# Patient Record
Sex: FEMALE | Race: WHITE | Employment: UNEMPLOYED | URBAN - NONMETROPOLITAN AREA
[De-identification: names, ages, dates, MRNs, and addresses within clinical notes are randomized per-mention and may not be internally consistent; named-entity substitution may affect disease eponyms.]

---

## 2017-02-24 DIAGNOSIS — I10 ESSENTIAL HYPERTENSION: ICD-10-CM

## 2017-02-24 DIAGNOSIS — E03.9 ACQUIRED HYPOTHYROIDISM: ICD-10-CM

## 2017-02-24 DIAGNOSIS — E78.2 MIXED HYPERLIPIDEMIA: ICD-10-CM

## 2017-02-24 LAB
ALT SERPL-CCNC: 18 U/L (ref 0–33)
ANION GAP SERPL CALCULATED.3IONS-SCNC: 10 MEQ/L (ref 7–13)
AST SERPL-CCNC: 23 U/L (ref 0–35)
BASOPHILS ABSOLUTE: 0.1 K/UL (ref 0–0.2)
BASOPHILS RELATIVE PERCENT: 1.3 %
BILIRUBIN URINE: NEGATIVE
BLOOD, URINE: NEGATIVE
BUN BLDV-MCNC: 20 MG/DL (ref 8–23)
CALCIUM SERPL-MCNC: 9.1 MG/DL (ref 8.6–10.2)
CHLORIDE BLD-SCNC: 102 MEQ/L (ref 98–107)
CHOLESTEROL, TOTAL: 204 MG/DL (ref 0–199)
CLARITY: ABNORMAL
CO2: 26 MEQ/L (ref 22–29)
COLOR: YELLOW
CREAT SERPL-MCNC: 0.73 MG/DL (ref 0.5–0.9)
EOSINOPHILS ABSOLUTE: 0.1 K/UL (ref 0–0.7)
EOSINOPHILS RELATIVE PERCENT: 2.6 %
GFR AFRICAN AMERICAN: >60
GFR NON-AFRICAN AMERICAN: >60
GLUCOSE BLD-MCNC: 94 MG/DL (ref 74–109)
GLUCOSE URINE: NEGATIVE MG/DL
HCT VFR BLD CALC: 48.8 % (ref 37–47)
HDLC SERPL-MCNC: 57 MG/DL (ref 40–59)
HEMOGLOBIN: 16.5 G/DL (ref 12–16)
KETONES, URINE: NEGATIVE MG/DL
LDL CHOLESTEROL CALCULATED: 110 MG/DL (ref 0–129)
LEUKOCYTE ESTERASE, URINE: NEGATIVE
LYMPHOCYTES ABSOLUTE: 1.7 K/UL (ref 1–4.8)
LYMPHOCYTES RELATIVE PERCENT: 36.2 %
MCH RBC QN AUTO: 30.4 PG (ref 27–31.3)
MCHC RBC AUTO-ENTMCNC: 33.7 % (ref 33–37)
MCV RBC AUTO: 90.1 FL (ref 82–100)
MONOCYTES ABSOLUTE: 0.6 K/UL (ref 0.2–0.8)
MONOCYTES RELATIVE PERCENT: 12.2 %
NEUTROPHILS ABSOLUTE: 2.2 K/UL (ref 1.4–6.5)
NEUTROPHILS RELATIVE PERCENT: 47.7 %
NITRITE, URINE: NEGATIVE
PDW BLD-RTO: 13.6 % (ref 11.5–14.5)
PH UA: 6.5 (ref 5–9)
PLATELET # BLD: 216 K/UL (ref 130–400)
POTASSIUM SERPL-SCNC: 4.4 MEQ/L (ref 3.5–5.1)
PROTEIN UA: NEGATIVE MG/DL
RBC # BLD: 5.42 M/UL (ref 4.2–5.4)
SODIUM BLD-SCNC: 138 MEQ/L (ref 132–144)
SPECIFIC GRAVITY UA: 1.02 (ref 1–1.03)
TRIGL SERPL-MCNC: 185 MG/DL (ref 0–200)
TSH SERPL DL<=0.05 MIU/L-ACNC: 2.77 UIU/ML (ref 0.27–4.2)
UROBILINOGEN, URINE: 0.2 E.U./DL
WBC # BLD: 4.7 K/UL (ref 4.8–10.8)

## 2017-03-23 ENCOUNTER — OFFICE VISIT (OUTPATIENT)
Dept: FAMILY MEDICINE CLINIC | Age: 77
End: 2017-03-23

## 2017-03-23 VITALS
BODY MASS INDEX: 35.26 KG/M2 | WEIGHT: 199 LBS | DIASTOLIC BLOOD PRESSURE: 72 MMHG | HEART RATE: 69 BPM | SYSTOLIC BLOOD PRESSURE: 132 MMHG | HEIGHT: 63 IN | OXYGEN SATURATION: 98 %

## 2017-03-23 DIAGNOSIS — I10 ESSENTIAL HYPERTENSION: Primary | ICD-10-CM

## 2017-03-23 DIAGNOSIS — E03.9 ACQUIRED HYPOTHYROIDISM: ICD-10-CM

## 2017-03-23 DIAGNOSIS — M17.0 PRIMARY OSTEOARTHRITIS OF BOTH KNEES: ICD-10-CM

## 2017-03-23 DIAGNOSIS — E78.2 MIXED HYPERLIPIDEMIA: ICD-10-CM

## 2017-03-23 DIAGNOSIS — M47.812 OSTEOARTHRITIS OF CERVICAL SPINE, UNSPECIFIED SPINAL OSTEOARTHRITIS COMPLICATION STATUS: ICD-10-CM

## 2017-03-23 PROCEDURE — 99214 OFFICE O/P EST MOD 30 MIN: CPT | Performed by: FAMILY MEDICINE

## 2017-03-23 PROCEDURE — G8417 CALC BMI ABV UP PARAM F/U: HCPCS | Performed by: FAMILY MEDICINE

## 2017-03-23 PROCEDURE — G8427 DOCREV CUR MEDS BY ELIG CLIN: HCPCS | Performed by: FAMILY MEDICINE

## 2017-03-23 PROCEDURE — G8399 PT W/DXA RESULTS DOCUMENT: HCPCS | Performed by: FAMILY MEDICINE

## 2017-03-23 PROCEDURE — 4040F PNEUMOC VAC/ADMIN/RCVD: CPT | Performed by: FAMILY MEDICINE

## 2017-03-23 PROCEDURE — 1036F TOBACCO NON-USER: CPT | Performed by: FAMILY MEDICINE

## 2017-03-23 PROCEDURE — 1123F ACP DISCUSS/DSCN MKR DOCD: CPT | Performed by: FAMILY MEDICINE

## 2017-03-23 PROCEDURE — G8484 FLU IMMUNIZE NO ADMIN: HCPCS | Performed by: FAMILY MEDICINE

## 2017-03-23 PROCEDURE — 1090F PRES/ABSN URINE INCON ASSESS: CPT | Performed by: FAMILY MEDICINE

## 2017-03-23 ASSESSMENT — ENCOUNTER SYMPTOMS
BLURRED VISION: 0
SHORTNESS OF BREATH: 0

## 2017-10-13 ENCOUNTER — APPOINTMENT (OUTPATIENT)
Dept: GENERAL RADIOLOGY | Age: 77
End: 2017-10-13
Payer: MEDICARE

## 2017-10-13 ENCOUNTER — HOSPITAL ENCOUNTER (OUTPATIENT)
Age: 77
Setting detail: OBSERVATION
Discharge: HOME | End: 2017-10-14
Attending: EMERGENCY MEDICINE | Admitting: INTERNAL MEDICINE
Payer: MEDICARE

## 2017-10-13 ENCOUNTER — TELEPHONE (OUTPATIENT)
Dept: ADMINISTRATIVE | Age: 77
End: 2017-10-13

## 2017-10-13 DIAGNOSIS — R07.89 ATYPICAL CHEST PAIN: Primary | ICD-10-CM

## 2017-10-13 DIAGNOSIS — M47.812 OSTEOARTHRITIS OF CERVICAL SPINE, UNSPECIFIED SPINAL OSTEOARTHRITIS COMPLICATION STATUS: ICD-10-CM

## 2017-10-13 DIAGNOSIS — E86.0 DEHYDRATION: ICD-10-CM

## 2017-10-13 LAB
ALBUMIN SERPL-MCNC: 4 G/DL (ref 3.9–4.9)
ALP BLD-CCNC: 77 U/L (ref 40–130)
ALT SERPL-CCNC: 12 U/L (ref 0–33)
ANION GAP SERPL CALCULATED.3IONS-SCNC: 13 MEQ/L (ref 7–13)
AST SERPL-CCNC: 20 U/L (ref 0–35)
BACTERIA: NORMAL /HPF
BASOPHILS ABSOLUTE: 0.1 K/UL (ref 0–0.2)
BASOPHILS RELATIVE PERCENT: 1 %
BILIRUB SERPL-MCNC: 0.6 MG/DL (ref 0–1.2)
BILIRUBIN URINE: NEGATIVE
BLOOD, URINE: NEGATIVE
BUN BLDV-MCNC: 24 MG/DL (ref 8–23)
CALCIUM SERPL-MCNC: 9.5 MG/DL (ref 8.6–10.2)
CHLORIDE BLD-SCNC: 104 MEQ/L (ref 98–107)
CLARITY: CLEAR
CO2: 24 MEQ/L (ref 22–29)
COLOR: YELLOW
CREAT SERPL-MCNC: 0.47 MG/DL (ref 0.5–0.9)
EOSINOPHILS ABSOLUTE: 0.1 K/UL (ref 0–0.7)
EOSINOPHILS RELATIVE PERCENT: 1.8 %
GFR AFRICAN AMERICAN: >60
GFR NON-AFRICAN AMERICAN: >60
GLOBULIN: 3 G/DL (ref 2.3–3.5)
GLUCOSE BLD-MCNC: 110 MG/DL (ref 74–109)
GLUCOSE URINE: NEGATIVE MG/DL
HCT VFR BLD CALC: 48.6 % (ref 37–47)
HEMOGLOBIN: 16.2 G/DL (ref 12–16)
KETONES, URINE: NEGATIVE MG/DL
LEUKOCYTE ESTERASE, URINE: ABNORMAL
LYMPHOCYTES ABSOLUTE: 1.4 K/UL (ref 1–4.8)
LYMPHOCYTES RELATIVE PERCENT: 25.1 %
MAGNESIUM: 2 MG/DL (ref 1.7–2.3)
MCH RBC QN AUTO: 29.5 PG (ref 27–31.3)
MCHC RBC AUTO-ENTMCNC: 33.3 % (ref 33–37)
MCV RBC AUTO: 88.5 FL (ref 82–100)
MONOCYTES ABSOLUTE: 0.7 K/UL (ref 0.2–0.8)
MONOCYTES RELATIVE PERCENT: 12 %
NEUTROPHILS ABSOLUTE: 3.5 K/UL (ref 1.4–6.5)
NEUTROPHILS RELATIVE PERCENT: 60.1 %
NITRITE, URINE: NEGATIVE
PDW BLD-RTO: 13.6 % (ref 11.5–14.5)
PH UA: 5 (ref 5–9)
PLATELET # BLD: 235 K/UL (ref 130–400)
POTASSIUM SERPL-SCNC: 4.1 MEQ/L (ref 3.5–5.1)
PRO-BNP: 153 PG/ML
PROTEIN UA: NEGATIVE MG/DL
RBC # BLD: 5.49 M/UL (ref 4.2–5.4)
RBC UA: NORMAL /HPF (ref 0–2)
SODIUM BLD-SCNC: 141 MEQ/L (ref 132–144)
SPECIFIC GRAVITY UA: 1.03 (ref 1–1.03)
TOTAL CK: 87 U/L (ref 0–170)
TOTAL PROTEIN: 7 G/DL (ref 6.4–8.1)
TROPONIN: <0.01 NG/ML (ref 0–0.01)
URINE REFLEX TO CULTURE: YES
UROBILINOGEN, URINE: 0.2 E.U./DL
WBC # BLD: 5.8 K/UL (ref 4.8–10.8)
WBC UA: NORMAL /HPF (ref 0–5)

## 2017-10-13 PROCEDURE — 96361 HYDRATE IV INFUSION ADD-ON: CPT

## 2017-10-13 PROCEDURE — 84484 ASSAY OF TROPONIN QUANT: CPT

## 2017-10-13 PROCEDURE — 96360 HYDRATION IV INFUSION INIT: CPT

## 2017-10-13 PROCEDURE — 71010 XR CHEST PORTABLE: CPT

## 2017-10-13 PROCEDURE — 2580000003 HC RX 258: Performed by: INTERNAL MEDICINE

## 2017-10-13 PROCEDURE — 36415 COLL VENOUS BLD VENIPUNCTURE: CPT

## 2017-10-13 PROCEDURE — 2580000003 HC RX 258: Performed by: PHYSICIAN ASSISTANT

## 2017-10-13 PROCEDURE — G0378 HOSPITAL OBSERVATION PER HR: HCPCS

## 2017-10-13 PROCEDURE — 80053 COMPREHEN METABOLIC PANEL: CPT

## 2017-10-13 PROCEDURE — 81001 URINALYSIS AUTO W/SCOPE: CPT

## 2017-10-13 PROCEDURE — 87086 URINE CULTURE/COLONY COUNT: CPT

## 2017-10-13 PROCEDURE — 83880 ASSAY OF NATRIURETIC PEPTIDE: CPT

## 2017-10-13 PROCEDURE — 93005 ELECTROCARDIOGRAM TRACING: CPT

## 2017-10-13 PROCEDURE — 85025 COMPLETE CBC W/AUTO DIFF WBC: CPT

## 2017-10-13 PROCEDURE — 99285 EMERGENCY DEPT VISIT HI MDM: CPT

## 2017-10-13 PROCEDURE — 82550 ASSAY OF CK (CPK): CPT

## 2017-10-13 PROCEDURE — 6370000000 HC RX 637 (ALT 250 FOR IP): Performed by: PHYSICIAN ASSISTANT

## 2017-10-13 PROCEDURE — 83735 ASSAY OF MAGNESIUM: CPT

## 2017-10-13 PROCEDURE — 96372 THER/PROPH/DIAG INJ SC/IM: CPT

## 2017-10-13 PROCEDURE — 6360000002 HC RX W HCPCS

## 2017-10-13 PROCEDURE — 6370000000 HC RX 637 (ALT 250 FOR IP): Performed by: INTERNAL MEDICINE

## 2017-10-13 RX ORDER — ATORVASTATIN CALCIUM 20 MG/1
20 TABLET, FILM COATED ORAL NIGHTLY
Status: DISCONTINUED | OUTPATIENT
Start: 2017-10-13 | End: 2017-10-14 | Stop reason: HOSPADM

## 2017-10-13 RX ORDER — MORPHINE SULFATE 4 MG/ML
4 INJECTION, SOLUTION INTRAMUSCULAR; INTRAVENOUS
Status: DISCONTINUED | OUTPATIENT
Start: 2017-10-13 | End: 2017-10-14 | Stop reason: HOSPADM

## 2017-10-13 RX ORDER — ACETAMINOPHEN 325 MG/1
650 TABLET ORAL EVERY 4 HOURS PRN
Status: DISCONTINUED | OUTPATIENT
Start: 2017-10-13 | End: 2017-10-14 | Stop reason: HOSPADM

## 2017-10-13 RX ORDER — MORPHINE SULFATE 2 MG/ML
2 INJECTION, SOLUTION INTRAMUSCULAR; INTRAVENOUS
Status: DISCONTINUED | OUTPATIENT
Start: 2017-10-13 | End: 2017-10-14 | Stop reason: HOSPADM

## 2017-10-13 RX ORDER — NITROGLYCERIN 0.4 MG/1
0.4 TABLET SUBLINGUAL EVERY 5 MIN PRN
Status: DISCONTINUED | OUTPATIENT
Start: 2017-10-13 | End: 2017-10-14 | Stop reason: HOSPADM

## 2017-10-13 RX ORDER — PHENOL 1.4 %
1 AEROSOL, SPRAY (ML) MUCOUS MEMBRANE DAILY
COMMUNITY

## 2017-10-13 RX ORDER — ONDANSETRON 2 MG/ML
4 INJECTION INTRAMUSCULAR; INTRAVENOUS EVERY 6 HOURS PRN
Status: DISCONTINUED | OUTPATIENT
Start: 2017-10-13 | End: 2017-10-14 | Stop reason: HOSPADM

## 2017-10-13 RX ORDER — 0.9 % SODIUM CHLORIDE 0.9 %
500 INTRAVENOUS SOLUTION INTRAVENOUS ONCE
Status: COMPLETED | OUTPATIENT
Start: 2017-10-13 | End: 2017-10-13

## 2017-10-13 RX ORDER — SODIUM CHLORIDE 0.9 % (FLUSH) 0.9 %
10 SYRINGE (ML) INJECTION PRN
Status: DISCONTINUED | OUTPATIENT
Start: 2017-10-13 | End: 2017-10-14 | Stop reason: HOSPADM

## 2017-10-13 RX ORDER — SODIUM CHLORIDE 0.9 % (FLUSH) 0.9 %
10 SYRINGE (ML) INJECTION EVERY 12 HOURS SCHEDULED
Status: DISCONTINUED | OUTPATIENT
Start: 2017-10-13 | End: 2017-10-14 | Stop reason: HOSPADM

## 2017-10-13 RX ADMIN — SODIUM CHLORIDE 500 ML: 9 INJECTION, SOLUTION INTRAVENOUS at 16:15

## 2017-10-13 RX ADMIN — NITROGLYCERIN 0.5 INCH: 20 OINTMENT TOPICAL at 17:08

## 2017-10-13 RX ADMIN — Medication 10 ML: at 22:03

## 2017-10-13 RX ADMIN — ATORVASTATIN CALCIUM 20 MG: 20 TABLET, FILM COATED ORAL at 22:01

## 2017-10-13 RX ADMIN — ENOXAPARIN SODIUM 40 MG: 40 INJECTION SUBCUTANEOUS at 22:01

## 2017-10-13 ASSESSMENT — ENCOUNTER SYMPTOMS
VOMITING: 0
ALLERGIC/IMMUNOLOGIC NEGATIVE: 1
APNEA: 0
COLOR CHANGE: 0
EYE PAIN: 0
TROUBLE SWALLOWING: 0
CHEST TIGHTNESS: 1
WHEEZING: 0
COUGH: 0
CHOKING: 0
DIARRHEA: 0
ABDOMINAL PAIN: 0
SHORTNESS OF BREATH: 1

## 2017-10-13 NOTE — ED PROVIDER NOTES
[address] [facility] ED  eMERGENCY dEPARTMENT eNCOUnter      Pt Name: Leeann Humphrey  MRN: [id number]  [place] 1940  Date of evaluation: 10/13/2017  Provider: Chastity Solares PA-C    CHIEF COMPLAINT       Chief Complaint   Patient presents with    Chest Pain     SOB x 1 week, tightness across shoulders and in chest.         HISTORY OF PRESENT ILLNESS   (Location/Symptom, Timing/Onset, Context/Setting, Quality, Duration, Modifying Factors, Severity)  Note limiting factors. Leeann Humphrey is a 68 y.o. female who presents to the emergency department with a 1-2 week history of intermittent episodes of shortness of breath and chest tightness. Patient denies any chest pain, stating it is just a \"weird feeling\" in the diffuse upper chest region with radiation into the anterior neck. Patient denies any recent cough or chest congestion. No headaches or dizziness, but SOB does make her feel lightheaded at times. No abdominal pain, nausea, vomiting or diarrhea. No fevers. No leg swelling or edema. Shortness of breath is intermittent but does seem to be more prominent with exertion and better at rest.     HPI    Nursing Notes were reviewed. REVIEW OF SYSTEMS    (2-9 systems for level 4, 10 or more for level 5)     Review of Systems   Constitutional: Negative for diaphoresis and fever. HENT: Negative for hearing loss and trouble swallowing. Eyes: Negative for pain. Respiratory: Positive for chest tightness and shortness of breath. Negative for apnea, cough, choking and wheezing. Cardiovascular: Negative for chest pain. Gastrointestinal: Negative for abdominal pain, diarrhea and vomiting. Endocrine: Negative. Genitourinary: Negative for hematuria. Musculoskeletal: Negative for neck pain and neck stiffness. Skin: Negative for color change. Allergic/Immunologic: Negative. Neurological: Positive for light-headedness. Negative for dizziness and numbness. Hematological: Negative. Psychiatric/Behavioral: Negative. All other systems reviewed and are negative. Except as noted above the remainder of the review of systems was reviewed and negative. PAST MEDICAL HISTORY     Past Medical History:   Diagnosis Date    Bladder prolapse, female, acquired     Degenerative arthritis of cervical spine 6/29/2012    Diverticulosis     History of bone density study 09/2014    Hyperlipidemia 6/29/2012    Hypertension 6/29/2012    Hypothyroidism 6/29/2012    Internal hemorrhoid     Osteoarthritis of knees, bilateral     Osteopenia 09/2014         SURGICAL HISTORY       Past Surgical History:   Procedure Laterality Date    ARTHROSCOPY / ARTHROTOMY KNEE Right 10/14/2016    RIGHT KNEE ARTHROSCOPY / LOOSE BODY / PAT ON ADMIT performed by Yakov Bermudez MD at [facility]  11/2015    CARPAL TUNNEL RELEASE Right     CATARACT REMOVAL WITH IMPLANT Bilateral     COLONOSCOPY  12/15/05    CYSTOCELE REPAIR      HYSTERECTOMY      RECTOCELE REPAIR           CURRENT MEDICATIONS       Previous Medications    CALCIUM CARBONATE 600 MG TABS TABLET    Take 1 tablet by mouth daily    CELECOXIB (CELEBREX) 200 MG CAPSULE    Take 1 capsule by mouth  daily    LEVOTHYROXINE (SYNTHROID) 50 MCG TABLET    TAKE 1 TABLET BY MOUTH ON  MONDAY, WEDNESDAY AND  FRIDAY ALTERNATING WITH THE 75MCG    LEVOTHYROXINE (SYNTHROID) 75 MCG TABLET    TAKE 1 TABLET BY MOUTH ON  SUNDAY, TUESDAY, THURSDAY  AND SATURDAY    METOPROLOL SUCCINATE (TOPROL XL) 50 MG EXTENDED RELEASE TABLET    Take 1 tablet by mouth  daily    PRAVASTATIN (PRAVACHOL) 40 MG TABLET    Take 1 tablet by mouth  daily    VITAMIN D (CHOLECALCIFEROL) 1000 UNIT TABS TABLET    Take 1,000 Units by mouth daily       ALLERGIES     Review of patient's allergies indicates no known allergies. FAMILY HISTORY     History reviewed. No pertinent family history.        SOCIAL HISTORY       Social History     Social History    Marital status: department. MDM      REASSESSMENT     ED Course          CONSULTS:  None    PROCEDURES:  Unless otherwise noted below, none     Procedures    FINAL IMPRESSION      1. Atypical chest pain    2. Dehydration          DISPOSITION/PLAN   DISPOSITION Decision to Admit    PATIENT REFERRED TO:  No follow-up provider specified.     DISCHARGE MEDICATIONS:  New Prescriptions    No medications on file          (Please note that portions of this note were completed with a voice recognition program.  Efforts were made to edit the dictations but occasionally words are mis-transcribed.)    Chastity Solares PA-C (electronically signed)  Attending Emergency Physician         Chastity Solares PA-C  10/13/17 1043

## 2017-10-13 NOTE — ED TRIAGE NOTES
Pt came to er reports just not feeling well sob more with exertion pt denies any sx at this time just doesn't feel right.  Pt alert oreinted x 3 skin warm dry pink lungs deminished no swelling noted to lower legs and /feet pedal pulses palp

## 2017-10-14 VITALS
BODY MASS INDEX: 34.77 KG/M2 | TEMPERATURE: 97.6 F | DIASTOLIC BLOOD PRESSURE: 53 MMHG | RESPIRATION RATE: 18 BRPM | HEIGHT: 63 IN | SYSTOLIC BLOOD PRESSURE: 134 MMHG | WEIGHT: 196.21 LBS | HEART RATE: 63 BPM | OXYGEN SATURATION: 96 %

## 2017-10-14 PROBLEM — R06.09 DOE (DYSPNEA ON EXERTION): Status: ACTIVE | Noted: 2017-10-14

## 2017-10-14 PROBLEM — R07.9 CHEST PAIN: Status: ACTIVE | Noted: 2017-10-14

## 2017-10-14 LAB
ANION GAP SERPL CALCULATED.3IONS-SCNC: 13 MEQ/L (ref 7–13)
BUN BLDV-MCNC: 18 MG/DL (ref 8–23)
CALCIUM SERPL-MCNC: 8.9 MG/DL (ref 8.6–10.2)
CHLORIDE BLD-SCNC: 105 MEQ/L (ref 98–107)
CHOLESTEROL, TOTAL: 171 MG/DL (ref 0–199)
CO2: 25 MEQ/L (ref 22–29)
CREAT SERPL-MCNC: 0.61 MG/DL (ref 0.5–0.9)
EKG ATRIAL RATE: 64 BPM
EKG ATRIAL RATE: 72 BPM
EKG P AXIS: 26 DEGREES
EKG P AXIS: 60 DEGREES
EKG P-R INTERVAL: 178 MS
EKG P-R INTERVAL: 186 MS
EKG Q-T INTERVAL: 390 MS
EKG Q-T INTERVAL: 428 MS
EKG QRS DURATION: 82 MS
EKG QRS DURATION: 90 MS
EKG QTC CALCULATION (BAZETT): 427 MS
EKG QTC CALCULATION (BAZETT): 441 MS
EKG R AXIS: -27 DEGREES
EKG R AXIS: -31 DEGREES
EKG T AXIS: 13 DEGREES
EKG T AXIS: 40 DEGREES
EKG VENTRICULAR RATE: 64 BPM
EKG VENTRICULAR RATE: 72 BPM
GFR AFRICAN AMERICAN: >60
GFR NON-AFRICAN AMERICAN: >60
GLUCOSE BLD-MCNC: 89 MG/DL (ref 74–109)
HCT VFR BLD CALC: 44.8 % (ref 37–47)
HDLC SERPL-MCNC: 50 MG/DL (ref 40–59)
HEMOGLOBIN: 15.1 G/DL (ref 12–16)
INR BLD: 1
LDL CHOLESTEROL CALCULATED: 89 MG/DL (ref 0–129)
MAGNESIUM: 2 MG/DL (ref 1.7–2.3)
MCH RBC QN AUTO: 30.2 PG (ref 27–31.3)
MCHC RBC AUTO-ENTMCNC: 33.7 % (ref 33–37)
MCV RBC AUTO: 89.8 FL (ref 82–100)
PDW BLD-RTO: 13.7 % (ref 11.5–14.5)
PLATELET # BLD: 204 K/UL (ref 130–400)
POTASSIUM SERPL-SCNC: 4.4 MEQ/L (ref 3.5–5.1)
PRO-BNP: 146 PG/ML
PROTHROMBIN TIME: 10.8 SEC (ref 8.1–13.7)
RBC # BLD: 4.98 M/UL (ref 4.2–5.4)
SODIUM BLD-SCNC: 143 MEQ/L (ref 132–144)
TRIGL SERPL-MCNC: 162 MG/DL (ref 0–200)
TROPONIN: <0.01 NG/ML (ref 0–0.01)
WBC # BLD: 5.3 K/UL (ref 4.8–10.8)

## 2017-10-14 PROCEDURE — 6370000000 HC RX 637 (ALT 250 FOR IP): Performed by: INTERNAL MEDICINE

## 2017-10-14 PROCEDURE — 99235 HOSP IP/OBS SAME DATE MOD 70: CPT | Performed by: INTERNAL MEDICINE

## 2017-10-14 PROCEDURE — G0378 HOSPITAL OBSERVATION PER HR: HCPCS

## 2017-10-14 PROCEDURE — 36415 COLL VENOUS BLD VENIPUNCTURE: CPT

## 2017-10-14 PROCEDURE — 83735 ASSAY OF MAGNESIUM: CPT

## 2017-10-14 PROCEDURE — 80061 LIPID PANEL: CPT

## 2017-10-14 PROCEDURE — 93005 ELECTROCARDIOGRAM TRACING: CPT

## 2017-10-14 PROCEDURE — 80048 BASIC METABOLIC PNL TOTAL CA: CPT

## 2017-10-14 PROCEDURE — 2580000003 HC RX 258: Performed by: INTERNAL MEDICINE

## 2017-10-14 PROCEDURE — 85027 COMPLETE CBC AUTOMATED: CPT

## 2017-10-14 PROCEDURE — 2700000000 HC OXYGEN THERAPY PER DAY

## 2017-10-14 PROCEDURE — 83880 ASSAY OF NATRIURETIC PEPTIDE: CPT

## 2017-10-14 PROCEDURE — 85610 PROTHROMBIN TIME: CPT

## 2017-10-14 PROCEDURE — 84484 ASSAY OF TROPONIN QUANT: CPT

## 2017-10-14 RX ORDER — METOPROLOL SUCCINATE 50 MG/1
50 TABLET, EXTENDED RELEASE ORAL DAILY
Status: DISCONTINUED | OUTPATIENT
Start: 2017-10-14 | End: 2017-10-14 | Stop reason: HOSPADM

## 2017-10-14 RX ORDER — LEVOTHYROXINE SODIUM 0.07 MG/1
75 TABLET ORAL DAILY
Status: DISCONTINUED | OUTPATIENT
Start: 2017-10-14 | End: 2017-10-14 | Stop reason: HOSPADM

## 2017-10-14 RX ORDER — CALCIUM CARBONATE 500(1250)
500 TABLET ORAL DAILY
Status: DISCONTINUED | OUTPATIENT
Start: 2017-10-14 | End: 2017-10-14 | Stop reason: HOSPADM

## 2017-10-14 RX ORDER — PRAVASTATIN SODIUM 40 MG
40 TABLET ORAL NIGHTLY
Status: DISCONTINUED | OUTPATIENT
Start: 2017-10-14 | End: 2017-10-14 | Stop reason: HOSPADM

## 2017-10-14 RX ORDER — CELECOXIB 200 MG/1
200 CAPSULE ORAL DAILY
Status: DISCONTINUED | OUTPATIENT
Start: 2017-10-14 | End: 2017-10-14 | Stop reason: HOSPADM

## 2017-10-14 RX ORDER — LEVOTHYROXINE SODIUM 0.05 MG/1
50 TABLET ORAL DAILY
Status: DISCONTINUED | OUTPATIENT
Start: 2017-10-14 | End: 2017-10-14 | Stop reason: HOSPADM

## 2017-10-14 RX ADMIN — VITAMIN D, TAB 1000IU (100/BT) 1000 UNITS: 25 TAB at 12:14

## 2017-10-14 RX ADMIN — CELECOXIB 200 MG: 200 CAPSULE ORAL at 12:13

## 2017-10-14 RX ADMIN — Medication 10 ML: at 12:16

## 2017-10-14 RX ADMIN — METOPROLOL SUCCINATE 50 MG: 50 TABLET, FILM COATED, EXTENDED RELEASE ORAL at 12:13

## 2017-10-14 RX ADMIN — CALCIUM 500 MG: 500 TABLET ORAL at 12:13

## 2017-10-14 RX ADMIN — ACETAMINOPHEN 650 MG: 325 TABLET ORAL at 03:16

## 2017-10-14 ASSESSMENT — PAIN SCALES - GENERAL
PAINLEVEL_OUTOF10: 4
PAINLEVEL_OUTOF10: 5
PAINLEVEL_OUTOF10: 4
PAINLEVEL_OUTOF10: 0

## 2017-10-14 ASSESSMENT — ENCOUNTER SYMPTOMS
BLOOD IN STOOL: 0
COUGH: 0
GASTROINTESTINAL NEGATIVE: 1
EYES NEGATIVE: 1
SHORTNESS OF BREATH: 0
WHEEZING: 0
CHEST TIGHTNESS: 1
STRIDOR: 0
NAUSEA: 0

## 2017-10-14 NOTE — H&P
History and Physical  Patient: Matthew Lora  Unit/Bed: F724/X730-27  YOB: 1940  MRN: [id number]  Acct: [de-identified]   Admitting Diagnosis: Chest pain [R07.9]  Admit Date:  10/13/2017  Hospital Day: 0      Chief Complaint:     1 week Chest pressure and shortness of breath. CP is tigh. No radiation. Last up to couple hours. Occurs with or without exertioln mostly occurs with stressw from 's dementia. Trops negative    EKG:NSR  PMHx:  Past Medical History:   Diagnosis Date    Bladder prolapse, female, acquired     Degenerative arthritis of cervical spine 6/29/2012    Diverticulosis     History of bone density study 09/2014    Hyperlipidemia 6/29/2012    Hypertension 6/29/2012    Hypothyroidism 6/29/2012    Internal hemorrhoid     Osteoarthritis of knees, bilateral     Osteopenia 09/2014       PSHx:  Past Surgical History:   Procedure Laterality Date    ARTHROSCOPY / ARTHROTOMY KNEE Right 10/14/2016    RIGHT KNEE ARTHROSCOPY / LOOSE BODY / PAT ON ADMIT performed by Rakan Sanabria MD at [facility]  11/2015    CARPAL TUNNEL RELEASE Right     CATARACT REMOVAL WITH IMPLANT Bilateral     COLONOSCOPY  12/15/05    CYSTOCELE REPAIR      HYSTERECTOMY      RECTOCELE REPAIR         Social Hx:  Social History     Social History    Marital status:      Spouse name: N/A    Number of children: 2    Years of education: N/A     Social History Main Topics    Smoking status: Former Smoker     Packs/day: 0.50     Types: Cigarettes     Quit date: 8/30/1972    Smokeless tobacco: Never Used    Alcohol use No    Drug use: No    Sexual activity: Not Asked     Other Topics Concern    None     Social History Narrative    Has one son and one daughter.  is a few years older and now has dementia and is on aricept. Family Hx:  History reviewed. No pertinent family history. Review of Systems:   Review of Systems   Constitutional: Negative.   Negative for

## 2017-10-14 NOTE — FLOWSHEET NOTE
Assessment completed. Admission completed. Awake and resting quietly. resp even and nonlabored. Denies any pain or discomfort. No complaints at this time. Call light in reach.  Electronically signed by Loan Peñaloza RN on 10/13/2017 at 10:20 PM

## 2017-10-14 NOTE — FLOWSHEET NOTE
Lab here to redraw labs. rn called lab looking for previous lab results.  Electronically signed by Teo Moy RN on 10/14/2017 at 3:03 AM

## 2017-10-15 LAB — URINE CULTURE, ROUTINE: NORMAL

## 2017-10-19 ENCOUNTER — OFFICE VISIT (OUTPATIENT)
Dept: CARDIOLOGY | Age: 77
End: 2017-10-19

## 2017-10-19 VITALS
RESPIRATION RATE: 16 BRPM | DIASTOLIC BLOOD PRESSURE: 72 MMHG | BODY MASS INDEX: 35.44 KG/M2 | OXYGEN SATURATION: 95 % | WEIGHT: 200 LBS | HEIGHT: 63 IN | TEMPERATURE: 98.5 F | SYSTOLIC BLOOD PRESSURE: 134 MMHG | HEART RATE: 72 BPM

## 2017-10-19 DIAGNOSIS — R06.09 DOE (DYSPNEA ON EXERTION): ICD-10-CM

## 2017-10-19 DIAGNOSIS — I10 HYPERTENSION, UNSPECIFIED TYPE: ICD-10-CM

## 2017-10-19 DIAGNOSIS — R07.9 CHEST PAIN, UNSPECIFIED TYPE: Primary | ICD-10-CM

## 2017-10-19 PROCEDURE — G8484 FLU IMMUNIZE NO ADMIN: HCPCS | Performed by: INTERNAL MEDICINE

## 2017-10-19 PROCEDURE — G8427 DOCREV CUR MEDS BY ELIG CLIN: HCPCS | Performed by: INTERNAL MEDICINE

## 2017-10-19 PROCEDURE — 1036F TOBACCO NON-USER: CPT | Performed by: INTERNAL MEDICINE

## 2017-10-19 PROCEDURE — 1123F ACP DISCUSS/DSCN MKR DOCD: CPT | Performed by: INTERNAL MEDICINE

## 2017-10-19 PROCEDURE — G8417 CALC BMI ABV UP PARAM F/U: HCPCS | Performed by: INTERNAL MEDICINE

## 2017-10-19 PROCEDURE — 1090F PRES/ABSN URINE INCON ASSESS: CPT | Performed by: INTERNAL MEDICINE

## 2017-10-19 PROCEDURE — G8399 PT W/DXA RESULTS DOCUMENT: HCPCS | Performed by: INTERNAL MEDICINE

## 2017-10-19 PROCEDURE — 4040F PNEUMOC VAC/ADMIN/RCVD: CPT | Performed by: INTERNAL MEDICINE

## 2017-10-19 PROCEDURE — 99214 OFFICE O/P EST MOD 30 MIN: CPT | Performed by: INTERNAL MEDICINE

## 2017-10-19 NOTE — PROGRESS NOTES
4.2 07/02/2012     CMP:    Lab Results   Component Value Date     10/14/2017    K 4.4 10/14/2017     10/14/2017    CO2 25 10/14/2017    BUN 18 10/14/2017    CREATININE 0.61 10/14/2017    GFRAA >60.0 10/14/2017    LABGLOM >60.0 10/14/2017    GLUCOSE 89 10/14/2017    PROT 7.0 10/13/2017    LABALBU 4.0 10/13/2017    CALCIUM 8.9 10/14/2017    BILITOT 0.6 10/13/2017    ALKPHOS 77 10/13/2017    AST 20 10/13/2017    ALT 12 10/13/2017     BMP:    Lab Results   Component Value Date     10/14/2017    K 4.4 10/14/2017     10/14/2017    CO2 25 10/14/2017    BUN 18 10/14/2017    LABALBU 4.0 10/13/2017    CREATININE 0.61 10/14/2017    CALCIUM 8.9 10/14/2017    GFRAA >60.0 10/14/2017    LABGLOM >60.0 10/14/2017    GLUCOSE 89 10/14/2017     Magnesium:    Lab Results   Component Value Date    MG 2.0 10/14/2017     TSH:  Lab Results   Component Value Date    TSH 2.770 02/24/2017       Patient Active Problem List   Diagnosis    Hypertension    Hyperlipidemia    Hypothyroidism    Degenerative arthritis of cervical spine    Bladder prolapse, female, acquired    Osteoarthritis of knees, bilateral    Chest pain    ALEJANDRE (dyspnea on exertion)       Assessment:    1. Chest pain, unspecified type  NM Myocardial Spect Rest Exercise or Rx    ECHO Complete 2D W Doppler W Color   2. Hypertension, unspecified type     3. ALEJANDRE (dyspnea on exertion)  NM Myocardial Spect Rest Exercise or Rx    ECHO Complete 2D W Doppler W Color       1. Plan:  There are no discontinued medications.   Modified Medications    No medications on file     Orders Placed This Encounter   Procedures    NM Myocardial Spect Rest Exercise or Rx     Standing Status:   Future     Standing Expiration Date:   10/19/2018     Scheduling Instructions:      loki     Order Specific Question:   Reason for Exam?     Answer:   Angina    ECHO Complete 2D W Doppler W Color     Standing Status:   Future     Standing Expiration Date:   10/19/2018     Order Specific Question:   Reason for exam:     Answer:   cp     Orders Placed This Encounter   Medications    aspirin 81 MG tablet     Sig: Take 1 tablet by mouth daily With Food     Dispense:  30 tablet     Refill:  3       1. Return for AFTER TESTS, Heart Healthy Lifestyle, Improve BMI.         Electronically signed by Trudy Frederick MD on 10/19/2017 at 2:23 PM

## 2017-10-26 ENCOUNTER — HOSPITAL ENCOUNTER (OUTPATIENT)
Dept: NUCLEAR MEDICINE | Age: 77
Discharge: HOME | End: 2017-10-26
Payer: MEDICARE

## 2017-10-26 ENCOUNTER — HOSPITAL ENCOUNTER (OUTPATIENT)
Dept: NON INVASIVE DIAGNOSTICS | Age: 77
Discharge: HOME | End: 2017-10-26
Payer: MEDICARE

## 2017-10-26 DIAGNOSIS — R07.9 CHEST PAIN, UNSPECIFIED TYPE: ICD-10-CM

## 2017-10-26 DIAGNOSIS — R06.09 DOE (DYSPNEA ON EXERTION): ICD-10-CM

## 2017-10-26 LAB
LV EF: 60 %
LVEF MODALITY: NORMAL

## 2017-10-26 PROCEDURE — 93306 TTE W/DOPPLER COMPLETE: CPT

## 2017-10-26 PROCEDURE — 3430000000 HC RX DIAGNOSTIC RADIOPHARMACEUTICAL: Performed by: INTERNAL MEDICINE

## 2017-10-26 PROCEDURE — A9502 TC99M TETROFOSMIN: HCPCS | Performed by: INTERNAL MEDICINE

## 2017-10-26 PROCEDURE — 2580000003 HC RX 258: Performed by: INTERNAL MEDICINE

## 2017-10-26 PROCEDURE — 93017 CV STRESS TEST TRACING ONLY: CPT

## 2017-10-26 PROCEDURE — 78452 HT MUSCLE IMAGE SPECT MULT: CPT

## 2017-10-26 PROCEDURE — 6360000002 HC RX W HCPCS: Performed by: INTERNAL MEDICINE

## 2017-10-26 RX ORDER — SODIUM CHLORIDE 0.9 % (FLUSH) 0.9 %
10 SYRINGE (ML) INJECTION PRN
Status: DISCONTINUED | OUTPATIENT
Start: 2017-10-26 | End: 2017-10-29 | Stop reason: HOSPADM

## 2017-10-26 RX ADMIN — REGADENOSON 0.4 MG: 0.08 INJECTION, SOLUTION INTRAVENOUS at 09:39

## 2017-10-26 RX ADMIN — Medication 20 ML: at 09:44

## 2017-10-26 RX ADMIN — TETROFOSMIN 11.3 MILLICURIE: 0.23 INJECTION, POWDER, LYOPHILIZED, FOR SOLUTION INTRAVENOUS at 08:15

## 2017-10-26 RX ADMIN — Medication 10 ML: at 08:15

## 2017-10-26 RX ADMIN — TETROFOSMIN 32.7 MILLICURIE: 0.23 INJECTION, POWDER, LYOPHILIZED, FOR SOLUTION INTRAVENOUS at 09:35

## 2017-10-26 NOTE — PROGRESS NOTES
Stress part of test complete. Mild SOB, face flushed, denies chest pain and or cardiac related symptoms. VSS, uneventful recovery. To nuclear in stable condition.

## 2017-11-13 ENCOUNTER — OFFICE VISIT (OUTPATIENT)
Dept: CARDIOLOGY | Age: 77
End: 2017-11-13

## 2017-11-13 VITALS
TEMPERATURE: 98.6 F | WEIGHT: 201 LBS | SYSTOLIC BLOOD PRESSURE: 136 MMHG | OXYGEN SATURATION: 97 % | RESPIRATION RATE: 16 BRPM | HEART RATE: 76 BPM | BODY MASS INDEX: 35.61 KG/M2 | DIASTOLIC BLOOD PRESSURE: 78 MMHG | HEIGHT: 63 IN

## 2017-11-13 DIAGNOSIS — R06.09 DOE (DYSPNEA ON EXERTION): ICD-10-CM

## 2017-11-13 DIAGNOSIS — I10 HYPERTENSION, UNSPECIFIED TYPE: ICD-10-CM

## 2017-11-13 DIAGNOSIS — R00.2 HEART PALPITATIONS: Primary | ICD-10-CM

## 2017-11-13 DIAGNOSIS — R07.9 CHEST PAIN, UNSPECIFIED TYPE: ICD-10-CM

## 2017-11-13 DIAGNOSIS — E78.2 MIXED HYPERLIPIDEMIA: ICD-10-CM

## 2017-11-13 PROCEDURE — 1036F TOBACCO NON-USER: CPT | Performed by: INTERNAL MEDICINE

## 2017-11-13 PROCEDURE — G8427 DOCREV CUR MEDS BY ELIG CLIN: HCPCS | Performed by: INTERNAL MEDICINE

## 2017-11-13 PROCEDURE — G8417 CALC BMI ABV UP PARAM F/U: HCPCS | Performed by: INTERNAL MEDICINE

## 2017-11-13 PROCEDURE — 1123F ACP DISCUSS/DSCN MKR DOCD: CPT | Performed by: INTERNAL MEDICINE

## 2017-11-13 PROCEDURE — 4040F PNEUMOC VAC/ADMIN/RCVD: CPT | Performed by: INTERNAL MEDICINE

## 2017-11-13 PROCEDURE — G8399 PT W/DXA RESULTS DOCUMENT: HCPCS | Performed by: INTERNAL MEDICINE

## 2017-11-13 PROCEDURE — G8484 FLU IMMUNIZE NO ADMIN: HCPCS | Performed by: INTERNAL MEDICINE

## 2017-11-13 PROCEDURE — 1090F PRES/ABSN URINE INCON ASSESS: CPT | Performed by: INTERNAL MEDICINE

## 2017-11-13 PROCEDURE — 99214 OFFICE O/P EST MOD 30 MIN: CPT | Performed by: INTERNAL MEDICINE

## 2017-11-13 ASSESSMENT — ENCOUNTER SYMPTOMS
COUGH: 0
SHORTNESS OF BREATH: 1
CHEST TIGHTNESS: 0
NAUSEA: 0
EYES NEGATIVE: 1
GASTROINTESTINAL NEGATIVE: 1
BLOOD IN STOOL: 0
STRIDOR: 0
WHEEZING: 0

## 2017-11-13 NOTE — PROGRESS NOTES
Subsequent Progress Note  Patient: Chapis Cheung  YOB: 1940  MRN: [id number]    Chief Complaint: ALEJANDER and Palp  Chief Complaint   Patient presents with    Hypertension    Shortness of Breath    Results     Review of Stress and Echo   10/2017 SPECT negative  10/2017 ECHO 60%    Subjective/HPI  Still has alejandre and now describes. Sounds like PVCs  Stress and Echo negative. EKG:  Past Medical History:   Diagnosis Date    Bladder prolapse, female, acquired     Degenerative arthritis of cervical spine 6/29/2012    Diverticulosis     History of bone density study 09/2014    Hyperlipidemia 6/29/2012    Hypertension 6/29/2012    Hypothyroidism 6/29/2012    Internal hemorrhoid     Osteoarthritis of knees, bilateral     Osteopenia 09/2014       Past Surgical History:   Procedure Laterality Date    ARTHROSCOPY / ARTHROTOMY KNEE Right 10/14/2016    RIGHT KNEE ARTHROSCOPY / LOOSE BODY / PAT ON ADMIT performed by Blade Dyson MD at [facility]  11/2015    CARPAL TUNNEL RELEASE Right     CATARACT REMOVAL WITH IMPLANT Bilateral     COLONOSCOPY  12/15/05    CYSTOCELE REPAIR      HYSTERECTOMY      RECTOCELE REPAIR         No family history on file. Social History     Social History    Marital status:      Spouse name: N/A    Number of children: 2    Years of education: N/A     Social History Main Topics    Smoking status: Former Smoker     Packs/day: 0.50     Types: Cigarettes     Quit date: 8/30/1972    Smokeless tobacco: Never Used    Alcohol use No    Drug use: No    Sexual activity: Not Asked     Other Topics Concern    None     Social History Narrative    Has one son and one daughter.  is a few years older and now has dementia and is on aricept.        No Known Allergies    Current Outpatient Prescriptions   Medication Sig Dispense Refill    aspirin 81 MG tablet Take 1 tablet by mouth daily With Food 30 tablet 3    calcium carbonate 600 MG TABS tablet Take 1 tablet by mouth daily      vitamin D (CHOLECALCIFEROL) 1000 UNIT TABS tablet Take 1,000 Units by mouth daily      celecoxib (CELEBREX) 200 MG capsule Take 1 capsule by mouth  daily 90 capsule 3    pravastatin (PRAVACHOL) 40 MG tablet Take 1 tablet by mouth  daily 90 tablet 3    metoprolol succinate (TOPROL XL) 50 MG extended release tablet Take 1 tablet by mouth  daily 90 tablet 3    levothyroxine (SYNTHROID) 75 MCG tablet TAKE 1 TABLET BY MOUTH ON  SUNDAY, TUESDAY, THURSDAY  AND SATURDAY 36 tablet 3    levothyroxine (SYNTHROID) 50 MCG tablet TAKE 1 TABLET BY MOUTH ON  MONDAY, WEDNESDAY AND  FRIDAY ALTERNATING WITH THE 75MCG 27 tablet 5     No current facility-administered medications for this visit. Review of Systems:   Review of Systems   Constitutional: Negative. Negative for diaphoresis and fatigue. HENT: Negative. Eyes: Negative. Respiratory: Positive for shortness of breath. Negative for cough, chest tightness, wheezing and stridor. Cardiovascular: Positive for palpitations. Negative for chest pain and leg swelling. Gastrointestinal: Negative. Negative for blood in stool and nausea. Genitourinary: Negative. Musculoskeletal: Negative. Skin: Negative. Neurological: Negative. Negative for dizziness, syncope, weakness and light-headedness. Hematological: Negative. Psychiatric/Behavioral: Negative. Physical Examination:    /78 (Site: Left Arm, Position: Sitting, Cuff Size: Large Adult)   Pulse 76   Temp 98.6 °F (37 °C) (Temporal)   Resp 16   Ht 5' 3\" (1.6 m)   Wt 201 lb (91.2 kg)   SpO2 97%   BMI 35.61 kg/m²    Physical Exam   Constitutional: She appears healthy. No distress. HENT:   Normal cephalic and Atraumatic   Eyes: Pupils are equal, round, and reactive to light. Neck: Normal range of motion and thyroid normal. Neck supple. No JVD present. No neck adenopathy. No thyromegaly present.    Cardiovascular: Normal rate, regular rhythm, normal heart sounds, intact distal pulses and normal pulses. Pulmonary/Chest: Effort normal and breath sounds normal. She has no wheezes. She has no rales. She exhibits no tenderness. Abdominal: Soft. Bowel sounds are normal. There is no tenderness. Musculoskeletal: Normal range of motion. She exhibits no edema or tenderness. Neurological: She is alert and oriented to person, place, and time. Skin: Skin is warm. No cyanosis. Nails show no clubbing.        LABS:  CBC:   Lab Results   Component Value Date    WBC 5.3 10/14/2017    RBC 4.98 10/14/2017    HGB 15.1 10/14/2017    HCT 44.8 10/14/2017    MCV 89.8 10/14/2017    MCH 30.2 10/14/2017    MCHC 33.7 10/14/2017    RDW 13.7 10/14/2017     10/14/2017     Lipids:  Lab Results   Component Value Date    CHOL 171 10/14/2017    CHOL 204 (H) 02/24/2017    CHOL 198 01/21/2016     Lab Results   Component Value Date    TRIG 162 10/14/2017    TRIG 185 02/24/2017    TRIG 146 01/21/2016     Lab Results   Component Value Date    HDL 50 10/14/2017    HDL 57 02/24/2017    HDL 59 01/21/2016     Lab Results   Component Value Date    LDLCALC 89 10/14/2017    LDLCALC 110 02/24/2017    LDLCALC 110 01/21/2016     No results found for: LABVLDL, VLDL  Lab Results   Component Value Date    CHOLHDLRATIO 2.9 03/14/2013    CHOLHDLRATIO 2.8 10/01/2012    CHOLHDLRATIO 4.2 07/02/2012     CMP:    Lab Results   Component Value Date     10/14/2017    K 4.4 10/14/2017     10/14/2017    CO2 25 10/14/2017    BUN 18 10/14/2017    CREATININE 0.61 10/14/2017    GFRAA >60.0 10/14/2017    LABGLOM >60.0 10/14/2017    GLUCOSE 89 10/14/2017    PROT 7.0 10/13/2017    LABALBU 4.0 10/13/2017    CALCIUM 8.9 10/14/2017    BILITOT 0.6 10/13/2017    ALKPHOS 77 10/13/2017    AST 20 10/13/2017    ALT 12 10/13/2017     BMP:    Lab Results   Component Value Date     10/14/2017    K 4.4 10/14/2017     10/14/2017    CO2 25 10/14/2017    BUN 18 10/14/2017    LABALBU 4.0 10/13/2017    CREATININE 0.61 10/14/2017    CALCIUM 8.9 10/14/2017    GFRAA >60.0 10/14/2017    LABGLOM >60.0 10/14/2017    GLUCOSE 89 10/14/2017     Magnesium:    Lab Results   Component Value Date    MG 2.0 10/14/2017     TSH:  Lab Results   Component Value Date    TSH 2.770 02/24/2017       Patient Active Problem List   Diagnosis    Hypertension    Hyperlipidemia    Hypothyroidism    Degenerative arthritis of cervical spine    Bladder prolapse, female, acquired    Osteoarthritis of knees, bilateral    Chest pain    ALEJANDRE (dyspnea on exertion)    Heart palpitations       Assessment/Plan:    1. Hypertension, unspecified type  meds    2. Mixed hyperlipidemia       3. Chest pain, unspecified type  Stress     4. ALEJANDRE (dyspnea on exertion)     5. Palpittaion Advance Toprol 50 BID    Counseling:  Heart Healthy Lifestyle, Improve BMI, Low Salt Diet, Take Precautions to Prevent Falls and Walk Daily    Return in about 1 month (around 12/13/2017) for AFTER TESTS, Cardiovascular care. .      Electronically signed by Yonny Worrell MD on 11/13/2017 at 3:16 PM

## 2017-12-14 DIAGNOSIS — I10 ESSENTIAL HYPERTENSION: ICD-10-CM

## 2017-12-14 RX ORDER — METOPROLOL SUCCINATE 50 MG/1
TABLET, EXTENDED RELEASE ORAL
Qty: 180 TABLET | Refills: 3 | Status: SHIPPED | OUTPATIENT
Start: 2017-12-14 | End: 2018-11-08 | Stop reason: SDUPTHER

## 2018-01-04 ENCOUNTER — OFFICE VISIT (OUTPATIENT)
Dept: FAMILY MEDICINE CLINIC | Age: 78
End: 2018-01-04

## 2018-01-04 VITALS
HEART RATE: 69 BPM | HEIGHT: 63 IN | DIASTOLIC BLOOD PRESSURE: 72 MMHG | OXYGEN SATURATION: 96 % | BODY MASS INDEX: 35.83 KG/M2 | SYSTOLIC BLOOD PRESSURE: 122 MMHG | WEIGHT: 202.2 LBS

## 2018-01-04 DIAGNOSIS — E78.2 MIXED HYPERLIPIDEMIA: ICD-10-CM

## 2018-01-04 DIAGNOSIS — L21.9 SEBORRHEIC DERMATITIS OF SCALP: ICD-10-CM

## 2018-01-04 DIAGNOSIS — Z23 NEED FOR PNEUMOCOCCAL VACCINATION: ICD-10-CM

## 2018-01-04 DIAGNOSIS — E03.9 ACQUIRED HYPOTHYROIDISM: ICD-10-CM

## 2018-01-04 DIAGNOSIS — I10 HYPERTENSION, UNSPECIFIED TYPE: Primary | ICD-10-CM

## 2018-01-04 LAB — TSH SERPL DL<=0.05 MIU/L-ACNC: 3.96 UIU/ML (ref 0.27–4.2)

## 2018-01-04 PROCEDURE — 4040F PNEUMOC VAC/ADMIN/RCVD: CPT | Performed by: FAMILY MEDICINE

## 2018-01-04 PROCEDURE — G8484 FLU IMMUNIZE NO ADMIN: HCPCS | Performed by: FAMILY MEDICINE

## 2018-01-04 PROCEDURE — G8417 CALC BMI ABV UP PARAM F/U: HCPCS | Performed by: FAMILY MEDICINE

## 2018-01-04 PROCEDURE — 1090F PRES/ABSN URINE INCON ASSESS: CPT | Performed by: FAMILY MEDICINE

## 2018-01-04 PROCEDURE — 3288F FALL RISK ASSESSMENT DOCD: CPT | Performed by: FAMILY MEDICINE

## 2018-01-04 PROCEDURE — G8427 DOCREV CUR MEDS BY ELIG CLIN: HCPCS | Performed by: FAMILY MEDICINE

## 2018-01-04 PROCEDURE — 1123F ACP DISCUSS/DSCN MKR DOCD: CPT | Performed by: FAMILY MEDICINE

## 2018-01-04 PROCEDURE — 1036F TOBACCO NON-USER: CPT | Performed by: FAMILY MEDICINE

## 2018-01-04 PROCEDURE — G0009 ADMIN PNEUMOCOCCAL VACCINE: HCPCS | Performed by: FAMILY MEDICINE

## 2018-01-04 PROCEDURE — 99214 OFFICE O/P EST MOD 30 MIN: CPT | Performed by: FAMILY MEDICINE

## 2018-01-04 PROCEDURE — G8510 SCR DEP NEG, NO PLAN REQD: HCPCS | Performed by: FAMILY MEDICINE

## 2018-01-04 PROCEDURE — G8399 PT W/DXA RESULTS DOCUMENT: HCPCS | Performed by: FAMILY MEDICINE

## 2018-01-04 PROCEDURE — 90670 PCV13 VACCINE IM: CPT | Performed by: FAMILY MEDICINE

## 2018-01-04 RX ORDER — LEVOTHYROXINE SODIUM 0.07 MG/1
TABLET ORAL
Qty: 36 TABLET | Refills: 3 | Status: SHIPPED | OUTPATIENT
Start: 2018-01-04 | End: 2018-05-26 | Stop reason: SDUPTHER

## 2018-01-04 RX ORDER — KETOCONAZOLE 20 MG/ML
SHAMPOO TOPICAL
Qty: 100 ML | Refills: 5 | Status: SHIPPED | OUTPATIENT
Start: 2018-01-04 | End: 2018-07-05 | Stop reason: ALTCHOICE

## 2018-01-04 ASSESSMENT — ENCOUNTER SYMPTOMS
SHORTNESS OF BREATH: 0
BLURRED VISION: 0

## 2018-01-04 ASSESSMENT — PATIENT HEALTH QUESTIONNAIRE - PHQ9
SUM OF ALL RESPONSES TO PHQ9 QUESTIONS 1 & 2: 0
2. FEELING DOWN, DEPRESSED OR HOPELESS: 0
1. LITTLE INTEREST OR PLEASURE IN DOING THINGS: 0
SUM OF ALL RESPONSES TO PHQ QUESTIONS 1-9: 0

## 2018-01-04 NOTE — PATIENT INSTRUCTIONS
Pt advised to use Free and Clear Detergent such as All free and clear; use Dove soap, take warm, not hot showers; avoid long showers; do not get into hot tub. After obtaining consent, and per orders of Dr. Eric Dempsey, injection of Prevnar 13 given in Left deltoid by Prashant Ojeda. Patient instructed to remain in clinic for 20 minutes afterwards, and to report any adverse reaction to me immediately.

## 2018-01-04 NOTE — PROGRESS NOTES
Subjective  Efra Garner, 68 y.o. female presents today with:  Chief Complaint   Patient presents with    3 Month Follow-Up       Hypertension   This is a chronic problem. The current episode started more than 1 year ago. The problem has been rapidly improving since onset. The problem is controlled. Pertinent negatives include no blurred vision, chest pain, headaches, palpitations or shortness of breath. Agents associated with hypertension include NSAIDs. Past treatments include beta blockers. The current treatment provides significant improvement. There are no compliance problems. There is no history of kidney disease. There is no history of chronic renal disease. Here today for f/u on HTN, hyperlipidemia, OA of multiple sites, and hypothyroidism. Followed by Dr. Rubi Ivan for OA. C/o dry scalp that is itchy. Review of Systems   Eyes: Negative for blurred vision. Respiratory: Negative for shortness of breath. Cardiovascular: Negative for chest pain and palpitations. Neurological: Negative for headaches.        Past Medical History:   Diagnosis Date    Bladder prolapse, female, acquired     Degenerative arthritis of cervical spine 6/29/2012    Diverticulosis     History of bone density study 09/2014    Hyperlipidemia 6/29/2012    Hypertension 6/29/2012    Hypothyroidism 6/29/2012    Internal hemorrhoid     Osteoarthritis of knees, bilateral     Osteopenia 09/2014    Seborrheic dermatitis of scalp      Past Surgical History:   Procedure Laterality Date    ARTHROSCOPY / ARTHROTOMY KNEE Right 10/14/2016    RIGHT KNEE ARTHROSCOPY / LOOSE BODY / PAT ON ADMIT performed by Jimbo Arzate MD at [facility]  11/2015    CARPAL TUNNEL RELEASE Right     CATARACT REMOVAL WITH IMPLANT Bilateral     COLONOSCOPY  12/15/05    CYSTOCELE REPAIR      HYSTERECTOMY      RECTOCELE REPAIR       Social History     Social History    Marital status:      Spouse name: N/A   

## 2018-07-05 ENCOUNTER — OFFICE VISIT (OUTPATIENT)
Dept: FAMILY MEDICINE CLINIC | Age: 78
End: 2018-07-05
Payer: MEDICARE

## 2018-07-05 VITALS
HEART RATE: 72 BPM | WEIGHT: 205 LBS | OXYGEN SATURATION: 97 % | HEIGHT: 63 IN | DIASTOLIC BLOOD PRESSURE: 78 MMHG | SYSTOLIC BLOOD PRESSURE: 136 MMHG | BODY MASS INDEX: 36.32 KG/M2

## 2018-07-05 DIAGNOSIS — M15.9 GENERALIZED OSTEOARTHRITIS OF MULTIPLE SITES: ICD-10-CM

## 2018-07-05 DIAGNOSIS — E55.9 VITAMIN D DEFICIENCY: ICD-10-CM

## 2018-07-05 DIAGNOSIS — Z23 NEED FOR SHINGLES VACCINE: ICD-10-CM

## 2018-07-05 DIAGNOSIS — F41.1 GAD (GENERALIZED ANXIETY DISORDER): ICD-10-CM

## 2018-07-05 DIAGNOSIS — E78.2 MIXED HYPERLIPIDEMIA: ICD-10-CM

## 2018-07-05 DIAGNOSIS — E03.9 ACQUIRED HYPOTHYROIDISM: ICD-10-CM

## 2018-07-05 DIAGNOSIS — I10 HYPERTENSION, UNSPECIFIED TYPE: Primary | ICD-10-CM

## 2018-07-05 PROCEDURE — G8417 CALC BMI ABV UP PARAM F/U: HCPCS | Performed by: FAMILY MEDICINE

## 2018-07-05 PROCEDURE — G8399 PT W/DXA RESULTS DOCUMENT: HCPCS | Performed by: FAMILY MEDICINE

## 2018-07-05 PROCEDURE — 1123F ACP DISCUSS/DSCN MKR DOCD: CPT | Performed by: FAMILY MEDICINE

## 2018-07-05 PROCEDURE — G8427 DOCREV CUR MEDS BY ELIG CLIN: HCPCS | Performed by: FAMILY MEDICINE

## 2018-07-05 PROCEDURE — 1036F TOBACCO NON-USER: CPT | Performed by: FAMILY MEDICINE

## 2018-07-05 PROCEDURE — 99214 OFFICE O/P EST MOD 30 MIN: CPT | Performed by: FAMILY MEDICINE

## 2018-07-05 PROCEDURE — 4040F PNEUMOC VAC/ADMIN/RCVD: CPT | Performed by: FAMILY MEDICINE

## 2018-07-05 PROCEDURE — 1090F PRES/ABSN URINE INCON ASSESS: CPT | Performed by: FAMILY MEDICINE

## 2018-07-05 RX ORDER — BUSPIRONE HYDROCHLORIDE 5 MG/1
5 TABLET ORAL 2 TIMES DAILY PRN
Qty: 60 TABLET | Refills: 0 | Status: SHIPPED | OUTPATIENT
Start: 2018-07-05 | End: 2018-07-24 | Stop reason: SDUPTHER

## 2018-07-05 ASSESSMENT — ENCOUNTER SYMPTOMS
SHORTNESS OF BREATH: 0
ABDOMINAL PAIN: 0

## 2018-07-05 NOTE — PROGRESS NOTES
Adia Rich MD at [address]  11/2015    CARPAL TUNNEL RELEASE Right     CATARACT REMOVAL WITH IMPLANT Bilateral     COLONOSCOPY  12/15/05    CYSTOCELE REPAIR      HYSTERECTOMY      RECTOCELE REPAIR       Social History     Social History    Marital status:      Spouse name: N/A    Number of children: 2    Years of education: N/A     Occupational History    Not on file. Social History Main Topics    Smoking status: Former Smoker     Packs/day: 0.50     Years: 15.00     Types: Cigarettes     Quit date: 8/30/1972    Smokeless tobacco: Never Used    Alcohol use No    Drug use: No    Sexual activity: Not on file     Other Topics Concern    Not on file     Social History Narrative    Has one son and one daughter.  is a few years older and now has dementia and is on aricept. History reviewed. No pertinent family history.   No Known Allergies  Current Outpatient Prescriptions   Medication Sig Dispense Refill    busPIRone (BUSPAR) 5 MG tablet Take 1 tablet by mouth 2 times daily as needed (anxiety) 60 tablet 0    zoster recombinant adjuvanted vaccine (SHINGRIX) 50 MCG SUSR injection Inject 0.5 mLs into the muscle once for 1 dose 0.5 mL 1    levothyroxine (SYNTHROID) 75 MCG tablet TAKE 1 TABLET BY MOUTH ON  SUNDAY, TUESDAY, THURSDAY  AND SATURDAY 52 tablet 3    pravastatin (PRAVACHOL) 40 MG tablet Take 1 tablet by mouth nightly 90 tablet 3    celecoxib (CELEBREX) 200 MG capsule TAKE 1 CAPSULE BY MOUTH  DAILY 90 capsule 3    levothyroxine (SYNTHROID) 50 MCG tablet TAKE 1 TABLET BY MOUTH ON  MONDAY, WEDNESDAY AND  FRIDAY ALTERNATING WITH THE 75MCG 39 tablet 3    metoprolol succinate (TOPROL XL) 50 MG extended release tablet Take 1 tablet by mouth two times daily 180 tablet 3    calcium carbonate 600 MG TABS tablet Take 1 tablet by mouth daily      vitamin D (CHOLECALCIFEROL) 1000 UNIT TABS tablet Take 1,000 Units by mouth daily       No current facility-administered medications for this visit. Objective    Vitals:    07/05/18 0929   BP: 136/78   Pulse: 72   SpO2: 97%   Weight: 205 lb (93 kg)   Height: 5' 3\" (1.6 m)       Physical Exam   Constitutional: She appears well-developed and well-nourished. HENT:   Head: Normocephalic and atraumatic. Right Ear: Tympanic membrane, external ear and ear canal normal.   Left Ear: Tympanic membrane, external ear and ear canal normal.   Nose: Nose normal.   Mouth/Throat: Uvula is midline, oropharynx is clear and moist and mucous membranes are normal.   Neck: Normal range of motion. Neck supple. Cardiovascular: Normal rate, regular rhythm and normal heart sounds. No murmur heard. Pulmonary/Chest: Effort normal and breath sounds normal. She has no wheezes. Musculoskeletal: Normal range of motion. Lymphadenopathy:     She has no cervical adenopathy. Skin: Skin is warm and dry. No rash noted. Lab Results   Component Value Date    CHOL 171 10/14/2017    CHOL 204 (H) 02/24/2017    CHOL 198 01/21/2016     Lab Results   Component Value Date    TRIG 162 10/14/2017    TRIG 185 02/24/2017    TRIG 146 01/21/2016     Lab Results   Component Value Date    HDL 50 10/14/2017    HDL 57 02/24/2017    HDL 59 01/21/2016     Lab Results   Component Value Date    LDLCALC 89 10/14/2017    LDLCALC 110 02/24/2017    LDLCALC 110 01/21/2016     No results found for: LABVLDL, VLDL  Lab Results   Component Value Date    CHOLHDLRATIO 2.9 03/14/2013    CHOLHDLRATIO 2.8 10/01/2012    CHOLHDLRATIO 4.2 07/02/2012     Lab Results   Component Value Date    TSH 3.960 01/04/2018     Assessment & Plan    Diagnosis Orders   1. Hypertension, unspecified type  ALT    AST    Basic Metabolic Panel    CBC Auto Differential   2. Mixed hyperlipidemia  ALT    AST    Lipid Panel   3. Acquired hypothyroidism  TSH without Reflex   4. Generalized osteoarthritis of multiple sites     5.  ELIZABETH (generalized anxiety disorder)  busPIRone (BUSPAR) 5 MG

## 2018-07-23 ENCOUNTER — TELEPHONE (OUTPATIENT)
Dept: FAMILY MEDICINE CLINIC | Age: 78
End: 2018-07-23

## 2018-07-23 NOTE — TELEPHONE ENCOUNTER
LOV 7/5/18 pt called and states that [facility] has not received Buspirone yet. Pt is requesting rx for Buspirone be sent to [facility]. Pt asks that we call her at [phone] if rx can be sent to [facility].

## 2018-07-24 DIAGNOSIS — F41.1 GAD (GENERALIZED ANXIETY DISORDER): ICD-10-CM

## 2018-07-24 RX ORDER — BUSPIRONE HYDROCHLORIDE 5 MG/1
5 TABLET ORAL 2 TIMES DAILY PRN
Qty: 60 TABLET | Refills: 0 | Status: SHIPPED | OUTPATIENT
Start: 2018-07-24 | End: 2020-01-28 | Stop reason: SDUPTHER

## 2018-09-21 ENCOUNTER — NURSE ONLY (OUTPATIENT)
Dept: FAMILY MEDICINE CLINIC | Age: 78
End: 2018-09-21
Payer: MEDICARE

## 2018-09-21 DIAGNOSIS — Z23 NEED FOR INFLUENZA VACCINATION: Primary | ICD-10-CM

## 2018-09-21 PROCEDURE — G0008 ADMIN INFLUENZA VIRUS VAC: HCPCS | Performed by: FAMILY MEDICINE

## 2018-09-21 PROCEDURE — 90662 IIV NO PRSV INCREASED AG IM: CPT | Performed by: FAMILY MEDICINE

## 2018-09-21 NOTE — PROGRESS NOTES
Pt here with , getting flu shot    Vaccine Information Sheet, \"Influenza - Inactivated\"  given to French Cherry, or parent/legal guardian of  French Cherry and verbalized understanding. Patient responses:    Have you ever had a reaction to a flu vaccine? No  Are you able to eat eggs without adverse effects? Yes  Do you have any current illness? No  Have you ever had [place] Syndrome? No    Flu vaccine given per order. Please see immunization tab.

## 2018-09-25 DIAGNOSIS — E55.9 VITAMIN D DEFICIENCY: ICD-10-CM

## 2018-09-25 DIAGNOSIS — E03.9 ACQUIRED HYPOTHYROIDISM: ICD-10-CM

## 2018-09-25 DIAGNOSIS — I10 HYPERTENSION, UNSPECIFIED TYPE: ICD-10-CM

## 2018-09-25 DIAGNOSIS — E78.2 MIXED HYPERLIPIDEMIA: ICD-10-CM

## 2018-09-25 LAB
ALT SERPL-CCNC: 15 U/L (ref 0–33)
ANION GAP SERPL CALCULATED.3IONS-SCNC: 12 MEQ/L (ref 7–13)
AST SERPL-CCNC: 20 U/L (ref 0–35)
BASOPHILS ABSOLUTE: 0.1 K/UL (ref 0–0.2)
BASOPHILS RELATIVE PERCENT: 1.1 %
BUN BLDV-MCNC: 17 MG/DL (ref 8–23)
CALCIUM SERPL-MCNC: 9.3 MG/DL (ref 8.6–10.2)
CHLORIDE BLD-SCNC: 101 MEQ/L (ref 98–107)
CHOLESTEROL, TOTAL: 188 MG/DL (ref 0–199)
CO2: 27 MEQ/L (ref 22–29)
CREAT SERPL-MCNC: 0.66 MG/DL (ref 0.5–0.9)
EOSINOPHILS ABSOLUTE: 0.1 K/UL (ref 0–0.7)
EOSINOPHILS RELATIVE PERCENT: 1.6 %
GFR AFRICAN AMERICAN: >60
GFR NON-AFRICAN AMERICAN: >60
GLUCOSE BLD-MCNC: 87 MG/DL (ref 74–109)
HCT VFR BLD CALC: 49.9 % (ref 37–47)
HDLC SERPL-MCNC: 51 MG/DL (ref 40–59)
HEMOGLOBIN: 17 G/DL (ref 12–16)
LDL CHOLESTEROL CALCULATED: 101 MG/DL (ref 0–129)
LYMPHOCYTES ABSOLUTE: 1.7 K/UL (ref 1–4.8)
LYMPHOCYTES RELATIVE PERCENT: 32.5 %
MCH RBC QN AUTO: 31.1 PG (ref 27–31.3)
MCHC RBC AUTO-ENTMCNC: 34 % (ref 33–37)
MCV RBC AUTO: 91.5 FL (ref 82–100)
MONOCYTES ABSOLUTE: 0.5 K/UL (ref 0.2–0.8)
MONOCYTES RELATIVE PERCENT: 10.5 %
NEUTROPHILS ABSOLUTE: 2.8 K/UL (ref 1.4–6.5)
NEUTROPHILS RELATIVE PERCENT: 54.3 %
PDW BLD-RTO: 13.6 % (ref 11.5–14.5)
PLATELET # BLD: 236 K/UL (ref 130–400)
POTASSIUM SERPL-SCNC: 4.5 MEQ/L (ref 3.5–5.1)
RBC # BLD: 5.45 M/UL (ref 4.2–5.4)
SODIUM BLD-SCNC: 140 MEQ/L (ref 132–144)
TRIGL SERPL-MCNC: 178 MG/DL (ref 0–200)
TSH SERPL DL<=0.05 MIU/L-ACNC: 2.77 UIU/ML (ref 0.27–4.2)
VITAMIN D 25-HYDROXY: 44.4 NG/ML (ref 30–100)
WBC # BLD: 5.2 K/UL (ref 4.8–10.8)

## 2018-09-30 DIAGNOSIS — D58.2 ELEVATED HEMOGLOBIN (HCC): Primary | ICD-10-CM

## 2018-10-11 ENCOUNTER — OFFICE VISIT (OUTPATIENT)
Dept: FAMILY MEDICINE CLINIC | Age: 78
End: 2018-10-11
Payer: MEDICARE

## 2018-10-11 VITALS
HEART RATE: 64 BPM | WEIGHT: 205 LBS | TEMPERATURE: 97.7 F | SYSTOLIC BLOOD PRESSURE: 152 MMHG | DIASTOLIC BLOOD PRESSURE: 80 MMHG | HEIGHT: 63 IN | BODY MASS INDEX: 36.32 KG/M2 | OXYGEN SATURATION: 98 %

## 2018-10-11 DIAGNOSIS — E78.2 MIXED HYPERLIPIDEMIA: ICD-10-CM

## 2018-10-11 DIAGNOSIS — I10 HYPERTENSION, UNSPECIFIED TYPE: ICD-10-CM

## 2018-10-11 DIAGNOSIS — D58.2 ELEVATED HEMOGLOBIN (HCC): ICD-10-CM

## 2018-10-11 DIAGNOSIS — R06.02 SHORTNESS OF BREATH: Primary | ICD-10-CM

## 2018-10-11 DIAGNOSIS — E03.9 ACQUIRED HYPOTHYROIDISM: ICD-10-CM

## 2018-10-11 PROCEDURE — 1090F PRES/ABSN URINE INCON ASSESS: CPT | Performed by: FAMILY MEDICINE

## 2018-10-11 PROCEDURE — 99214 OFFICE O/P EST MOD 30 MIN: CPT | Performed by: FAMILY MEDICINE

## 2018-10-11 PROCEDURE — G8399 PT W/DXA RESULTS DOCUMENT: HCPCS | Performed by: FAMILY MEDICINE

## 2018-10-11 PROCEDURE — 1123F ACP DISCUSS/DSCN MKR DOCD: CPT | Performed by: FAMILY MEDICINE

## 2018-10-11 PROCEDURE — G8482 FLU IMMUNIZE ORDER/ADMIN: HCPCS | Performed by: FAMILY MEDICINE

## 2018-10-11 PROCEDURE — G8427 DOCREV CUR MEDS BY ELIG CLIN: HCPCS | Performed by: FAMILY MEDICINE

## 2018-10-11 PROCEDURE — 1036F TOBACCO NON-USER: CPT | Performed by: FAMILY MEDICINE

## 2018-10-11 PROCEDURE — 1101F PT FALLS ASSESS-DOCD LE1/YR: CPT | Performed by: FAMILY MEDICINE

## 2018-10-11 PROCEDURE — G8417 CALC BMI ABV UP PARAM F/U: HCPCS | Performed by: FAMILY MEDICINE

## 2018-10-11 PROCEDURE — 4040F PNEUMOC VAC/ADMIN/RCVD: CPT | Performed by: FAMILY MEDICINE

## 2018-10-11 ASSESSMENT — ENCOUNTER SYMPTOMS
SORE THROAT: 0
ABDOMINAL PAIN: 0
SHORTNESS OF BREATH: 1

## 2018-11-08 DIAGNOSIS — I10 ESSENTIAL HYPERTENSION: ICD-10-CM

## 2018-11-09 RX ORDER — METOPROLOL SUCCINATE 50 MG/1
TABLET, EXTENDED RELEASE ORAL
Qty: 180 TABLET | Refills: 3 | Status: SHIPPED | OUTPATIENT
Start: 2018-11-09 | End: 2019-04-29 | Stop reason: SDUPTHER

## 2018-11-16 DIAGNOSIS — D58.2 ELEVATED HEMOGLOBIN (HCC): ICD-10-CM

## 2018-11-16 LAB — HEMOGLOBIN: 16.7 G/DL (ref 12–16)

## 2019-02-11 ENCOUNTER — OFFICE VISIT (OUTPATIENT)
Dept: FAMILY MEDICINE CLINIC | Age: 79
End: 2019-02-11
Payer: MEDICARE

## 2019-02-11 VITALS
HEIGHT: 62 IN | BODY MASS INDEX: 37.17 KG/M2 | TEMPERATURE: 97.9 F | WEIGHT: 202 LBS | SYSTOLIC BLOOD PRESSURE: 132 MMHG | HEART RATE: 63 BPM | DIASTOLIC BLOOD PRESSURE: 82 MMHG | OXYGEN SATURATION: 98 %

## 2019-02-11 DIAGNOSIS — G89.29 CHRONIC PAIN OF RIGHT KNEE: ICD-10-CM

## 2019-02-11 DIAGNOSIS — I10 HYPERTENSION, UNSPECIFIED TYPE: Primary | ICD-10-CM

## 2019-02-11 DIAGNOSIS — E03.9 ACQUIRED HYPOTHYROIDISM: ICD-10-CM

## 2019-02-11 DIAGNOSIS — M25.561 CHRONIC PAIN OF RIGHT KNEE: ICD-10-CM

## 2019-02-11 DIAGNOSIS — F41.1 GAD (GENERALIZED ANXIETY DISORDER): ICD-10-CM

## 2019-02-11 PROCEDURE — 99213 OFFICE O/P EST LOW 20 MIN: CPT | Performed by: NURSE PRACTITIONER

## 2019-02-11 ASSESSMENT — ENCOUNTER SYMPTOMS
COUGH: 0
SHORTNESS OF BREATH: 0

## 2019-02-26 ENCOUNTER — HOSPITAL ENCOUNTER (OUTPATIENT)
Dept: ORTHOPEDIC SURGERY | Age: 79
Discharge: HOME | End: 2019-02-28
Payer: MEDICARE

## 2019-02-26 DIAGNOSIS — M47.812 OSTEOARTHRITIS OF CERVICAL SPINE, UNSPECIFIED SPINAL OSTEOARTHRITIS COMPLICATION STATUS: ICD-10-CM

## 2019-02-26 DIAGNOSIS — R52 PAIN: ICD-10-CM

## 2019-02-26 PROCEDURE — 73564 X-RAY EXAM KNEE 4 OR MORE: CPT

## 2019-02-26 RX ORDER — CELECOXIB 200 MG/1
CAPSULE ORAL
Qty: 90 CAPSULE | Refills: 3 | Status: SHIPPED | OUTPATIENT
Start: 2019-02-26 | End: 2020-01-28 | Stop reason: SDUPTHER

## 2019-04-15 DIAGNOSIS — E78.2 MIXED HYPERLIPIDEMIA: ICD-10-CM

## 2019-04-15 DIAGNOSIS — E03.9 ACQUIRED HYPOTHYROIDISM: ICD-10-CM

## 2019-04-15 DIAGNOSIS — I10 ESSENTIAL HYPERTENSION: ICD-10-CM

## 2019-04-15 RX ORDER — METOPROLOL SUCCINATE 50 MG/1
TABLET, EXTENDED RELEASE ORAL
Qty: 180 TABLET | Refills: 3 | Status: CANCELLED | OUTPATIENT
Start: 2019-04-15

## 2019-04-15 RX ORDER — METOPROLOL SUCCINATE 50 MG/1
TABLET, EXTENDED RELEASE ORAL
Qty: 180 TABLET | Refills: 3 | OUTPATIENT
Start: 2019-04-15

## 2019-04-15 RX ORDER — PRAVASTATIN SODIUM 40 MG
40 TABLET ORAL NIGHTLY
Qty: 90 TABLET | Refills: 3 | Status: SHIPPED | OUTPATIENT
Start: 2019-04-15 | End: 2020-02-11 | Stop reason: SDUPTHER

## 2019-04-15 RX ORDER — PRAVASTATIN SODIUM 40 MG
40 TABLET ORAL NIGHTLY
Qty: 90 TABLET | Refills: 3 | Status: CANCELLED | OUTPATIENT
Start: 2019-04-15

## 2019-04-15 RX ORDER — LEVOTHYROXINE SODIUM 0.05 MG/1
TABLET ORAL
Qty: 39 TABLET | Refills: 3 | Status: CANCELLED | OUTPATIENT
Start: 2019-04-15

## 2019-04-15 RX ORDER — LEVOTHYROXINE SODIUM 0.07 MG/1
TABLET ORAL
Qty: 52 TABLET | Refills: 3 | Status: CANCELLED | OUTPATIENT
Start: 2019-04-15

## 2019-04-15 RX ORDER — LEVOTHYROXINE SODIUM 0.05 MG/1
TABLET ORAL
Qty: 39 TABLET | Refills: 3 | Status: SHIPPED | OUTPATIENT
Start: 2019-04-15 | End: 2020-01-28 | Stop reason: SDUPTHER

## 2019-04-15 RX ORDER — LEVOTHYROXINE SODIUM 0.07 MG/1
TABLET ORAL
Qty: 52 TABLET | Refills: 3 | Status: SHIPPED | OUTPATIENT
Start: 2019-04-15 | End: 2020-01-28 | Stop reason: SDUPTHER

## 2019-04-23 DIAGNOSIS — I10 ESSENTIAL HYPERTENSION: ICD-10-CM

## 2019-04-24 RX ORDER — METOPROLOL SUCCINATE 50 MG/1
TABLET, EXTENDED RELEASE ORAL
Qty: 180 TABLET | Refills: 3 | OUTPATIENT
Start: 2019-04-24

## 2019-04-29 DIAGNOSIS — I10 ESSENTIAL HYPERTENSION: ICD-10-CM

## 2019-04-29 RX ORDER — METOPROLOL SUCCINATE 50 MG/1
TABLET, EXTENDED RELEASE ORAL
Qty: 60 TABLET | Refills: 0 | Status: SHIPPED | OUTPATIENT
Start: 2019-04-29 | End: 2019-05-20 | Stop reason: SDUPTHER

## 2019-05-10 ENCOUNTER — OFFICE VISIT (OUTPATIENT)
Dept: FAMILY MEDICINE CLINIC | Age: 79
End: 2019-05-10
Payer: MEDICARE

## 2019-05-10 VITALS
DIASTOLIC BLOOD PRESSURE: 84 MMHG | HEART RATE: 59 BPM | TEMPERATURE: 97.7 F | SYSTOLIC BLOOD PRESSURE: 124 MMHG | HEIGHT: 62 IN | BODY MASS INDEX: 36.8 KG/M2 | WEIGHT: 200 LBS | OXYGEN SATURATION: 98 %

## 2019-05-10 DIAGNOSIS — I10 ESSENTIAL HYPERTENSION: Primary | ICD-10-CM

## 2019-05-10 DIAGNOSIS — D58.2 ELEVATED HEMOGLOBIN (HCC): ICD-10-CM

## 2019-05-10 DIAGNOSIS — I10 ESSENTIAL HYPERTENSION: ICD-10-CM

## 2019-05-10 DIAGNOSIS — F41.1 GAD (GENERALIZED ANXIETY DISORDER): ICD-10-CM

## 2019-05-10 LAB
ALBUMIN SERPL-MCNC: 4.4 G/DL (ref 3.5–4.6)
ALP BLD-CCNC: 76 U/L (ref 40–130)
ALT SERPL-CCNC: 13 U/L (ref 0–33)
ANION GAP SERPL CALCULATED.3IONS-SCNC: 15 MEQ/L (ref 9–15)
AST SERPL-CCNC: 19 U/L (ref 0–35)
BASOPHILS ABSOLUTE: 0 K/UL (ref 0–0.2)
BASOPHILS RELATIVE PERCENT: 0.8 %
BILIRUB SERPL-MCNC: 0.8 MG/DL (ref 0.2–0.7)
BUN BLDV-MCNC: 16 MG/DL (ref 8–23)
CALCIUM SERPL-MCNC: 9.4 MG/DL (ref 8.5–9.9)
CHLORIDE BLD-SCNC: 100 MEQ/L (ref 95–107)
CO2: 26 MEQ/L (ref 20–31)
CREAT SERPL-MCNC: 0.67 MG/DL (ref 0.5–0.9)
EOSINOPHILS ABSOLUTE: 0.1 K/UL (ref 0–0.7)
EOSINOPHILS RELATIVE PERCENT: 1.4 %
GFR AFRICAN AMERICAN: >60
GFR NON-AFRICAN AMERICAN: >60
GLOBULIN: 2.9 G/DL (ref 2.3–3.5)
GLUCOSE BLD-MCNC: 61 MG/DL (ref 70–99)
HCT VFR BLD CALC: 50.1 % (ref 37–47)
HEMOGLOBIN: 17 G/DL (ref 12–16)
LYMPHOCYTES ABSOLUTE: 1.7 K/UL (ref 1–4.8)
LYMPHOCYTES RELATIVE PERCENT: 34.7 %
MCH RBC QN AUTO: 31.4 PG (ref 27–31.3)
MCHC RBC AUTO-ENTMCNC: 34 % (ref 33–37)
MCV RBC AUTO: 92.3 FL (ref 82–100)
MONOCYTES ABSOLUTE: 0.6 K/UL (ref 0.2–0.8)
MONOCYTES RELATIVE PERCENT: 12.8 %
NEUTROPHILS ABSOLUTE: 2.4 K/UL (ref 1.4–6.5)
NEUTROPHILS RELATIVE PERCENT: 50.3 %
PDW BLD-RTO: 13.7 % (ref 11.5–14.5)
PLATELET # BLD: 236 K/UL (ref 130–400)
POTASSIUM SERPL-SCNC: 4.4 MEQ/L (ref 3.4–4.9)
RBC # BLD: 5.43 M/UL (ref 4.2–5.4)
SODIUM BLD-SCNC: 141 MEQ/L (ref 135–144)
TOTAL PROTEIN: 7.3 G/DL (ref 6.3–8)
WBC # BLD: 4.8 K/UL (ref 4.8–10.8)

## 2019-05-10 PROCEDURE — 99213 OFFICE O/P EST LOW 20 MIN: CPT | Performed by: NURSE PRACTITIONER

## 2019-05-10 ASSESSMENT — PATIENT HEALTH QUESTIONNAIRE - PHQ9
2. FEELING DOWN, DEPRESSED OR HOPELESS: 0
SUM OF ALL RESPONSES TO PHQ QUESTIONS 1-9: 1
SUM OF ALL RESPONSES TO PHQ QUESTIONS 1-9: 1
1. LITTLE INTEREST OR PLEASURE IN DOING THINGS: 1
SUM OF ALL RESPONSES TO PHQ9 QUESTIONS 1 & 2: 1

## 2019-05-10 ASSESSMENT — ENCOUNTER SYMPTOMS
COUGH: 0
SHORTNESS OF BREATH: 0

## 2019-05-10 NOTE — PROGRESS NOTES
Subjective  Chief Complaint   Patient presents with    3 Month Follow-Up     3 month f/u HTN        Hypertension   This is a chronic problem. The current episode started more than 1 year ago. The problem is unchanged. The problem is controlled. Pertinent negatives include no chest pain, headaches, malaise/fatigue, palpitations, peripheral edema or shortness of breath. Agents associated with hypertension include thyroid hormones. Risk factors for coronary artery disease include obesity and post-menopausal state. Past treatments include beta blockers. Due for recheck labs. Pt here for 3 month check up. She did see Dr. Chico Holcomb and got a cortisone injection in her knee which has helped. She is supposed to be wearing a brace on that knee as well which helps. Has buspar which she only takes as needed for anxiety; however when she takes it consistently it causes her to feel like she is \"crawling out of her skin\" and will cause her eyes to blur. She did take a 1/2 tablet the other day and it did help and she didn't have any side effects with that. Does not want to take a medication every day. Only needs something once weekly if that.      Past Medical History:   Diagnosis Date    Bladder prolapse, female, acquired     Chest pain 10/14/2017    Degenerative arthritis of cervical spine 6/29/2012    Diverticulosis     ALEJANDRE (dyspnea on exertion) 10/14/2017    ELIZABETH (generalized anxiety disorder)     Generalized osteoarthritis of multiple sites     knees and neck    Heart palpitations 11/13/2017    History of bone density study 09/2014    Hyperlipidemia 6/29/2012    Hypertension 6/29/2012    Hypothyroidism 6/29/2012    Internal hemorrhoid     Osteopenia 09/2014    Seborrheic dermatitis of scalp      Patient Active Problem List    Diagnosis Date Noted    Generalized osteoarthritis of multiple sites     ELIZABETH (generalized anxiety disorder)     Seborrheic dermatitis of scalp     Osteoarthritis of knees, and now has dementia and is on aricept. Current Outpatient Medications on File Prior to Visit   Medication Sig Dispense Refill    metoprolol succinate (TOPROL XL) 50 MG extended release tablet TAKE 1 TABLET BY MOUTH TWO  TIMES DAILY 60 tablet 0    pravastatin (PRAVACHOL) 40 MG tablet Take 1 tablet by mouth nightly 90 tablet 3    levothyroxine (SYNTHROID) 75 MCG tablet TAKE 1 TABLET BY MOUTH ON  SUNDAY, TUESDAY, THURSDAY  AND SATURDAY 52 tablet 3    levothyroxine (SYNTHROID) 50 MCG tablet TAKE 1 TABLET BY MOUTH ON  MONDAY, WEDNESDAY AND  FRIDAY ALTERNATING WITH THE 75MCG 39 tablet 3    celecoxib (CELEBREX) 200 MG capsule TAKE 1 CAPSULE BY MOUTH  DAILY 90 capsule 3    busPIRone (BUSPAR) 5 MG tablet Take 1 tablet by mouth 2 times daily as needed (anxiety) 60 tablet 0    calcium carbonate 600 MG TABS tablet Take 1 tablet by mouth daily      vitamin D (CHOLECALCIFEROL) 1000 UNIT TABS tablet Take 1,000 Units by mouth daily       No current facility-administered medications on file prior to visit. No Known Allergies    Review of Systems   Constitutional: Negative for chills, diaphoresis, fatigue, fever and malaise/fatigue. HENT: Negative for congestion. Respiratory: Negative for cough and shortness of breath. Cardiovascular: Negative for chest pain, palpitations and leg swelling. Musculoskeletal: Negative for arthralgias. Neurological: Negative for dizziness and headaches. Psychiatric/Behavioral: The patient is nervous/anxious. Objective  Vitals:    05/10/19 0917   BP: 124/84   Site: Right Upper Arm   Position: Sitting   Cuff Size: Large Adult   Pulse: 59   Temp: 97.7 °F (36.5 °C)   TempSrc: Tympanic   SpO2: 98%   Weight: 200 lb (90.7 kg)   Height: 5' 2\" (1.575 m)     Physical Exam   Constitutional: She is oriented to person, place, and time. She appears well-developed and well-nourished. No distress. HENT:   Head: Normocephalic and atraumatic.    Right Ear: External ear normal. Metabolic Panel     Standing Status:   Future     Number of Occurrences:   1     Standing Expiration Date:   5/10/2020       No orders of the defined types were placed in this encounter. Return in about 3 months (around 8/10/2019) for check up.     Hollie Gosselin, APRN - CNP

## 2019-05-13 DIAGNOSIS — D58.2 ELEVATED HEMOGLOBIN (HCC): Primary | ICD-10-CM

## 2019-05-19 NOTE — PROGRESS NOTES
OB attending  PPD #2    Pt doing well, pain well controlled. No overnight events, no acute complaints.    Ambulating: Yes  Voiding: Yes  Flatus: Yes  Bowel movements: Yes   Breast or bottle feeding: Breastfeeding  Diet: Regular    PAST MEDICAL & SURGICAL HISTORY:  No pertinent past medical history  No significant past surgical history      Physical Exam  Vital Signs Last 24 Hrs  T(C): 36.7 (19 May 2019 00:02), Max: 36.7 (19 May 2019 00:02)  T(F): 98 (19 May 2019 00:02), Max: 98 (19 May 2019 00:02)  HR: 98 (19 May 2019 00:02) (82 - 98)  BP: 122/67 (19 May 2019 00:02) (99/51 - 122/67)  BP(mean): --  RR: 20 (19 May 2019 00:02) (18 - 20)  SpO2: --  Gen: AAOx3, NAD  Abd: Soft, nontender, nondistended, BS+  Fundus: Firm, below umbilicus  Lochia: normal  Ext: No calf tenderness, no swelling    Labs:                        12.0   12.75 )-----------( 144      ( 18 May 2019 04:30 )             35.1         A/P: s/p , PPD #2, doing well  - d/c home Constitutional: She appears well-developed and well-nourished. HENT:   Head: Normocephalic and atraumatic. Right Ear: Tympanic membrane, external ear and ear canal normal.   Left Ear: Tympanic membrane, external ear and ear canal normal.   Nose: Nose normal.   Mouth/Throat: Uvula is midline, oropharynx is clear and moist and mucous membranes are normal.   Neck: Normal range of motion. Neck supple. Cardiovascular: Normal rate, regular rhythm and normal heart sounds. No murmur heard. Pulmonary/Chest: Effort normal and breath sounds normal. She has no wheezes. Musculoskeletal: Normal range of motion. Lymphadenopathy:     She has no cervical adenopathy. Skin: Skin is warm and dry. No rash noted. Lab Results   Component Value Date    CHOL 188 09/25/2018    CHOL 171 10/14/2017    CHOL 204 (H) 02/24/2017     Lab Results   Component Value Date    TRIG 178 09/25/2018    TRIG 162 10/14/2017    TRIG 185 02/24/2017     Lab Results   Component Value Date    HDL 51 09/25/2018    HDL 50 10/14/2017    HDL 57 02/24/2017     Lab Results   Component Value Date    LDLCALC 101 09/25/2018    LDLCALC 89 10/14/2017    LDLCALC 110 02/24/2017     No results found for: LABVLDL, VLDL  Lab Results   Component Value Date    CHOLHDLRATIO 2.9 03/14/2013    CHOLHDLRATIO 2.8 10/01/2012    CHOLHDLRATIO 4.2 07/02/2012     Lab Results   Component Value Date    TSH 2.770 09/25/2018     Assessment & Plan    Diagnosis Orders   1. Shortness of breath     2. Hypertension, unspecified type     3. Mixed hyperlipidemia     4. Elevated hemoglobin (HCC)  Hemoglobin   5. Acquired hypothyroidism       BP up today most likely due to stress. Had a stress test test last fall and has an appointment to see her cardiologist/Dr. Lorrie Edmonds next month. Lipids and hypothyroidism controlled so continue same. Recent labs reviewed with her which were normal except for elevated HG. She was referred to hematology and has an appointment next month.  Declined

## 2019-05-20 ENCOUNTER — OFFICE VISIT (OUTPATIENT)
Dept: CARDIOLOGY CLINIC | Age: 79
End: 2019-05-20
Payer: MEDICARE

## 2019-05-20 VITALS
WEIGHT: 202 LBS | HEART RATE: 82 BPM | RESPIRATION RATE: 16 BRPM | BODY MASS INDEX: 37.17 KG/M2 | HEIGHT: 62 IN | DIASTOLIC BLOOD PRESSURE: 76 MMHG | SYSTOLIC BLOOD PRESSURE: 130 MMHG | OXYGEN SATURATION: 97 %

## 2019-05-20 DIAGNOSIS — R06.09 DOE (DYSPNEA ON EXERTION): ICD-10-CM

## 2019-05-20 DIAGNOSIS — I10 ESSENTIAL HYPERTENSION: ICD-10-CM

## 2019-05-20 DIAGNOSIS — E78.2 MIXED HYPERLIPIDEMIA: ICD-10-CM

## 2019-05-20 DIAGNOSIS — R00.2 HEART PALPITATIONS: Primary | ICD-10-CM

## 2019-05-20 PROCEDURE — 99214 OFFICE O/P EST MOD 30 MIN: CPT | Performed by: INTERNAL MEDICINE

## 2019-05-20 RX ORDER — METOPROLOL SUCCINATE 50 MG/1
TABLET, EXTENDED RELEASE ORAL
Qty: 180 TABLET | Refills: 3 | Status: SHIPPED | OUTPATIENT
Start: 2019-05-20 | End: 2019-08-08 | Stop reason: SDUPTHER

## 2019-05-20 ASSESSMENT — ENCOUNTER SYMPTOMS
GASTROINTESTINAL NEGATIVE: 1
NAUSEA: 0
STRIDOR: 0
BLOOD IN STOOL: 0
EYES NEGATIVE: 1
WHEEZING: 0
CHEST TIGHTNESS: 0
SHORTNESS OF BREATH: 1
COUGH: 0

## 2019-08-08 DIAGNOSIS — I10 ESSENTIAL HYPERTENSION: ICD-10-CM

## 2019-08-08 RX ORDER — METOPROLOL SUCCINATE 50 MG/1
TABLET, EXTENDED RELEASE ORAL
Qty: 180 TABLET | Refills: 3 | Status: SHIPPED | OUTPATIENT
Start: 2019-08-08 | End: 2020-07-13

## 2019-08-27 ASSESSMENT — PATIENT HEALTH QUESTIONNAIRE - PHQ9
SUM OF ALL RESPONSES TO PHQ QUESTIONS 1-9: 0
SUM OF ALL RESPONSES TO PHQ QUESTIONS 1-9: 0

## 2019-08-27 ASSESSMENT — LIFESTYLE VARIABLES: HOW OFTEN DO YOU HAVE A DRINK CONTAINING ALCOHOL: 0

## 2019-09-03 ENCOUNTER — OFFICE VISIT (OUTPATIENT)
Dept: PULMONOLOGY | Age: 79
End: 2019-09-03
Payer: MEDICARE

## 2019-09-03 VITALS
HEART RATE: 72 BPM | RESPIRATION RATE: 16 BRPM | OXYGEN SATURATION: 94 % | WEIGHT: 201 LBS | TEMPERATURE: 97.7 F | BODY MASS INDEX: 36.99 KG/M2 | SYSTOLIC BLOOD PRESSURE: 114 MMHG | DIASTOLIC BLOOD PRESSURE: 62 MMHG | HEIGHT: 62 IN

## 2019-09-03 DIAGNOSIS — G47.30 SLEEP APNEA, UNSPECIFIED TYPE: Primary | ICD-10-CM

## 2019-09-03 DIAGNOSIS — E66.9 OBESITY (BMI 30-39.9): ICD-10-CM

## 2019-09-03 DIAGNOSIS — D75.1 POLYCYTHEMIA: ICD-10-CM

## 2019-09-03 PROCEDURE — 99204 OFFICE O/P NEW MOD 45 MIN: CPT | Performed by: INTERNAL MEDICINE

## 2019-09-03 ASSESSMENT — ENCOUNTER SYMPTOMS
EYE ITCHING: 0
COUGH: 0
ABDOMINAL PAIN: 0
VOMITING: 0
SHORTNESS OF BREATH: 0
VOICE CHANGE: 0
RHINORRHEA: 0
EYE DISCHARGE: 0
SORE THROAT: 0
DIARRHEA: 0
SINUS PRESSURE: 0
CHEST TIGHTNESS: 0
TROUBLE SWALLOWING: 0
NAUSEA: 0
WHEEZING: 0

## 2019-09-03 NOTE — PROGRESS NOTES
tenderness. Abdominal: She exhibits no distension. There is no tenderness. There is no rebound. Musculoskeletal: Normal range of motion. She exhibits no edema. Lymphadenopathy:     She has no cervical adenopathy. Neurological: She is alert and oriented to person, place, and time. Coordination normal.   Skin: Skin is warm and dry. She is not diaphoretic. Psychiatric: She has a normal mood and affect. Current Outpatient Medications   Medication Sig Dispense Refill    metoprolol succinate (TOPROL XL) 50 MG extended release tablet TAKE 1 TABLET BY MOUTH TWO  TIMES DAILY 180 tablet 3    pravastatin (PRAVACHOL) 40 MG tablet Take 1 tablet by mouth nightly 90 tablet 3    levothyroxine (SYNTHROID) 75 MCG tablet TAKE 1 TABLET BY MOUTH ON  SUNDAY, TUESDAY, THURSDAY  AND SATURDAY 52 tablet 3    levothyroxine (SYNTHROID) 50 MCG tablet TAKE 1 TABLET BY MOUTH ON  MONDAY, WEDNESDAY AND  FRIDAY ALTERNATING WITH THE 75MCG 39 tablet 3    celecoxib (CELEBREX) 200 MG capsule TAKE 1 CAPSULE BY MOUTH  DAILY 90 capsule 3    busPIRone (BUSPAR) 5 MG tablet Take 1 tablet by mouth 2 times daily as needed (anxiety) 60 tablet 0    calcium carbonate 600 MG TABS tablet Take 1 tablet by mouth daily      vitamin D (CHOLECALCIFEROL) 1000 UNIT TABS tablet Take 1,000 Units by mouth daily       No current facility-administered medications for this visit. Results for orders placed during the hospital encounter of 10/13/17   XR Chest Portable    Narrative EXAMINATION: XR CHEST PORTABLE    REASON FOR EXAM: chest pain     FINDINGS: Heart is mildly enlarged and there is atherosclerotic changes in the aorta. Pulmonary vasculature within normal limits. Mild increased prominence of the interstitium in the lower lung fields noted but no consolidating pneumonia or evidence of   acute pulmonary edema. Bones and soft tissues intact. Impression CARDIOMEGALY. MILD INTERSTITIAL CHANGES AT THE BASES.  NO ACUTE PROCESS IN THE LUNG FIELDS SUSPECTED. Assessment/Plan:     1. Sleep apnea, unspecified type  Patient is complaining of snoring and daytime sleepiness and tiredness in the afternoon. Patient does take daily nap also fall asleep while watching TV. Recommend to have a sleep study for further evaluation. She does not want to go to sleep lab but she will agreeable for home sleep test testing. Home sleep study is  Requested. - Home Sleep Study; Future    2. Obesity (BMI 30-39. 9)  Patient patient is advised try to lose weight. obesity related risk explained to the patient ,  Current weight:  201 lb (91.2 kg) Lbs. BMI:  Body mass index is 36.76 kg/m². Suggested weight control approaches, including dietary changes , exercise, behavioral modification. 3. Polycythemia  Patient may have hypoxia during sleep, which can worsen polycythemia. Sleep study requested      Return in about 4 weeks (around 10/1/2019) for sleep study.       Melany Bentley MD

## 2019-09-10 ENCOUNTER — OFFICE VISIT (OUTPATIENT)
Dept: FAMILY MEDICINE CLINIC | Age: 79
End: 2019-09-10
Payer: MEDICARE

## 2019-09-10 VITALS
SYSTOLIC BLOOD PRESSURE: 120 MMHG | OXYGEN SATURATION: 98 % | HEART RATE: 68 BPM | DIASTOLIC BLOOD PRESSURE: 74 MMHG | HEIGHT: 63 IN | TEMPERATURE: 97.6 F | BODY MASS INDEX: 35.37 KG/M2 | WEIGHT: 199.6 LBS

## 2019-09-10 DIAGNOSIS — D75.1 POLYCYTHEMIA: ICD-10-CM

## 2019-09-10 DIAGNOSIS — F41.1 GAD (GENERALIZED ANXIETY DISORDER): ICD-10-CM

## 2019-09-10 DIAGNOSIS — Z00.00 ROUTINE GENERAL MEDICAL EXAMINATION AT A HEALTH CARE FACILITY: ICD-10-CM

## 2019-09-10 DIAGNOSIS — Z23 INFLUENZA VACCINE NEEDED: ICD-10-CM

## 2019-09-10 DIAGNOSIS — I10 HYPERTENSION, UNSPECIFIED TYPE: Primary | ICD-10-CM

## 2019-09-10 DIAGNOSIS — L98.9 SKIN LESION: ICD-10-CM

## 2019-09-10 PROCEDURE — G0438 PPPS, INITIAL VISIT: HCPCS | Performed by: NURSE PRACTITIONER

## 2019-09-10 PROCEDURE — G0008 ADMIN INFLUENZA VIRUS VAC: HCPCS | Performed by: NURSE PRACTITIONER

## 2019-09-10 PROCEDURE — 90653 IIV ADJUVANT VACCINE IM: CPT | Performed by: NURSE PRACTITIONER

## 2019-09-10 ASSESSMENT — ENCOUNTER SYMPTOMS
COUGH: 0
SHORTNESS OF BREATH: 0

## 2019-09-10 ASSESSMENT — PATIENT HEALTH QUESTIONNAIRE - PHQ9
SUM OF ALL RESPONSES TO PHQ QUESTIONS 1-9: 0
SUM OF ALL RESPONSES TO PHQ QUESTIONS 1-9: 0

## 2019-09-10 NOTE — PROGRESS NOTES
current episode started more than 1 year ago. The problem is unchanged. The problem is controlled. Pertinent negatives include no chest pain, headaches, malaise/fatigue, palpitations, peripheral edema or shortness of breath. There are no associated agents to hypertension. Risk factors for coronary artery disease include obesity, post-menopausal state, sedentary lifestyle and dyslipidemia. Past treatments include beta blockers. The current treatment provides significant improvement. There are no compliance problems. There is no history of CAD/MI or heart failure. Saw hematology, they ruled out bone marrow issues. Has been going every other Friday for therapeutic phlebotomy. States she met her goal hct last week so will go back in 2 weeks and then has f/u on 10/14/19. States this led to a pulmonology visit who then ordered a sleep study which she is going to be doing soon. Taking buspar as needed. States she has been taking it more often d/t the recent stress of all of her appointments.      Past Medical History:   Diagnosis Date    Bladder prolapse, female, acquired     Chest pain 10/14/2017    Degenerative arthritis of cervical spine 6/29/2012    Diverticulosis     ALEJANDRE (dyspnea on exertion) 10/14/2017    ELIZABETH (generalized anxiety disorder)     Generalized osteoarthritis of multiple sites     knees and neck    Heart palpitations 11/13/2017    History of bone density study 09/2014    Hyperlipidemia 6/29/2012    Hypertension 6/29/2012    Hypothyroidism 6/29/2012    Internal hemorrhoid     Osteopenia 09/2014    Seborrheic dermatitis of scalp      Patient Active Problem List    Diagnosis Date Noted    Sleep apnea 09/03/2019    Obesity (BMI 30-39.9) 09/03/2019    Polycythemia 09/03/2019    Heart palpitations 05/20/2019    Mixed hyperlipidemia 05/20/2019    ALEJANDRE (dyspnea on exertion) 05/20/2019    Elevated hemoglobin (HCC) 05/10/2019    Generalized osteoarthritis of multiple sites     ELIZABETH

## 2019-09-25 ENCOUNTER — OFFICE VISIT (OUTPATIENT)
Dept: FAMILY MEDICINE CLINIC | Age: 79
End: 2019-09-25
Payer: MEDICARE

## 2019-09-25 VITALS
DIASTOLIC BLOOD PRESSURE: 68 MMHG | WEIGHT: 201 LBS | HEIGHT: 62 IN | HEART RATE: 66 BPM | BODY MASS INDEX: 36.99 KG/M2 | TEMPERATURE: 98 F | SYSTOLIC BLOOD PRESSURE: 124 MMHG | OXYGEN SATURATION: 98 %

## 2019-09-25 DIAGNOSIS — D49.2 ABNORMAL SKIN GROWTH: ICD-10-CM

## 2019-09-25 DIAGNOSIS — D49.2 ABNORMAL SKIN GROWTH: Primary | ICD-10-CM

## 2019-09-25 PROCEDURE — 11441 EXC FACE-MM B9+MARG 0.6-1 CM: CPT | Performed by: FAMILY MEDICINE

## 2019-09-25 NOTE — PATIENT INSTRUCTIONS
day and using petroleum jelly will help prevent a scab from forming.     · If the wound bleeds, put direct pressure on it with a clean cloth until the bleeding stops.     · If you had a growth \"frozen\" off, you may get a blister. Do not break it. Let it dry up on its own. It is common for the blister to fill with blood. You do not need to do anything about this, but if it becomes too painful, call your doctor.     · Avoid the sun until your stitches are removed. Follow-up care is a key part of your treatment and safety. Be sure to make and go to all appointments, and call your doctor if you are having problems. It's also a good idea to know your test results and keep a list of the medicines you take. When should you call for help? Call 911 anytime you think you may need emergency care. For example, call if:    · You passed out (lost consciousness).     · You have severe trouble breathing.     · You have sudden chest pain and shortness of breath, or you cough up blood.    Call your doctor now or seek immediate medical care if:    · You have symptoms of a blood clot in your leg (called a deep vein thrombosis), such as:  ? Pain in the calf, back of the knee, thigh, or groin. ? Redness and swelling in your leg or groin.     · You have signs of infection, such as:  ? Increased pain, swelling, warmth, or redness. ? Red streaks leading from the wound. ? Pus draining from the wound. ? A fever.     · You have pain that does not get better after you take pain medicine.     · You have loose stitches.    Watch closely for changes in your health, and be sure to contact your doctor if you have any problems. Where can you learn more? Go to https://[employer]iceweb.[employer]. org and sign in to your [employer] account. Enter Q228 in the Shwr[employer]m box to learn more about \"Skin Lesion Removal: What to Expect at Home. \"     If you do not have an account, please click on the \"Sign Up Now\" link.   Current as of:

## 2019-09-25 NOTE — PROGRESS NOTES
/68   Pulse 66   Temp 98 °F (36.7 °C)   Ht 5' 2\" (1.575 m) Comment: accurate without shoes  Wt 201 lb (91.2 kg)   SpO2 98%   BMI 36.76 kg/m²   Physical Exam   Constitutional: Vital signs are normal. She appears well-developed and well-nourished. Non-toxic appearance. No distress. HENT:   Head: Normocephalic and atraumatic. Right Ear: Hearing and tympanic membrane normal.   Left Ear: Hearing and tympanic membrane normal.   Nose: Nose normal. No nasal deformity. Mouth/Throat: Oropharynx is clear and moist and mucous membranes are normal.   Eyes: Pupils are equal, round, and reactive to light. Conjunctivae, EOM and lids are normal. Right eye exhibits no discharge. Left eye exhibits no discharge. Neck: Trachea normal, full passive range of motion without pain and phonation normal. No JVD present. No thyroid mass and no thyromegaly present. Cardiovascular: Normal rate and regular rhythm. Pulmonary/Chest: No accessory muscle usage. No respiratory distress. Abdominal: Normal appearance. Musculoskeletal:   FROM all large muscle groups and joints as witnessed when walking to exam table, getting on, and getting off the exam table. Neurological: She is alert. She displays no atrophy and no tremor. Gait normal.   Skin: Skin is warm, dry and intact. No rash noted. L nose near bridge and lower eyelid junction 6 mm raised cystic lesion   Psychiatric: She has a normal mood and affect. Her speech is normal and behavior is normal. Thought content normal.       CONSENT:  The procedure was discussed with the patient. All questions were answered and alternative options discussed. The patient is aware of the risks of bleeding, infection,unsatisfactory scar result. Informed consent paperwork was signed by the patient. PROCEDURE:  The lesion area was prepped with alcohol wipes and 0.2 cc of 1% Lidocaine was used for anesthesia.   A 11 blade scalpel was used to remove the lesion abnormal normal color. You may need only a bandage, or you may need stitches. If you had stitches, your doctor will probably remove them 5 to 14 days later. If you have the type of stitches that dissolve, they do not have to be removed. They will disappear on their own. This care sheet gives you a general idea about how long it will take for you to recover. But each person recovers at a different pace. Follow the steps below to get better as quickly as possible. How can you care for yourself at home? Activity    · For the first few days, try not to bump or knock your wound.     · Depending on where your wound is, you may need to avoid strenuous exercise for 2 weeks after the procedure or until your doctor says it is okay.     · If you have had a lesion removed from your face, do not use makeup near your wound until you have your stitches taken out.     · Ask your doctor when it is okay to shower, bathe, or swim. Medicines    · Your doctor will tell you if and when you can restart your medicines. He or she will also give you instructions about taking any new medicines.     · If you take blood thinners, such as warfarin (Coumadin), clopidogrel (Plavix), or aspirin, be sure to talk to your doctor. He or she will tell you if and when to start taking those medicines again. Make sure that you understand exactly what your doctor wants you to do.     · Be safe with medicines. Take pain medicines exactly as directed. ? If the doctor gave you a prescription medicine for pain, take it as prescribed. ? If you are not taking a prescription pain medicine, ask your doctor if you can take an over-the-counter medicine.    Wound care    · If your doctor told you how to care for your incision, follow your doctor's instructions. If you did not get instructions, follow this general advice:  ? Keep the wound bandaged and dry for the first day. ? After the first 24 to 48 hours, wash around the wound with clean water 2 times a day.  Don't

## 2019-09-26 ASSESSMENT — ENCOUNTER SYMPTOMS
NAUSEA: 0
WHEEZING: 0
FACIAL SWELLING: 0
DIARRHEA: 0
COUGH: 0
EYE REDNESS: 0
EYE DISCHARGE: 0

## 2019-09-27 ENCOUNTER — OFFICE VISIT (OUTPATIENT)
Dept: CARDIOLOGY CLINIC | Age: 79
End: 2019-09-27
Payer: MEDICARE

## 2019-09-27 VITALS
SYSTOLIC BLOOD PRESSURE: 132 MMHG | HEART RATE: 64 BPM | RESPIRATION RATE: 18 BRPM | WEIGHT: 200 LBS | DIASTOLIC BLOOD PRESSURE: 74 MMHG | OXYGEN SATURATION: 95 % | BODY MASS INDEX: 36.58 KG/M2

## 2019-09-27 DIAGNOSIS — I10 HYPERTENSION, UNSPECIFIED TYPE: Primary | ICD-10-CM

## 2019-09-27 DIAGNOSIS — R06.09 DOE (DYSPNEA ON EXERTION): ICD-10-CM

## 2019-09-27 DIAGNOSIS — E78.2 MIXED HYPERLIPIDEMIA: ICD-10-CM

## 2019-09-27 DIAGNOSIS — R00.2 HEART PALPITATIONS: ICD-10-CM

## 2019-09-27 PROCEDURE — 99214 OFFICE O/P EST MOD 30 MIN: CPT | Performed by: INTERNAL MEDICINE

## 2019-09-27 PROCEDURE — 93000 ELECTROCARDIOGRAM COMPLETE: CPT | Performed by: INTERNAL MEDICINE

## 2019-09-27 ASSESSMENT — ENCOUNTER SYMPTOMS
STRIDOR: 0
NAUSEA: 0
CHEST TIGHTNESS: 0
BLOOD IN STOOL: 0
EYES NEGATIVE: 1
GASTROINTESTINAL NEGATIVE: 1
WHEEZING: 0
COUGH: 0

## 2019-09-27 NOTE — PROGRESS NOTES
Component Value Date    WBC 4.8 05/10/2019    RBC 5.43 05/10/2019    HGB 17.0 05/10/2019    HCT 50.1 05/10/2019    MCV 92.3 05/10/2019    MCH 31.4 05/10/2019    MCHC 34.0 05/10/2019    RDW 13.7 05/10/2019     05/10/2019     Lipids:  Lab Results   Component Value Date    CHOL 188 09/25/2018    CHOL 171 10/14/2017    CHOL 204 (H) 02/24/2017     Lab Results   Component Value Date    TRIG 178 09/25/2018    TRIG 162 10/14/2017    TRIG 185 02/24/2017     Lab Results   Component Value Date    HDL 51 09/25/2018    HDL 50 10/14/2017    HDL 57 02/24/2017     Lab Results   Component Value Date    LDLCALC 101 09/25/2018    LDLCALC 89 10/14/2017    LDLCALC 110 02/24/2017     No results found for: LABVLDL, VLDL  Lab Results   Component Value Date    CHOLHDLRATIO 2.9 03/14/2013    CHOLHDLRATIO 2.8 10/01/2012    CHOLHDLRATIO 4.2 07/02/2012     CMP:    Lab Results   Component Value Date     05/10/2019    K 4.4 05/10/2019     05/10/2019    CO2 26 05/10/2019    BUN 16 05/10/2019    CREATININE 0.67 05/10/2019    GFRAA >60.0 05/10/2019    LABGLOM >60.0 05/10/2019    GLUCOSE 61 05/10/2019    PROT 7.3 05/10/2019    LABALBU 4.4 05/10/2019    CALCIUM 9.4 05/10/2019    BILITOT 0.8 05/10/2019    ALKPHOS 76 05/10/2019    AST 19 05/10/2019    ALT 13 05/10/2019     BMP:    Lab Results   Component Value Date     05/10/2019    K 4.4 05/10/2019     05/10/2019    CO2 26 05/10/2019    BUN 16 05/10/2019    LABALBU 4.4 05/10/2019    CREATININE 0.67 05/10/2019    CALCIUM 9.4 05/10/2019    GFRAA >60.0 05/10/2019    LABGLOM >60.0 05/10/2019    GLUCOSE 61 05/10/2019     Magnesium:    Lab Results   Component Value Date    MG 2.0 10/14/2017     TSH:  Lab Results   Component Value Date    TSH 2.770 09/25/2018       Patient Active Problem List   Diagnosis    Hypertension    Hyperlipidemia    Hypothyroidism    Degenerative arthritis of cervical spine    Osteoarthritis of knees, bilateral    Seborrheic dermatitis of scalp  Generalized osteoarthritis of multiple sites    ELIZABETH (generalized anxiety disorder)    Elevated hemoglobin (HCC)    Heart palpitations    Mixed hyperlipidemia    ALEJANDRE (dyspnea on exertion)    Sleep apnea    Obesity (BMI 30-39. 9)    Polycythemia       There are no discontinued medications. Modified Medications    No medications on file       No orders of the defined types were placed in this encounter. Assessment/Plan:    1. Essential hypertension   stable         2. Heart palpitations   stable - occasion. Much improved. 3. Mixed hyperlipidemia   statin     4. ALEJANDRE (dyspnea on exertion)  Stable. 5.  Leukocytosis- chronic- see Hematology        Counseling:  Heart Healthy Lifestyle, Low Salt Diet, Take Precautions to Prevent Falls and Walk Daily    Return in about 4 months (around 1/27/2020) for Cardiovascular care. .      Electronically signed by Carlos Alberto Heath MD on 9/27/2019 at 3:38 PM

## 2019-09-30 ENCOUNTER — OFFICE VISIT (OUTPATIENT)
Dept: FAMILY MEDICINE CLINIC | Age: 79
End: 2019-09-30

## 2019-09-30 VITALS
OXYGEN SATURATION: 95 % | BODY MASS INDEX: 36.58 KG/M2 | HEART RATE: 72 BPM | HEIGHT: 62 IN | SYSTOLIC BLOOD PRESSURE: 128 MMHG | DIASTOLIC BLOOD PRESSURE: 72 MMHG | TEMPERATURE: 97.6 F

## 2019-09-30 DIAGNOSIS — L83 ACANTHOSIS: Primary | ICD-10-CM

## 2019-09-30 ASSESSMENT — ENCOUNTER SYMPTOMS
EYE DISCHARGE: 0
EYE REDNESS: 0
FACIAL SWELLING: 0
DIARRHEA: 0
NAUSEA: 0
WHEEZING: 0
COUGH: 0

## 2019-09-30 NOTE — PROGRESS NOTES
swelling in your leg or groin.     · You have signs of infection, such as:  ? Increased pain, swelling, warmth, or redness. ? Red streaks leading from the wound. ? Pus draining from the wound. ? A fever.     · You have pain that does not get better after you take pain medicine.     · You have loose stitches.    Watch closely for changes in your health, and be sure to contact your doctor if you have any problems. Where can you learn more? Go to https://[employer].Believe.in. org and sign in to your Siri account. Enter Q228 in the [employer] box to learn more about \"Skin Lesion Removal: What to Expect at Home. \"     If you do not have an account, please click on the \"Sign Up Now\" link. Current as of: April 1, 2019  Content Version: 12.1  © 4423-2326 [employer], [employer]. Care instructions adapted under license by [facility] [place] ([facility]). If you have questions about a medical condition or this instruction, always ask your healthcare professional. [address] any warranty or liability for your use of this information. Burke Gross M.D.

## 2019-10-01 ENCOUNTER — HOSPITAL ENCOUNTER (OUTPATIENT)
Dept: SLEEP CENTER | Age: 79
Discharge: HOME | End: 2019-10-03
Payer: MEDICARE

## 2019-10-01 PROCEDURE — G0399 HOME SLEEP TEST/TYPE 3 PORTA: HCPCS

## 2019-10-01 PROCEDURE — 95806 SLEEP STUDY UNATT&RESP EFFT: CPT | Performed by: INTERNAL MEDICINE

## 2019-10-17 PROBLEM — G47.33 OSA (OBSTRUCTIVE SLEEP APNEA): Status: ACTIVE | Noted: 2019-09-03

## 2019-10-21 DIAGNOSIS — G47.30 SLEEP APNEA, UNSPECIFIED TYPE: ICD-10-CM

## 2019-10-29 ENCOUNTER — OFFICE VISIT (OUTPATIENT)
Dept: PULMONOLOGY | Age: 79
End: 2019-10-29
Payer: MEDICARE

## 2019-10-29 VITALS
TEMPERATURE: 97.1 F | HEART RATE: 75 BPM | BODY MASS INDEX: 37.36 KG/M2 | OXYGEN SATURATION: 96 % | RESPIRATION RATE: 16 BRPM | HEIGHT: 62 IN | DIASTOLIC BLOOD PRESSURE: 76 MMHG | WEIGHT: 203 LBS | SYSTOLIC BLOOD PRESSURE: 124 MMHG

## 2019-10-29 DIAGNOSIS — G47.30 SLEEP APNEA, UNSPECIFIED TYPE: Primary | ICD-10-CM

## 2019-10-29 DIAGNOSIS — D75.1 POLYCYTHEMIA: ICD-10-CM

## 2019-10-29 DIAGNOSIS — E66.9 OBESITY (BMI 30-39.9): ICD-10-CM

## 2019-10-29 PROCEDURE — 99214 OFFICE O/P EST MOD 30 MIN: CPT | Performed by: INTERNAL MEDICINE

## 2019-10-29 ASSESSMENT — ENCOUNTER SYMPTOMS
NAUSEA: 0
SINUS PRESSURE: 0
TROUBLE SWALLOWING: 0
EYE DISCHARGE: 0
SHORTNESS OF BREATH: 0
VOMITING: 0
COUGH: 0
ABDOMINAL PAIN: 0
DIARRHEA: 0
CHEST TIGHTNESS: 0
SORE THROAT: 0
VOICE CHANGE: 0
RHINORRHEA: 0
WHEEZING: 0
EYE ITCHING: 0

## 2020-01-13 ENCOUNTER — OFFICE VISIT (OUTPATIENT)
Dept: PULMONOLOGY | Age: 80
End: 2020-01-13
Payer: MEDICARE

## 2020-01-13 VITALS
SYSTOLIC BLOOD PRESSURE: 114 MMHG | RESPIRATION RATE: 16 BRPM | BODY MASS INDEX: 37.54 KG/M2 | HEART RATE: 74 BPM | OXYGEN SATURATION: 92 % | HEIGHT: 62 IN | WEIGHT: 204 LBS | DIASTOLIC BLOOD PRESSURE: 64 MMHG

## 2020-01-13 PROCEDURE — 99214 OFFICE O/P EST MOD 30 MIN: CPT | Performed by: INTERNAL MEDICINE

## 2020-01-13 ASSESSMENT — ENCOUNTER SYMPTOMS
WHEEZING: 0
SHORTNESS OF BREATH: 0
SINUS PRESSURE: 0
VOICE CHANGE: 0
CHEST TIGHTNESS: 0
SORE THROAT: 0
VOMITING: 0
RHINORRHEA: 0
TROUBLE SWALLOWING: 0
ABDOMINAL PAIN: 0
NAUSEA: 0
DIARRHEA: 0
EYE DISCHARGE: 0
COUGH: 0
EYE ITCHING: 0

## 2020-01-28 ENCOUNTER — OFFICE VISIT (OUTPATIENT)
Dept: FAMILY MEDICINE CLINIC | Age: 80
End: 2020-01-28
Payer: MEDICARE

## 2020-01-28 VITALS
TEMPERATURE: 97.2 F | HEART RATE: 61 BPM | WEIGHT: 204 LBS | OXYGEN SATURATION: 95 % | DIASTOLIC BLOOD PRESSURE: 86 MMHG | SYSTOLIC BLOOD PRESSURE: 124 MMHG | HEIGHT: 62 IN | BODY MASS INDEX: 37.54 KG/M2

## 2020-01-28 PROCEDURE — 99214 OFFICE O/P EST MOD 30 MIN: CPT | Performed by: NURSE PRACTITIONER

## 2020-01-28 RX ORDER — CELECOXIB 200 MG/1
CAPSULE ORAL
Qty: 90 CAPSULE | Refills: 3 | Status: SHIPPED | OUTPATIENT
Start: 2020-01-28 | End: 2020-12-15

## 2020-01-28 RX ORDER — LEVOTHYROXINE SODIUM 0.07 MG/1
TABLET ORAL
Qty: 52 TABLET | Refills: 3 | Status: SHIPPED | OUTPATIENT
Start: 2020-01-28 | End: 2021-02-04 | Stop reason: SDUPTHER

## 2020-01-28 RX ORDER — BUSPIRONE HYDROCHLORIDE 5 MG/1
5 TABLET ORAL 2 TIMES DAILY PRN
Qty: 60 TABLET | Refills: 0 | Status: SHIPPED | OUTPATIENT
Start: 2020-01-28 | End: 2021-09-14 | Stop reason: SDUPTHER

## 2020-01-28 RX ORDER — LEVOTHYROXINE SODIUM 0.05 MG/1
TABLET ORAL
Qty: 39 TABLET | Refills: 3 | Status: SHIPPED | OUTPATIENT
Start: 2020-01-28 | End: 2021-02-04 | Stop reason: SDUPTHER

## 2020-01-28 ASSESSMENT — ENCOUNTER SYMPTOMS
ABDOMINAL PAIN: 0
SHORTNESS OF BREATH: 0
COUGH: 0

## 2020-01-28 ASSESSMENT — PATIENT HEALTH QUESTIONNAIRE - PHQ9
SUM OF ALL RESPONSES TO PHQ QUESTIONS 1-9: 0
2. FEELING DOWN, DEPRESSED OR HOPELESS: 0
SUM OF ALL RESPONSES TO PHQ QUESTIONS 1-9: 0
SUM OF ALL RESPONSES TO PHQ9 QUESTIONS 1 & 2: 0
1. LITTLE INTEREST OR PLEASURE IN DOING THINGS: 0

## 2020-01-28 NOTE — PROGRESS NOTES
Subjective  Chief Complaint   Patient presents with    Check-Up     4 month check up HTN       Hypertension   This is a chronic problem. The current episode started more than 1 year ago. The problem is unchanged. The problem is controlled. Pertinent negatives include no chest pain, malaise/fatigue, palpitations, peripheral edema or shortness of breath. There are no associated agents to hypertension. Risk factors for coronary artery disease include obesity, sedentary lifestyle, post-menopausal state and dyslipidemia. Past treatments include beta blockers. The current treatment provides significant improvement. There are no compliance problems. Hyperlipidemia   This is a chronic problem. The current episode started more than 1 year ago. The problem is controlled. Recent lipid tests were reviewed and are normal. Exacerbating diseases include hypothyroidism and obesity. She has no history of diabetes, liver disease or nephrotic syndrome. Factors aggravating her hyperlipidemia include beta blockers. Associated symptoms include leg pain. Pertinent negatives include no chest pain, focal sensory loss, focal weakness, myalgias or shortness of breath. Current antihyperlipidemic treatment includes statins. The current treatment provides significant improvement of lipids. There are no compliance problems. Risk factors for coronary artery disease include dyslipidemia, hypertension, a sedentary lifestyle, post-menopausal and obesity. Pt did have sleep study done which showed sleep apnea. She is following with pulmonology for this now and is using cpap nightly for this. She is continuing to follow with Dr. J Carlos Carson for polycythemia. Has not had to get phlebotomy the last 2 times, but her numbers have been trending upwards so she may have to get it the next time. She is now going monthly, down from every 2 weeks. Saw Dr. Oscar Cancino in September. No changes were made. Sees him again in February.      Saw Dr. Ferreira  recently and had cortisone shot in right knee. Takes buspar PRN. Can't remember the last time she took them.      Past Medical History:   Diagnosis Date    Bladder prolapse, female, acquired     Chest pain 10/14/2017    Degenerative arthritis of cervical spine 6/29/2012    Diverticulosis     ALEJANDRE (dyspnea on exertion) 10/14/2017    ELIZABETH (generalized anxiety disorder)     Generalized osteoarthritis of multiple sites     knees and neck    Heart palpitations 11/13/2017    History of bone density study 09/2014    Hyperlipidemia 6/29/2012    Hypertension 6/29/2012    Hypothyroidism 6/29/2012    Internal hemorrhoid     Osteopenia 09/2014    Seborrheic dermatitis of scalp      Patient Active Problem List    Diagnosis Date Noted    Sleep apnea 09/03/2019    Obesity (BMI 30-39.9) 09/03/2019    Polycythemia 09/03/2019    Heart palpitations 05/20/2019    Mixed hyperlipidemia 05/20/2019    ALEJANDRE (dyspnea on exertion) 05/20/2019    Elevated hemoglobin (HCC) 05/10/2019    Generalized osteoarthritis of multiple sites     ELIZABETH (generalized anxiety disorder)     Seborrheic dermatitis of scalp     Osteoarthritis of knees, bilateral     Hypertension 06/29/2012    Hyperlipidemia 06/29/2012    Hypothyroidism 06/29/2012    Degenerative arthritis of cervical spine 06/29/2012     Past Surgical History:   Procedure Laterality Date    ARTHROSCOPY / ARTHROTOMY KNEE Right 10/14/2016    RIGHT KNEE ARTHROSCOPY / LOOSE BODY / PAT ON ADMIT performed by Gwyn Kincaid [facility] at [facility]  11/2015    CARPAL TUNNEL RELEASE Right     CATARACT REMOVAL WITH IMPLANT Bilateral     COLONOSCOPY  12/15/05    CYSTOCELE REPAIR      HYSTERECTOMY      RECTOCELE REPAIR       Family History   Problem Relation Age of Onset    Cancer Mother         Liposarcoma    Heart Disease Father         Heart failure, Myelodysplasia    Cancer Brother         Kidney cancer    Diabetes type 2  Maternal Grandmother     Heart Rate and Rhythm: Normal rate and regular rhythm. Pulses: Normal pulses. Heart sounds: Normal heart sounds. Pulmonary:      Effort: Pulmonary effort is normal. No respiratory distress. Breath sounds: Normal breath sounds. No stridor. No wheezing, rhonchi or rales. Chest:      Chest wall: No tenderness. Musculoskeletal: Normal range of motion. Right lower leg: No edema. Left lower leg: No edema. Skin:     General: Skin is warm and dry. Capillary Refill: Capillary refill takes less than 2 seconds. Coloration: Skin is not jaundiced or pale. Findings: No bruising, erythema, lesion or rash. Neurological:      General: No focal deficit present. Mental Status: She is alert and oriented to person, place, and time. Mental status is at baseline. Psychiatric:         Mood and Affect: Mood normal.         Behavior: Behavior normal.         Thought Content: Thought content normal.         Judgment: Judgment normal.         Assessment& Plan     Diagnosis Orders   1. Polycythemia     2. Osteoarthritis of cervical spine, unspecified spinal osteoarthritis complication status  celecoxib (CELEBREX) 200 MG capsule   3. Acquired hypothyroidism  levothyroxine (SYNTHROID) 50 MCG tablet    levothyroxine (SYNTHROID) 75 MCG tablet    TSH with Reflex   4. Hypertension, unspecified type  Comprehensive Metabolic Panel   5. Mixed hyperlipidemia  Lipid Panel   6. Chronic pain of right knee     7. ELIZABETH (generalized anxiety disorder)  busPIRone (BUSPAR) 5 MG tablet     Continue to follow with all specialists. Check labs as ordered. Continue current regimen. F/u in 4 months or sooner PRN. Side effects, adverse effects of the medication prescribed today, as well as treatment plan/ rationale and result expectations have been discussed with the patient who expresses understanding and desires to proceed. Close follow up to evaluate treatment results and for coordination of care.   I have reviewed the patient's medical history in detail and updated the computerized patient record. As always, patient is advised that if symptoms worsen in any way they will proceed to the nearest emergency room. Orders Placed This Encounter   Procedures    Lipid Panel     Standing Status:   Future     Standing Expiration Date:   1/28/2021     Order Specific Question:   Is Patient Fasting?/# of Hours     Answer:   y/12    TSH with Reflex     Standing Status:   Future     Standing Expiration Date:   1/28/2021    Comprehensive Metabolic Panel     Standing Status:   Future     Standing Expiration Date:   1/28/2021       Orders Placed This Encounter   Medications    celecoxib (CELEBREX) 200 MG capsule     Sig: TAKE 1 CAPSULE BY MOUTH  DAILY     Dispense:  90 capsule     Refill:  3    levothyroxine (SYNTHROID) 50 MCG tablet     Sig: TAKE 1 TABLET BY MOUTH ON  MONDAY, WEDNESDAY AND  FRIDAY ALTERNATING WITH THE 75MCG     Dispense:  39 tablet     Refill:  3    levothyroxine (SYNTHROID) 75 MCG tablet     Sig: TAKE 1 TABLET BY MOUTH ON  SUNDAY, TUESDAY, THURSDAY  AND SATURDAY     Dispense:  52 tablet     Refill:  3    busPIRone (BUSPAR) 5 MG tablet     Sig: Take 1 tablet by mouth 2 times daily as needed (anxiety)     Dispense:  60 tablet     Refill:  0       Return in about 4 months (around 5/28/2020) for check up.     LYNDA Moncada - CNP

## 2020-01-30 DIAGNOSIS — E78.2 MIXED HYPERLIPIDEMIA: ICD-10-CM

## 2020-01-30 DIAGNOSIS — E03.9 ACQUIRED HYPOTHYROIDISM: ICD-10-CM

## 2020-01-30 DIAGNOSIS — I10 HYPERTENSION, UNSPECIFIED TYPE: ICD-10-CM

## 2020-01-30 LAB
ALBUMIN SERPL-MCNC: 4.1 G/DL (ref 3.5–4.6)
ALP BLD-CCNC: 78 U/L (ref 40–130)
ALT SERPL-CCNC: 11 U/L (ref 0–33)
ANION GAP SERPL CALCULATED.3IONS-SCNC: 13 MEQ/L (ref 9–15)
AST SERPL-CCNC: 18 U/L (ref 0–35)
BILIRUB SERPL-MCNC: 0.6 MG/DL (ref 0.2–0.7)
BUN BLDV-MCNC: 21 MG/DL (ref 8–23)
CALCIUM SERPL-MCNC: 9.3 MG/DL (ref 8.5–9.9)
CHLORIDE BLD-SCNC: 101 MEQ/L (ref 95–107)
CHOLESTEROL, TOTAL: 187 MG/DL (ref 0–199)
CO2: 26 MEQ/L (ref 20–31)
CREAT SERPL-MCNC: 0.7 MG/DL (ref 0.5–0.9)
GFR AFRICAN AMERICAN: >60
GFR NON-AFRICAN AMERICAN: >60
GLOBULIN: 2.8 G/DL (ref 2.3–3.5)
GLUCOSE BLD-MCNC: 61 MG/DL (ref 70–99)
HDLC SERPL-MCNC: 50 MG/DL (ref 40–59)
LDL CHOLESTEROL CALCULATED: 92 MG/DL (ref 0–129)
POTASSIUM SERPL-SCNC: 4.2 MEQ/L (ref 3.4–4.9)
SODIUM BLD-SCNC: 140 MEQ/L (ref 135–144)
TOTAL PROTEIN: 6.9 G/DL (ref 6.3–8)
TRIGL SERPL-MCNC: 223 MG/DL (ref 0–150)
TSH REFLEX: 2.18 UIU/ML (ref 0.44–3.86)

## 2020-02-11 RX ORDER — PRAVASTATIN SODIUM 40 MG
40 TABLET ORAL NIGHTLY
Qty: 90 TABLET | Refills: 3 | Status: SHIPPED | OUTPATIENT
Start: 2020-02-11 | End: 2021-03-29

## 2020-03-06 ENCOUNTER — OFFICE VISIT (OUTPATIENT)
Dept: CARDIOLOGY CLINIC | Age: 80
End: 2020-03-06
Payer: MEDICARE

## 2020-03-06 VITALS
WEIGHT: 204.4 LBS | BODY MASS INDEX: 37.39 KG/M2 | DIASTOLIC BLOOD PRESSURE: 75 MMHG | RESPIRATION RATE: 16 BRPM | SYSTOLIC BLOOD PRESSURE: 133 MMHG | OXYGEN SATURATION: 98 % | HEART RATE: 70 BPM

## 2020-03-06 PROCEDURE — 99214 OFFICE O/P EST MOD 30 MIN: CPT | Performed by: INTERNAL MEDICINE

## 2020-03-06 ASSESSMENT — ENCOUNTER SYMPTOMS
EYES NEGATIVE: 1
GASTROINTESTINAL NEGATIVE: 1
COUGH: 0
BLOOD IN STOOL: 0
STRIDOR: 0
WHEEZING: 0
NAUSEA: 0
CHEST TIGHTNESS: 0

## 2020-03-06 NOTE — PROGRESS NOTES
normal. There is no abdominal tenderness. Musculoskeletal: Normal range of motion. General: No tenderness or edema. Neurological: She is alert and oriented to person, place, and time. Skin: Skin is warm. No cyanosis. Nails show no clubbing.        LABS:  CBC:   Lab Results   Component Value Date    WBC 4.8 05/10/2019    RBC 5.43 05/10/2019    HGB 17.0 05/10/2019    HCT 50.1 05/10/2019    MCV 92.3 05/10/2019    MCH 31.4 05/10/2019    MCHC 34.0 05/10/2019    RDW 13.7 05/10/2019     05/10/2019     Lipids:  Lab Results   Component Value Date    CHOL 187 01/30/2020    CHOL 188 09/25/2018    CHOL 171 10/14/2017     Lab Results   Component Value Date    TRIG 223 (H) 01/30/2020    TRIG 178 09/25/2018    TRIG 162 10/14/2017     Lab Results   Component Value Date    HDL 50 01/30/2020    HDL 51 09/25/2018    HDL 50 10/14/2017     Lab Results   Component Value Date    LDLCALC 92 01/30/2020    LDLCALC 101 09/25/2018    LDLCALC 89 10/14/2017     No results found for: LABVLDL, VLDL  Lab Results   Component Value Date    CHOLHDLRATIO 2.9 03/14/2013    CHOLHDLRATIO 2.8 10/01/2012    CHOLHDLRATIO 4.2 07/02/2012     CMP:    Lab Results   Component Value Date     01/30/2020    K 4.2 01/30/2020     01/30/2020    CO2 26 01/30/2020    BUN 21 01/30/2020    CREATININE 0.70 01/30/2020    GFRAA >60.0 01/30/2020    LABGLOM >60.0 01/30/2020    GLUCOSE 61 01/30/2020    PROT 6.9 01/30/2020    LABALBU 4.1 01/30/2020    CALCIUM 9.3 01/30/2020    BILITOT 0.6 01/30/2020    ALKPHOS 78 01/30/2020    AST 18 01/30/2020    ALT 11 01/30/2020     BMP:    Lab Results   Component Value Date     01/30/2020    K 4.2 01/30/2020     01/30/2020    CO2 26 01/30/2020    BUN 21 01/30/2020    LABALBU 4.1 01/30/2020    CREATININE 0.70 01/30/2020    CALCIUM 9.3 01/30/2020    GFRAA >60.0 01/30/2020    LABGLOM >60.0 01/30/2020    GLUCOSE 61 01/30/2020     Magnesium:    Lab Results   Component Value Date    MG 2.0 10/14/2017

## 2020-05-18 ENCOUNTER — OFFICE VISIT (OUTPATIENT)
Dept: FAMILY MEDICINE CLINIC | Age: 80
End: 2020-05-18
Payer: MEDICARE

## 2020-05-18 VITALS
OXYGEN SATURATION: 98 % | DIASTOLIC BLOOD PRESSURE: 72 MMHG | TEMPERATURE: 98 F | SYSTOLIC BLOOD PRESSURE: 134 MMHG | BODY MASS INDEX: 37.54 KG/M2 | HEIGHT: 62 IN | HEART RATE: 75 BPM | WEIGHT: 204 LBS

## 2020-05-18 PROCEDURE — 99213 OFFICE O/P EST LOW 20 MIN: CPT | Performed by: NURSE PRACTITIONER

## 2020-05-18 RX ORDER — NEOMYCIN SULFATE, POLYMYXIN B SULFATE, AND DEXAMETHASONE 3.5; 10000; 1 MG/G; [USP'U]/G; MG/G
OINTMENT OPHTHALMIC 3 TIMES DAILY
Qty: 1 TUBE | Refills: 0 | Status: SHIPPED | OUTPATIENT
Start: 2020-05-18 | End: 2020-06-02 | Stop reason: ALTCHOICE

## 2020-05-18 ASSESSMENT — ENCOUNTER SYMPTOMS
EYE DISCHARGE: 1
EYE PAIN: 1
PHOTOPHOBIA: 0

## 2020-05-18 NOTE — PROGRESS NOTES
Subjective  Anisha Nuno, [de-identified] y.o. female presents today with:  Chief Complaint   Patient presents with    Eye Pain     Right eye pain, feels like something is stuck in it. x1 week Pt has been using stye eye relief, states it helps sometimes. Pt states now she feels nervous and jittery, thinks its from the medication. HPI   Patient is here for right eye pain. Feels like there is something in there for the past week. Has been using otc homeopathic stye relief, which sometimes helps. Wonders if the medication sometimes makes her feel nervous and jittery. Has had cataract surgery a few years ago. Review of Systems   Eyes: Positive for pain and discharge. Negative for photophobia. Foreign body sensation/gritty feeling       Objective    Vitals:    05/18/20 1341   BP: 134/72   Pulse: 75   Temp: 98 °F (36.7 °C)   SpO2: 98%   Weight: 204 lb (92.5 kg)   Height: 5' 2\" (1.575 m)       Physical Exam  Vitals signs reviewed. Constitutional:       General: She is not in acute distress. Appearance: She is well-developed. She is not diaphoretic. HENT:      Head: Normocephalic and atraumatic. Right Ear: External ear normal.      Left Ear: External ear normal.   Eyes:      General:         Right eye: Hordeolum present. No foreign body or discharge. Neck:      Musculoskeletal: Normal range of motion and neck supple. Pulmonary:      Effort: Pulmonary effort is normal. No respiratory distress. Skin:     General: Skin is warm and dry. Neurological:      Mental Status: She is alert. Psychiatric:         Behavior: Behavior normal.       POC Testing Today:No results found for this visit on 05/18/20. Assessment & Plan    Diagnosis Orders   1. Hordeolum externum of right upper eyelid  neomycin-polymyxin-dexameth 3.5-15624-5.1 OINT       No orders of the defined types were placed in this encounter. Follow up with eye doctor if symptoms do not improve for further evaluation.    Can also for as long as instructed, even if your eye starts to feel better. · To put in eyedrops or ointment:  ? Tilt your head back, and pull your lower eyelid down with one finger. ? Drop or squirt the medicine inside the lower lid. ? Close your eye for 30 to 60 seconds to let the drops or ointment move around. ? Do not touch the ointment or dropper tip to your eyelashes or any other surface. · Do not wear eye makeup or contact lenses until the stye or chalazion heals. · Do not share towels, pillows, or washcloths while you have a stye. When should you call for help? Call your doctor now or seek immediate medical care if:    · You have pain in your eye.     · You have a change in vision or loss of vision.     · Redness and swelling get much worse.    Watch closely for changes in your health, and be sure to contact your doctor if:    · Your stye does not get better in 1 week.     · Your chalazion does not start to get better after several weeks. Where can you learn more? Go to https://[email or web address] org and sign in to your [employer] account. Enter R359 in the [facility] box to learn more about \"Styes and Chalazia: Care Instructions. \"     If you do not have an account, please click on the \"Sign Up Now\" link. Current as of: December 17, 2019Content Version: 12.4  © 2948-8413 Healthwise, Incorporated. Care instructions adapted under license by [facility] [place] ([facility]). If you have questions about a medical condition or this instruction, always ask your healthcare professional. [place] [address] any warranty or liability for your use of this information. Return if symptoms worsen or fail to improve, for follow up with your PCP. Side effects and adverse effects of any medication prescribed today, as well as treatment plan/rationale, follow-up care, and result expectations have been discussed with the patient.   Expresses understanding and desires to proceed with treatment plan. Discussed signs and symptoms which require immediate follow-up in ED/call to 911. Understanding verbalized. I have reviewed and updated the electronic medical record.     Lori Valverde, APRN - CNP

## 2020-05-18 NOTE — PATIENT INSTRUCTIONS
ointment:  ? Tilt your head back, and pull your lower eyelid down with one finger. ? Drop or squirt the medicine inside the lower lid. ? Close your eye for 30 to 60 seconds to let the drops or ointment move around. ? Do not touch the ointment or dropper tip to your eyelashes or any other surface. · Do not wear eye makeup or contact lenses until the stye or chalazion heals. · Do not share towels, pillows, or washcloths while you have a stye. When should you call for help? Call your doctor now or seek immediate medical care if:    · You have pain in your eye.     · You have a change in vision or loss of vision.     · Redness and swelling get much worse.    Watch closely for changes in your health, and be sure to contact your doctor if:    · Your stye does not get better in 1 week.     · Your chalazion does not start to get better after several weeks. Where can you learn more? Go to [email or web address]://[email or web address] org and sign in to your [employer] account. Enter U680 in the [employer] box to learn more about \"Styes and Chalazia: Care Instructions. \"     If you do not have an account, please click on the \"Sign Up Now\" link. Current as of: December 17, 2019Content Version: 12.4  © 8160-1901 Healthwise, Incorporated. Care instructions adapted under license by [facility] ([facility]). If you have questions about a medical condition or this instruction, always ask your healthcare professional. [place] [address] any warranty or liability for your use of this information.

## 2020-05-28 ENCOUNTER — OFFICE VISIT (OUTPATIENT)
Dept: FAMILY MEDICINE CLINIC | Age: 80
End: 2020-05-28
Payer: MEDICARE

## 2020-05-28 VITALS
HEIGHT: 62 IN | OXYGEN SATURATION: 98 % | BODY MASS INDEX: 37.54 KG/M2 | WEIGHT: 204 LBS | SYSTOLIC BLOOD PRESSURE: 134 MMHG | TEMPERATURE: 98 F | DIASTOLIC BLOOD PRESSURE: 76 MMHG | HEART RATE: 65 BPM

## 2020-05-28 PROCEDURE — 99214 OFFICE O/P EST MOD 30 MIN: CPT | Performed by: NURSE PRACTITIONER

## 2020-05-28 RX ORDER — PREDNISONE 20 MG/1
TABLET ORAL
Qty: 18 TABLET | Refills: 0 | Status: SHIPPED | OUTPATIENT
Start: 2020-05-28 | End: 2020-06-06

## 2020-05-28 ASSESSMENT — ENCOUNTER SYMPTOMS
WHEEZING: 0
TROUBLE SWALLOWING: 0
NAUSEA: 0
ABDOMINAL PAIN: 0
VOMITING: 0
RHINORRHEA: 0
COUGH: 0
BACK PAIN: 1
SORE THROAT: 0
SHORTNESS OF BREATH: 0

## 2020-05-28 ASSESSMENT — VISUAL ACUITY: OU: 1

## 2020-05-28 NOTE — PROGRESS NOTES
burning pain, almost like a onel horse   It happens in the lower extremities bilaterally but more pronounced in the left leg  No pain when she is sitting   It is making walking and performing ADLs difficult  She feels the legs are a little swollen  Unsure if these symptoms are related to osteoarthritis  She has arthritis all over her body  Needs a left knee replacement  Has had some numbness/tingling on the left side of her leg since her vaginal reconstruction surgery  This pain is similar but much more pronounced and widespread  She is walking with a walker today which she normally does not do    Past Medical History:   Diagnosis Date    Bladder prolapse, female, acquired     Chest pain 10/14/2017    Degenerative arthritis of cervical spine 6/29/2012    Diverticulosis     ALEJANDRE (dyspnea on exertion) 10/14/2017    ELIZABETH (generalized anxiety disorder)     Generalized osteoarthritis of multiple sites     knees and neck    Heart palpitations 11/13/2017    History of bone density study 09/2014    Hyperlipidemia 6/29/2012    Hypertension 6/29/2012    Hypothyroidism 6/29/2012    Internal hemorrhoid     Osteopenia 09/2014    Seborrheic dermatitis of scalp      Patient Active Problem List    Diagnosis Date Noted    Sleep apnea 09/03/2019    Obesity (BMI 30-39.9) 09/03/2019    Polycythemia 09/03/2019    Heart palpitations 05/20/2019    Mixed hyperlipidemia 05/20/2019    ALEJANDRE (dyspnea on exertion) 05/20/2019    Elevated hemoglobin (HCC) 05/10/2019    Generalized osteoarthritis of multiple sites     ELIZABETH (generalized anxiety disorder)     Seborrheic dermatitis of scalp     Osteoarthritis of knees, bilateral     Hypertension 06/29/2012    Hyperlipidemia 06/29/2012    Hypothyroidism 06/29/2012    Degenerative arthritis of cervical spine 06/29/2012     Past Surgical History:   Procedure Laterality Date    ARTHROSCOPY / ARTHROTOMY KNEE Right 10/14/2016    RIGHT KNEE ARTHROSCOPY / LOOSE BODY / PAT ON ADMIT performed by Pernell Vieira MD at [address]  2015    CARPAL TUNNEL RELEASE Right     CATARACT REMOVAL WITH IMPLANT Bilateral     COLONOSCOPY  12/15/05    CYSTOCELE REPAIR      HYSTERECTOMY      RECTOCELE REPAIR       Social History     Socioeconomic History    Marital status:      Spouse name: None    Number of children: 2    Years of education: None    Highest education level: None   Occupational History    Occupation:    Social Needs    Financial resource strain: None    Food insecurity     Worry: None     Inability: None    Transportation needs     Medical: None     Non-medical: None   Tobacco Use    Smoking status: Former Smoker     Packs/day: 0.50     Years: 15.00     Pack years: 7.50     Types: Cigarettes     Last attempt to quit: 1972     Years since quittin.7    Smokeless tobacco: Never Used   Substance and Sexual Activity    Alcohol use: No    Drug use: No    Sexual activity: None   Lifestyle    Physical activity     Days per week: None     Minutes per session: None    Stress: None   Relationships    Social connections     Talks on phone: None     Gets together: None     Attends [employer] service: None     Active member of club or organization: None     Attends meetings of clubs or organizations: None     Relationship status: None    Intimate partner violence     Fear of current or ex partner: None     Emotionally abused: None     Physically abused: None     Forced sexual activity: None   Other Topics Concern    None   Social History Narrative    Has one son and one daughter.  is a few years older and now has dementia and is on aricept.       Current Outpatient Medications on File Prior to Visit   Medication Sig Dispense Refill    neomycin-polymyxin-dexameth 3.5-02367-9.1 OINT Place into both eyes 3 times daily 1 Tube 0    pravastatin (PRAVACHOL) 40 MG tablet Take 1 tablet by mouth nightly 90 tablet 3    celecoxib Eyes:      General: Lids are normal. Vision grossly intact. Extraocular Movements: Extraocular movements intact. Conjunctiva/sclera: Conjunctivae normal.      Pupils: Pupils are equal, round, and reactive to light. Cardiovascular:      Rate and Rhythm: Normal rate and regular rhythm. Heart sounds: Normal heart sounds. Pulmonary:      Effort: Pulmonary effort is normal.      Breath sounds: Normal breath sounds and air entry. Musculoskeletal:      Right knee: She exhibits decreased range of motion. She exhibits no swelling, no effusion, no deformity, no laceration and no erythema. No tenderness found. Left knee: She exhibits decreased range of motion. She exhibits no swelling, no effusion, no deformity and no erythema. No tenderness found. Right ankle: She exhibits normal range of motion, no swelling, no deformity, no laceration and normal pulse. No tenderness. Left ankle: She exhibits normal range of motion, no swelling, no deformity, no laceration and normal pulse. No tenderness. Neurological:      Mental Status: She is alert. Assessment & Plan:     Perfecto Gilliam was seen today for foot pain and leg pain. Diagnoses and all orders for this visit:    Generalized osteoarthritis of multiple sites  -     predniSONE (DELTASONE) 20 MG tablet; Take 3 tablets by mouth daily for 3 days, THEN 2 tablets daily for 3 days, THEN 1 tablet daily for 3 days. Side effects, adverse effects of the medication prescribed today, as well as treatment plan/ rationale and result expectations have been discussed with the patient who expresses understanding and desires to proceed. Close follow up to evaluate treatment results and for coordination of care. I have reviewed the patient's medical history in detail and updated the computerized patient record. As always, patient is advised that if symptoms worsen in any way they will proceed to the nearest emergency room.      Follow up in 48-72

## 2020-06-02 ENCOUNTER — OFFICE VISIT (OUTPATIENT)
Dept: FAMILY MEDICINE CLINIC | Age: 80
End: 2020-06-02
Payer: MEDICARE

## 2020-06-02 VITALS
HEART RATE: 65 BPM | SYSTOLIC BLOOD PRESSURE: 130 MMHG | TEMPERATURE: 98.1 F | BODY MASS INDEX: 37.73 KG/M2 | DIASTOLIC BLOOD PRESSURE: 74 MMHG | WEIGHT: 205 LBS | OXYGEN SATURATION: 98 % | HEIGHT: 62 IN

## 2020-06-02 DIAGNOSIS — M79.671 RIGHT FOOT PAIN: ICD-10-CM

## 2020-06-02 LAB — URIC ACID, SERUM: 4.7 MG/DL (ref 2.4–5.7)

## 2020-06-02 PROCEDURE — 99214 OFFICE O/P EST MOD 30 MIN: CPT | Performed by: NURSE PRACTITIONER

## 2020-06-02 ASSESSMENT — ENCOUNTER SYMPTOMS
COLOR CHANGE: 0
PHOTOPHOBIA: 0
BACK PAIN: 0
CHEST TIGHTNESS: 0
SHORTNESS OF BREATH: 0
COUGH: 0

## 2020-06-02 NOTE — PROGRESS NOTES
Subjective  Chief Complaint   Patient presents with    Check-Up     HTN, hyperlipidemia, anxiety       Hypertension   This is a chronic problem. The current episode started more than 1 year ago. The problem is unchanged. The problem is controlled. Pertinent negatives include no chest pain, headaches, malaise/fatigue, palpitations, peripheral edema or shortness of breath. Agents associated with hypertension include steroids and thyroid hormones. Risk factors for coronary artery disease include obesity. Past treatments include beta blockers. The current treatment provides significant improvement. There are no compliance problems. There is no history of CAD/MI, heart failure or PVD. Pt here for 6 month check up. Main concern is pain in both of her feet. Saw [facility] last week who gave her steroids. Her left foot and other pains improved; however she is still having significant pain in her right foot. She does have a \"bad knee\" on that leg as well. Pain has been very bad for the last week. Having difficulty walking or performing adls. Describes it as burning and cramping pain. Any pressure on the area causes the pain. No pain while resting, only when up walking. She is continuing to f/w Dr. Cielo Treviño for polycythemia vera. She is continuing to require phlebotomies monthly to every other month. Her blood counts are going up even with the phlebotomies.          Past Medical History:   Diagnosis Date    Bladder prolapse, female, acquired     Chest pain 10/14/2017    Degenerative arthritis of cervical spine 6/29/2012    Diverticulosis     ALEJANDRE (dyspnea on exertion) 10/14/2017    ELIZABETH (generalized anxiety disorder)     Generalized osteoarthritis of multiple sites     knees and neck    Heart palpitations 11/13/2017    History of bone density study 09/2014    Hyperlipidemia 6/29/2012    Hypertension 6/29/2012    Hypothyroidism 6/29/2012    Internal hemorrhoid     Osteopenia 09/2014    Seborrheic Last attempt to quit: 1972     Years since quittin.7    Smokeless tobacco: Never Used   Substance and Sexual Activity    Alcohol use: No    Drug use: No    Sexual activity: None   Lifestyle    Physical activity     Days per week: None     Minutes per session: None    Stress: None   Relationships    Social connections     Talks on phone: None     Gets together: None     Attends Synagogue service: None     Active member of club or organization: None     Attends meetings of clubs or organizations: None     Relationship status: None    Intimate partner violence     Fear of current or ex partner: None     Emotionally abused: None     Physically abused: None     Forced sexual activity: None   Other Topics Concern    None   Social History Narrative    Has one son and one daughter.  is a few years older and now has dementia and is on aricept. Current Outpatient Medications on File Prior to Visit   Medication Sig Dispense Refill    predniSONE (DELTASONE) 20 MG tablet Take 3 tablets by mouth daily for 3 days, THEN 2 tablets daily for 3 days, THEN 1 tablet daily for 3 days.  18 tablet 0    pravastatin (PRAVACHOL) 40 MG tablet Take 1 tablet by mouth nightly 90 tablet 3    celecoxib (CELEBREX) 200 MG capsule TAKE 1 CAPSULE BY MOUTH  DAILY 90 capsule 3    levothyroxine (SYNTHROID) 50 MCG tablet TAKE 1 TABLET BY MOUTH ON  MONDAY, WEDNESDAY AND  FRIDAY ALTERNATING WITH THE 75MCG 39 tablet 3    levothyroxine (SYNTHROID) 75 MCG tablet TAKE 1 TABLET BY MOUTH ON  , TUESDAY, THURSDAY  AND SATURDAY 52 tablet 3    busPIRone (BUSPAR) 5 MG tablet Take 1 tablet by mouth 2 times daily as needed (anxiety) 60 tablet 0    Respiratory Therapy Supplies RISSA New CPAP nasal  mask and supplies 1 Device 0    CPAP Machine MISC by Does not apply route Auto CPAP 5-10 cm of H2O 1 each 0    metoprolol succinate (TOPROL XL) 50 MG extended release tablet TAKE 1 TABLET BY MOUTH TWO  TIMES DAILY 180 tablet 3    calcium carbonate 600 MG TABS tablet Take 1 tablet by mouth daily      vitamin D (CHOLECALCIFEROL) 1000 UNIT TABS tablet Take 1,000 Units by mouth daily       No current facility-administered medications on file prior to visit. No Known Allergies    Review of Systems   Constitutional: Negative for appetite change, chills, diaphoresis, fatigue, fever and malaise/fatigue. HENT: Negative for congestion and ear pain. Eyes: Negative for photophobia and visual disturbance. Respiratory: Negative for cough, chest tightness and shortness of breath. Cardiovascular: Negative for chest pain, palpitations and leg swelling. Musculoskeletal: Positive for arthralgias (feet pain). Negative for back pain. Skin: Negative for color change and rash. Neurological: Negative for dizziness and headaches. Psychiatric/Behavioral: Negative for dysphoric mood. The patient is not nervous/anxious. Objective  Vitals:    06/02/20 0951   BP: 130/74   Site: Left Upper Arm   Position: Sitting   Cuff Size: Medium Adult   Pulse: 65   Temp: 98.1 °F (36.7 °C)   TempSrc: Tympanic   SpO2: 98%   Weight: 205 lb (93 kg)   Height: 5' 2\" (1.575 m)     Physical Exam  Constitutional:       General: She is not in acute distress. Appearance: Normal appearance. She is obese. She is not ill-appearing, toxic-appearing or diaphoretic. HENT:      Head: Normocephalic and atraumatic. Right Ear: Tympanic membrane, ear canal and external ear normal. There is no impacted cerumen. Left Ear: Tympanic membrane, ear canal and external ear normal. There is no impacted cerumen. Nose: Nose normal. No congestion or rhinorrhea. Mouth/Throat:      Mouth: Mucous membranes are moist.      Pharynx: Oropharynx is clear. No oropharyngeal exudate or posterior oropharyngeal erythema. Eyes:      Extraocular Movements: Extraocular movements intact.       Conjunctiva/sclera: Conjunctivae normal.      Pupils: Pupils are equal, round, and reactive to light. Neck:      Musculoskeletal: Normal range of motion and neck supple. No muscular tenderness. Cardiovascular:      Rate and Rhythm: Normal rate and regular rhythm. Pulses: Normal pulses. Dorsalis pedis pulses are 2+ on the right side and 2+ on the left side. Posterior tibial pulses are 2+ on the right side and 2+ on the left side. Heart sounds: Normal heart sounds. No murmur. Pulmonary:      Effort: Pulmonary effort is normal. No respiratory distress. Breath sounds: Normal breath sounds. No stridor. No wheezing, rhonchi or rales. Chest:      Chest wall: No tenderness. Musculoskeletal: Normal range of motion. Right lower leg: No edema. Left lower leg: No edema. Right foot: Normal range of motion. Left foot: Normal range of motion. Feet:      Right foot:      Skin integrity: Skin integrity normal. No erythema, warmth or dry skin. Toenail Condition: Right toenails are normal.      Left foot:      Skin integrity: Skin integrity normal. No erythema, warmth or dry skin. Toenail Condition: Left toenails are normal.   Lymphadenopathy:      Cervical: No cervical adenopathy. Skin:     General: Skin is warm and dry. Capillary Refill: Capillary refill takes less than 2 seconds. Coloration: Skin is not jaundiced or pale. Findings: No bruising, erythema, lesion or rash. Neurological:      General: No focal deficit present. Mental Status: She is alert and oriented to person, place, and time. Mental status is at baseline. Cranial Nerves: No cranial nerve deficit. Coordination: Coordination normal.      Gait: Gait normal.   Psychiatric:         Mood and Affect: Mood normal.         Behavior: Behavior normal.         Thought Content: Thought content normal.         Judgment: Judgment normal.         Assessment& Plan     Diagnosis Orders   1.  Right foot pain  Uric Acid    [facility] - Esteban Hoffman DPM, Podiatry,

## 2020-07-13 RX ORDER — METOPROLOL SUCCINATE 50 MG/1
TABLET, EXTENDED RELEASE ORAL
Qty: 180 TABLET | Refills: 3 | Status: SHIPPED | OUTPATIENT
Start: 2020-07-13 | End: 2021-07-19

## 2020-07-14 ENCOUNTER — OFFICE VISIT (OUTPATIENT)
Dept: PULMONOLOGY | Age: 80
End: 2020-07-14
Payer: MEDICARE

## 2020-07-14 VITALS
RESPIRATION RATE: 16 BRPM | HEIGHT: 62 IN | BODY MASS INDEX: 38.46 KG/M2 | TEMPERATURE: 97.7 F | HEART RATE: 71 BPM | SYSTOLIC BLOOD PRESSURE: 120 MMHG | WEIGHT: 209 LBS | OXYGEN SATURATION: 98 % | DIASTOLIC BLOOD PRESSURE: 82 MMHG

## 2020-07-14 PROCEDURE — 99214 OFFICE O/P EST MOD 30 MIN: CPT | Performed by: INTERNAL MEDICINE

## 2020-07-14 ASSESSMENT — ENCOUNTER SYMPTOMS
SINUS PRESSURE: 0
COUGH: 0
VOICE CHANGE: 0
EYE DISCHARGE: 0
TROUBLE SWALLOWING: 0
NAUSEA: 0
CHEST TIGHTNESS: 0
EYE ITCHING: 0
SORE THROAT: 0
RHINORRHEA: 0
DIARRHEA: 0
VOMITING: 0
SHORTNESS OF BREATH: 0
WHEEZING: 0
ABDOMINAL PAIN: 0

## 2020-07-14 NOTE — PROGRESS NOTES
Subjective:     Juan Jimenez is a [de-identified] y.o. female who complains today of:     Chief Complaint   Patient presents with    Follow-up     f/u for LAKSHMI. HPI  She is using Auto  CPAP with  5-10   centimeters of H2O with heated humidity. She is using CPAP for about 7   hours every night. She is using CPAP with   Nasal Mask. She said  sleep is restful with the CPAP use. She is compliant with CPAP therapy and benefiting with CPAP use. No snoring with CPAP use. No complaint of daytime sleepiness or tiredness with CPAP use. She denies taking naps. No sleepiness with driving. She denies difficulty falling asleep or staying asleep. Allergies:  Patient has no known allergies.   Past Medical History:   Diagnosis Date    Bladder prolapse, female, acquired     Chest pain 10/14/2017    Degenerative arthritis of cervical spine 6/29/2012    Diverticulosis     ALEJANDRE (dyspnea on exertion) 10/14/2017    ELIZABETH (generalized anxiety disorder)     Generalized osteoarthritis of multiple sites     knees and neck    Heart palpitations 11/13/2017    History of bone density study 09/2014    Hyperlipidemia 6/29/2012    Hypertension 6/29/2012    Hypothyroidism 6/29/2012    Internal hemorrhoid     Osteopenia 09/2014    Seborrheic dermatitis of scalp      Past Surgical History:   Procedure Laterality Date    ARTHROSCOPY / ARTHROTOMY KNEE Right 10/14/2016    RIGHT KNEE ARTHROSCOPY / LOOSE BODY / PAT ON ADMIT performed by Monroe Cowan [facility]  11/2015    CARPAL TUNNEL RELEASE Right     CATARACT REMOVAL WITH IMPLANT Bilateral     COLONOSCOPY  12/15/05    CYSTOCELE REPAIR      HYSTERECTOMY      RECTOCELE REPAIR       Family History   Problem Relation Age of Onset    Cancer Mother         Liposarcoma    Heart Disease Father         Heart failure, Myelodysplasia    Cancer Brother         Kidney cancer    Diabetes type 2  Maternal Grandmother     Heart Disease Maternal Grandfather     Cancer Paternal Grandmother         Bone cancer    Diabetes Other         parkinson's disease     Social History     Socioeconomic History    Marital status:      Spouse name: Not on file    Number of children: 2    Years of education: Not on file    Highest education level: Not on file   Occupational History    Occupation:    Social Needs    Financial resource strain: Not on file    Food insecurity     Worry: Not on file     Inability: Not on file   [employer] needs     Medical: Not on file     Non-medical: Not on file   Tobacco Use    Smoking status: Former Smoker     Packs/day: 0.50     Years: 15.00     Pack years: 7.50     Types: Cigarettes     Last attempt to quit: 1972     Years since quittin.9    Smokeless tobacco: Never Used   Substance and Sexual Activity    Alcohol use: No    Drug use: No    Sexual activity: Not on file   Lifestyle    Physical activity     Days per week: Not on file     Minutes per session: Not on file    Stress: Not on file   Relationships    Social connections     Talks on phone: Not on file     Gets together: Not on file     Attends Episcopalian service: Not on file     Active member of club or organization: Not on file     Attends meetings of clubs or organizations: Not on file     Relationship status: Not on file    Intimate partner violence     Fear of current or ex partner: Not on file     Emotionally abused: Not on file     Physically abused: Not on file     Forced sexual activity: Not on file   Other Topics Concern    Not on file   Social History Narrative    Has one son and one daughter.  is a few years older and now has dementia and is on aricept. Review of Systems   Constitutional: Negative for chills, diaphoresis, fatigue and fever. HENT: Negative for congestion, mouth sores, nosebleeds, postnasal drip, rhinorrhea, sinus pressure, sneezing, sore throat, trouble swallowing and voice change. and dry. Neurological:      Mental Status: She is alert and oriented to person, place, and time. Coordination: Coordination normal.         Current Outpatient Medications   Medication Sig Dispense Refill    metoprolol succinate (TOPROL XL) 50 MG extended release tablet TAKE 1 TABLET TWICE A  tablet 3    pravastatin (PRAVACHOL) 40 MG tablet Take 1 tablet by mouth nightly 90 tablet 3    celecoxib (CELEBREX) 200 MG capsule TAKE 1 CAPSULE BY MOUTH  DAILY 90 capsule 3    levothyroxine (SYNTHROID) 50 MCG tablet TAKE 1 TABLET BY MOUTH ON  MONDAY, WEDNESDAY AND  FRIDAY ALTERNATING WITH THE 75MCG 39 tablet 3    levothyroxine (SYNTHROID) 75 MCG tablet TAKE 1 TABLET BY MOUTH ON  SUNDAY, TUESDAY, THURSDAY  AND SATURDAY 52 tablet 3    busPIRone (BUSPAR) 5 MG tablet Take 1 tablet by mouth 2 times daily as needed (anxiety) 60 tablet 0    Respiratory Therapy Supplies RISSA New CPAP nasal  mask and supplies 1 Device 0    CPAP Machine MISC by Does not apply route Auto CPAP 5-10 cm of H2O 1 each 0    calcium carbonate 600 MG TABS tablet Take 1 tablet by mouth daily      vitamin D (CHOLECALCIFEROL) 1000 UNIT TABS tablet Take 1,000 Units by mouth daily       No current facility-administered medications for this visit. No results found for this or any previous visit.]  Results for orders placed during the hospital encounter of 10/13/17   XR Chest Portable    Narrative EXAMINATION: XR CHEST PORTABLE    REASON FOR EXAM: chest pain     FINDINGS: Heart is mildly enlarged and there is atherosclerotic changes in the aorta. Pulmonary vasculature within normal limits. Mild increased prominence of the interstitium in the lower lung fields noted but no consolidating pneumonia or evidence of   acute pulmonary edema. Bones and soft tissues intact. Impression CARDIOMEGALY. MILD INTERSTITIAL CHANGES AT THE BASES. NO ACUTE PROCESS IN THE LUNG FIELDS SUSPECTED. Assessment/Plan:     1.  Sleep apnea,

## 2020-09-03 ENCOUNTER — OFFICE VISIT (OUTPATIENT)
Dept: FAMILY MEDICINE CLINIC | Age: 80
End: 2020-09-03
Payer: MEDICARE

## 2020-09-03 VITALS
HEIGHT: 62 IN | SYSTOLIC BLOOD PRESSURE: 132 MMHG | OXYGEN SATURATION: 97 % | HEART RATE: 67 BPM | TEMPERATURE: 97.2 F | WEIGHT: 205 LBS | BODY MASS INDEX: 37.73 KG/M2 | DIASTOLIC BLOOD PRESSURE: 74 MMHG

## 2020-09-03 DIAGNOSIS — I10 HYPERTENSION, UNSPECIFIED TYPE: ICD-10-CM

## 2020-09-03 PROBLEM — E66.01 MORBIDLY OBESE (HCC): Status: ACTIVE | Noted: 2020-09-03

## 2020-09-03 LAB
ALBUMIN SERPL-MCNC: 4.1 G/DL (ref 3.5–4.6)
ALP BLD-CCNC: 76 U/L (ref 40–130)
ALT SERPL-CCNC: 10 U/L (ref 0–33)
ANION GAP SERPL CALCULATED.3IONS-SCNC: 12 MEQ/L (ref 9–15)
AST SERPL-CCNC: 18 U/L (ref 0–35)
BILIRUB SERPL-MCNC: 0.5 MG/DL (ref 0.2–0.7)
BUN BLDV-MCNC: 18 MG/DL (ref 8–23)
CALCIUM SERPL-MCNC: 9.6 MG/DL (ref 8.5–9.9)
CHLORIDE BLD-SCNC: 103 MEQ/L (ref 95–107)
CO2: 22 MEQ/L (ref 20–31)
CREAT SERPL-MCNC: 0.68 MG/DL (ref 0.5–0.9)
GFR AFRICAN AMERICAN: >60
GFR NON-AFRICAN AMERICAN: >60
GLOBULIN: 2.9 G/DL (ref 2.3–3.5)
GLUCOSE BLD-MCNC: 88 MG/DL (ref 70–99)
POTASSIUM SERPL-SCNC: 4.6 MEQ/L (ref 3.4–4.9)
SODIUM BLD-SCNC: 137 MEQ/L (ref 135–144)
TOTAL PROTEIN: 7 G/DL (ref 6.3–8)

## 2020-09-03 PROCEDURE — 99214 OFFICE O/P EST MOD 30 MIN: CPT | Performed by: NURSE PRACTITIONER

## 2020-09-03 RX ORDER — DULOXETIN HYDROCHLORIDE 30 MG/1
30 CAPSULE, DELAYED RELEASE ORAL DAILY
Qty: 30 CAPSULE | Refills: 3 | Status: SHIPPED | OUTPATIENT
Start: 2020-09-03 | End: 2020-09-11 | Stop reason: SINTOL

## 2020-09-03 ASSESSMENT — ENCOUNTER SYMPTOMS
BACK PAIN: 0
CONSTIPATION: 0
TROUBLE SWALLOWING: 0
ABDOMINAL PAIN: 0
COUGH: 0
EYE PAIN: 0
CHEST TIGHTNESS: 0
COLOR CHANGE: 0
SHORTNESS OF BREATH: 0
DIARRHEA: 0

## 2020-09-03 NOTE — PROGRESS NOTES
Subjective  Chief Complaint   Patient presents with    3 Month Follow-Up     HTN       Hypertension   This is a chronic problem. The current episode started more than 1 year ago. The problem is unchanged. The problem is controlled. Pertinent negatives include no chest pain, malaise/fatigue, palpitations, peripheral edema or shortness of breath. Agents associated with hypertension include NSAIDs. Risk factors for coronary artery disease include obesity. Past treatments include beta blockers. The current treatment provides significant improvement. There are no compliance problems. There is no history of CAD/MI, heart failure or PVD. Saw podiatry for right foot pain. Diagnosed with plantar fasciitis, treated for that and it improved. However is still having significant pain in her left foot and ankle. Also having pains in hands, back and neck. Has not had injection in her knee since December. Neck will lock up, cold compresses help, This is happening more often. Feels her arthritis is flaring up all over. Taking celebrex but is just not getting relief. Has tried other things but celebrex is the only thing that has not bothered her stomach.        Past Medical History:   Diagnosis Date    Bladder prolapse, female, acquired     Chest pain 10/14/2017    Degenerative arthritis of cervical spine 6/29/2012    Diverticulosis     ALEJANDRE (dyspnea on exertion) 10/14/2017    ELIZABETH (generalized anxiety disorder)     Generalized osteoarthritis of multiple sites     knees and neck    Heart palpitations 11/13/2017    History of bone density study 09/2014    Hyperlipidemia 6/29/2012    Hypertension 6/29/2012    Hypothyroidism 6/29/2012    Internal hemorrhoid     Osteopenia 09/2014    Seborrheic dermatitis of scalp      Patient Active Problem List    Diagnosis Date Noted    Morbidly obese ([address] 09/03/2020    Sleep apnea 09/03/2019    Obesity (BMI 30-39.9) 09/03/2019    Polycythemia 09/03/2019    Heart palpitations 2019    Mixed hyperlipidemia 2019    ALEJANDRE (dyspnea on exertion) 2019    Elevated hemoglobin (HCC) 05/10/2019    Generalized osteoarthritis of multiple sites     ELIZABETH (generalized anxiety disorder)     Seborrheic dermatitis of scalp     Osteoarthritis of knees, bilateral     Hypertension 2012    Hyperlipidemia 2012    Hypothyroidism 2012    Degenerative arthritis of cervical spine 2012     Past Surgical History:   Procedure Laterality Date    ARTHROSCOPY / ARTHROTOMY KNEE Right 10/14/2016    RIGHT KNEE ARTHROSCOPY / LOOSE BODY / PAT ON ADMIT performed by Katelin Mondragon MD at [address] [facility]  2015    CARPAL TUNNEL RELEASE Right     CATARACT REMOVAL WITH IMPLANT Bilateral     COLONOSCOPY  12/15/05    CYSTOCELE REPAIR      HYSTERECTOMY      RECTOCELE REPAIR       Family History   Problem Relation Age of Onset    Cancer Mother         Liposarcoma    Heart Disease Father         Heart failure, Myelodysplasia    Cancer Brother         Kidney cancer    Diabetes type 2  Maternal Grandmother     Heart Disease Maternal Grandfather     Cancer Paternal Grandmother         Bone cancer    Diabetes Other         parkinson's disease     Social History     Socioeconomic History    Marital status:      Spouse name: None    Number of children: 2    Years of education: None    Highest education level: None   Occupational History    Occupation:    Social Needs    Financial resource strain: None    Food insecurity     Worry: None     Inability: None    Transportation needs     Medical: None     Non-medical: None   Tobacco Use    Smoking status: Former Smoker     Packs/day: 0.50     Years: 15.00     Pack years: 7.50     Types: Cigarettes     Last attempt to quit: 1972     Years since quittin.0    Smokeless tobacco: Never Used   Substance and Sexual Activity    Alcohol use: No    Drug use: No    Sexual activity: None Lifestyle    Physical activity     Days per week: None     Minutes per session: None    Stress: None   Relationships    Social connections     Talks on phone: None     Gets together: None     Attends Sikh service: None     Active member of club or organization: None     Attends meetings of clubs or organizations: None     Relationship status: None    Intimate partner violence     Fear of current or ex partner: None     Emotionally abused: None     Physically abused: None     Forced sexual activity: None   Other Topics Concern    None   Social History Narrative    Has one son and one daughter.  is a few years older and now has dementia and is on aricept. Current Outpatient Medications on File Prior to Visit   Medication Sig Dispense Refill    metoprolol succinate (TOPROL XL) 50 MG extended release tablet TAKE 1 TABLET TWICE A  tablet 3    pravastatin (PRAVACHOL) 40 MG tablet Take 1 tablet by mouth nightly 90 tablet 3    celecoxib (CELEBREX) 200 MG capsule TAKE 1 CAPSULE BY MOUTH  DAILY 90 capsule 3    levothyroxine (SYNTHROID) 50 MCG tablet TAKE 1 TABLET BY MOUTH ON  MONDAY, WEDNESDAY AND  FRIDAY ALTERNATING WITH THE 75MCG 39 tablet 3    levothyroxine (SYNTHROID) 75 MCG tablet TAKE 1 TABLET BY MOUTH ON  SUNDAY, TUESDAY, THURSDAY  AND SATURDAY 52 tablet 3    busPIRone (BUSPAR) 5 MG tablet Take 1 tablet by mouth 2 times daily as needed (anxiety) 60 tablet 0    Respiratory Therapy Supplies RISSA New CPAP nasal  mask and supplies 1 Device 0    CPAP Machine MISC by Does not apply route Auto CPAP 5-10 cm of H2O 1 each 0    calcium carbonate 600 MG TABS tablet Take 1 tablet by mouth daily      vitamin D (CHOLECALCIFEROL) 1000 UNIT TABS tablet Take 1,000 Units by mouth daily       No current facility-administered medications on file prior to visit.       No Known Allergies    Review of Systems   Constitutional: Negative for activity change, appetite change, chills, diaphoresis, fatigue, fever and malaise/fatigue. HENT: Negative for congestion, ear pain, hearing loss and trouble swallowing. Eyes: Negative for pain and visual disturbance. Respiratory: Negative for cough, chest tightness and shortness of breath. Cardiovascular: Negative for chest pain, palpitations and leg swelling. Gastrointestinal: Negative for abdominal pain, constipation and diarrhea. Endocrine: Negative for polydipsia, polyphagia and polyuria. Genitourinary: Negative for difficulty urinating and dysuria. Musculoskeletal: Positive for arthralgias. Negative for back pain. Skin: Negative for color change and rash. Neurological: Negative for dizziness and light-headedness. Psychiatric/Behavioral: Negative for dysphoric mood. The patient is nervous/anxious. Objective  Vitals:    09/03/20 1000   BP: 132/74   Site: Left Upper Arm   Position: Sitting   Cuff Size: Medium Adult   Pulse: 67   Temp: 97.2 °F (36.2 °C)   TempSrc: Infrared   SpO2: 97%   Weight: 205 lb (93 kg)   Height: 5' 2\" (1.575 m)     Physical Exam  Constitutional:       General: She is not in acute distress. Appearance: Normal appearance. She is obese. She is not ill-appearing, toxic-appearing or diaphoretic. HENT:      Head: Normocephalic and atraumatic. Right Ear: External ear normal.      Left Ear: External ear normal.   Neck:      Musculoskeletal: Normal range of motion and neck supple. No neck rigidity or muscular tenderness. Cardiovascular:      Rate and Rhythm: Normal rate and regular rhythm. Pulses: Normal pulses. Heart sounds: Normal heart sounds. No murmur. Pulmonary:      Effort: Pulmonary effort is normal. No respiratory distress. Breath sounds: Normal breath sounds. No stridor. No wheezing, rhonchi or rales. Chest:      Chest wall: No tenderness. Musculoskeletal: Normal range of motion. Right lower leg: No edema. Left lower leg: No edema.    Lymphadenopathy:      Cervical: No cervical adenopathy. Skin:     General: Skin is warm and dry. Capillary Refill: Capillary refill takes less than 2 seconds. Coloration: Skin is not jaundiced or pale. Findings: No bruising, erythema, lesion or rash. Neurological:      General: No focal deficit present. Mental Status: She is alert and oriented to person, place, and time. Mental status is at baseline. Cranial Nerves: No cranial nerve deficit. Coordination: Coordination normal.      Gait: Gait normal.   Psychiatric:         Mood and Affect: Mood normal.         Behavior: Behavior normal.         Thought Content: Thought content normal.         Judgment: Judgment normal.         Assessment& Plan     Diagnosis Orders   1. Chronic musculoskeletal pain  DULoxetine (CYMBALTA) 30 MG extended release capsule   2. Generalized osteoarthritis of multiple sites  DULoxetine (CYMBALTA) 30 MG extended release capsule   3. Hypertension, unspecified type  Comprehensive Metabolic Panel   4. Morbidly obese (HCC)       Trial of cymbalta. Does not want to see rheumatology at this time. Check labs. Continue current regimen. F/u in 2 weeks or sooner PRN. Side effects, adverse effects of the medication prescribed today, as well as treatment plan/ rationale and result expectations have been discussed with the patient who expresses understanding and desires to proceed. Close follow up to evaluate treatment results and for coordination of care. I have reviewed the patient's medical history in detail and updated the computerized patient record. As always, patient is advised that if symptoms worsen in any way they will proceed to the nearest emergency room.        Orders Placed This Encounter   Procedures    Comprehensive Metabolic Panel     Standing Status:   Future     Number of Occurrences:   1     Standing Expiration Date:   9/3/2021       Orders Placed This Encounter   Medications    DULoxetine (CYMBALTA) 30 MG extended release capsule Sig: Take 1 capsule by mouth daily     Dispense:  30 capsule     Refill:  3       Return in about 2 weeks (around 9/17/2020) for f/u arthritis.     Khadra Kelly, LYNDA - CNP

## 2020-09-08 ENCOUNTER — TELEPHONE (OUTPATIENT)
Dept: FAMILY MEDICINE CLINIC | Age: 80
End: 2020-09-08

## 2020-09-08 NOTE — TELEPHONE ENCOUNTER
fyi    Patient states she is unable to tolerate medication. And will not continue to take the medication started at the last visit -  Cymbalta. Patient also cancelled follow up appt.

## 2020-09-11 ENCOUNTER — OFFICE VISIT (OUTPATIENT)
Dept: CARDIOLOGY CLINIC | Age: 80
End: 2020-09-11
Payer: MEDICARE

## 2020-09-11 VITALS
SYSTOLIC BLOOD PRESSURE: 130 MMHG | OXYGEN SATURATION: 98 % | DIASTOLIC BLOOD PRESSURE: 82 MMHG | BODY MASS INDEX: 37.31 KG/M2 | RESPIRATION RATE: 16 BRPM | HEART RATE: 70 BPM | WEIGHT: 204 LBS

## 2020-09-11 PROCEDURE — 93000 ELECTROCARDIOGRAM COMPLETE: CPT | Performed by: INTERNAL MEDICINE

## 2020-09-11 PROCEDURE — 99214 OFFICE O/P EST MOD 30 MIN: CPT | Performed by: INTERNAL MEDICINE

## 2020-09-11 ASSESSMENT — ENCOUNTER SYMPTOMS
GASTROINTESTINAL NEGATIVE: 1
NAUSEA: 0
STRIDOR: 0
EYES NEGATIVE: 1
BLOOD IN STOOL: 0
CHEST TIGHTNESS: 0
COUGH: 0
WHEEZING: 0

## 2020-09-11 NOTE — PROGRESS NOTES
Subsequent Progress Note  Patient: Jade Mason  YOB: 1940  MRN: [id number]    Chief Complaint: alejandre  htn palp polycythemia LAKSHMI  Chief Complaint   Patient presents with    6 Month Follow-Up    Hypertension    Hyperlipidemia       CV Data:  10/2017 SPECT negative  10/2017 ECHO 60%       Subjective/HPI: no cp +d no falls no bleed eats well. occ palp     9/27/2019: had 2U Phlebotomy 2 mo ago. This made her feel better. No cp no sob no falls no bleed. 3/6/2020 still getting Phlebotomy  No cp no sob no falls no bleed. 9/11/2020 no cp no sob no falls no bleed. Uses CPAP.      EKG: SR 71    Past Medical History:   Diagnosis Date    Bladder prolapse, female, acquired     Chest pain 10/14/2017    Degenerative arthritis of cervical spine 6/29/2012    Diverticulosis     ALEJANDRE (dyspnea on exertion) 10/14/2017    ELIZABETH (generalized anxiety disorder)     Generalized osteoarthritis of multiple sites     knees and neck    Heart palpitations 11/13/2017    History of bone density study 09/2014    Hyperlipidemia 6/29/2012    Hypertension 6/29/2012    Hypothyroidism 6/29/2012    Internal hemorrhoid     Osteopenia 09/2014    Seborrheic dermatitis of scalp        Past Surgical History:   Procedure Laterality Date    ARTHROSCOPY / ARTHROTOMY KNEE Right 10/14/2016    RIGHT KNEE ARTHROSCOPY / LOOSE BODY / PAT ON ADMIT performed by Terence Plasencia [facility]  11/2015    CARPAL TUNNEL RELEASE Right     CATARACT REMOVAL WITH IMPLANT Bilateral     COLONOSCOPY  12/15/05    CYSTOCELE REPAIR      HYSTERECTOMY      RECTOCELE REPAIR         Family History   Problem Relation Age of Onset    Cancer Mother         Liposarcoma    Heart Disease Father         Heart failure, Myelodysplasia    Cancer Brother         Kidney cancer    Diabetes type 2  Maternal Grandmother     Heart Disease Maternal Grandfather     Cancer Paternal Grandmother         Bone cancer    Diabetes Other parkinson's disease       Social History     Socioeconomic History    Marital status:      Spouse name: None    Number of children: 2    Years of education: None    Highest education level: None   Occupational History    Occupation:    Social Needs    Financial resource strain: None    Food insecurity     Worry: None     Inability: None    Transportation needs     Medical: None     Non-medical: None   Tobacco Use    Smoking status: Former Smoker     Packs/day: 0.50     Years: 15.00     Pack years: 7.50     Types: Cigarettes     Last attempt to quit: 1972     Years since quittin.0    Smokeless tobacco: Never Used   Substance and Sexual Activity    Alcohol use: No    Drug use: No    Sexual activity: None   Lifestyle    Physical activity     Days per week: None     Minutes per session: None    Stress: None   Relationships    Social connections     Talks on phone: None     Gets together: None     Attends Rastafari service: None     Active member of club or organization: None     Attends meetings of clubs or organizations: None     Relationship status: None    Intimate partner violence     Fear of current or ex partner: None     Emotionally abused: None     Physically abused: None     Forced sexual activity: None   Other Topics Concern    None   Social History Narrative    Has one son and one daughter.  is a few years older and now has dementia and is on aricept.         No Known Allergies    Current Outpatient Medications   Medication Sig Dispense Refill    metoprolol succinate (TOPROL XL) 50 MG extended release tablet TAKE 1 TABLET TWICE A  tablet 3    pravastatin (PRAVACHOL) 40 MG tablet Take 1 tablet by mouth nightly 90 tablet 3    celecoxib (CELEBREX) 200 MG capsule TAKE 1 CAPSULE BY MOUTH  DAILY 90 capsule 3    levothyroxine (SYNTHROID) 50 MCG tablet TAKE 1 TABLET BY MOUTH ON  MONDAY, WEDNESDAY AND  FRIDAY ALTERNATING WITH THE 75MCG 39 tablet 3  levothyroxine (SYNTHROID) 75 MCG tablet TAKE 1 TABLET BY MOUTH ON  SUNDAY, TUESDAY, THURSDAY  AND SATURDAY 52 tablet 3    busPIRone (BUSPAR) 5 MG tablet Take 1 tablet by mouth 2 times daily as needed (anxiety) 60 tablet 0    Respiratory Therapy Supplies RISSA New CPAP nasal  mask and supplies 1 Device 0    CPAP Machine MISC by Does not apply route Auto CPAP 5-10 cm of H2O 1 each 0    calcium carbonate 600 MG TABS tablet Take 1 tablet by mouth daily      vitamin D (CHOLECALCIFEROL) 1000 UNIT TABS tablet Take 1,000 Units by mouth daily       No current facility-administered medications for this visit. Review of Systems:   Review of Systems   Constitutional: Negative. Negative for diaphoresis and fatigue. HENT: Negative. Eyes: Negative. Respiratory: Negative for cough, chest tightness, wheezing and stridor. Cardiovascular: Negative for chest pain and leg swelling. Gastrointestinal: Negative. Negative for blood in stool and nausea. Genitourinary: Negative. Musculoskeletal: Negative. Skin: Negative. Neurological: Negative. Negative for dizziness, syncope, weakness and light-headedness. Hematological: Negative. Psychiatric/Behavioral: Negative. Physical Examination:    /82 (Site: Right Upper Arm, Position: Sitting, Cuff Size: Large Adult)   Pulse 70   Resp 16   Wt 204 lb (92.5 kg)   LMP  (LMP Unknown)   SpO2 98%   BMI 37.31 kg/m²    Physical Exam   Constitutional: She appears healthy. No distress. HENT:   Normal cephalic and Atraumatic   Eyes: Pupils are equal, round, and reactive to light. Neck: Normal range of motion and thyroid normal. Neck supple. No JVD present. No neck adenopathy. No thyromegaly present. Cardiovascular: Normal rate, regular rhythm, normal heart sounds, intact distal pulses and normal pulses. Pulmonary/Chest: Effort normal and breath sounds normal. She has no wheezes. She has no rales. She exhibits no tenderness.    Abdominal: MG 2.0 10/14/2017     TSH:  Lab Results   Component Value Date    TSH 2.770 09/25/2018       Patient Active Problem List   Diagnosis    Hypertension    Hyperlipidemia    Hypothyroidism    Degenerative arthritis of cervical spine    Osteoarthritis of knees, bilateral    Seborrheic dermatitis of scalp    Generalized osteoarthritis of multiple sites    ELIZABETH (generalized anxiety disorder)    Elevated hemoglobin (HCC)    Heart palpitations    Mixed hyperlipidemia    ALEJANDRE (dyspnea on exertion)    Sleep apnea    Obesity (BMI 30-39. 9)    Polycythemia    Morbidly obese (HCC)       Medications Discontinued During This Encounter   Medication Reason    DULoxetine (CYMBALTA) 30 MG extended release capsule Side effects       Modified Medications    No medications on file       No orders of the defined types were placed in this encounter. Assessment/Plan:    1. Essential hypertension   stable     2. Heart palpitations   stable - occasion. Much improved. 3. Mixed hyperlipidemia   statin     4. ALEJANDRE (dyspnea on exertion)  Stable. 5.  Polycythemia -  see Hematology      6. LAKSHMI- CPAP   Counseling:  Heart Healthy Lifestyle, Low Salt Diet, Take Precautions to Prevent Falls and Walk Daily    Return in about 6 months (around 3/11/2021).       Electronically signed by Cydney Osborn MD on 9/11/2020 at 4:07 PM

## 2020-12-15 RX ORDER — CELECOXIB 200 MG/1
CAPSULE ORAL
Qty: 90 CAPSULE | Refills: 3 | Status: SHIPPED | OUTPATIENT
Start: 2020-12-15 | End: 2021-12-27

## 2021-02-02 ENCOUNTER — OFFICE VISIT (OUTPATIENT)
Dept: FAMILY MEDICINE CLINIC | Age: 81
End: 2021-02-02
Payer: MEDICARE

## 2021-02-02 VITALS
SYSTOLIC BLOOD PRESSURE: 132 MMHG | OXYGEN SATURATION: 96 % | HEART RATE: 76 BPM | HEIGHT: 62 IN | WEIGHT: 205 LBS | DIASTOLIC BLOOD PRESSURE: 82 MMHG | BODY MASS INDEX: 37.73 KG/M2 | TEMPERATURE: 98.2 F

## 2021-02-02 DIAGNOSIS — E03.9 ACQUIRED HYPOTHYROIDISM: ICD-10-CM

## 2021-02-02 DIAGNOSIS — G25.0 BENIGN ESSENTIAL TREMOR: ICD-10-CM

## 2021-02-02 DIAGNOSIS — D45 POLYCYTHEMIA VERA (HCC): ICD-10-CM

## 2021-02-02 DIAGNOSIS — Z91.81 AT HIGH RISK FOR FALLS: ICD-10-CM

## 2021-02-02 DIAGNOSIS — E78.2 MIXED HYPERLIPIDEMIA: ICD-10-CM

## 2021-02-02 DIAGNOSIS — G47.30 SLEEP APNEA, UNSPECIFIED TYPE: ICD-10-CM

## 2021-02-02 DIAGNOSIS — E03.9 ACQUIRED HYPOTHYROIDISM: Primary | ICD-10-CM

## 2021-02-02 DIAGNOSIS — I10 HYPERTENSION, UNSPECIFIED TYPE: ICD-10-CM

## 2021-02-02 LAB — TSH REFLEX: 2.79 UIU/ML (ref 0.44–3.86)

## 2021-02-02 PROCEDURE — 99214 OFFICE O/P EST MOD 30 MIN: CPT | Performed by: NURSE PRACTITIONER

## 2021-02-02 SDOH — ECONOMIC STABILITY: TRANSPORTATION INSECURITY
IN THE PAST 12 MONTHS, HAS LACK OF TRANSPORTATION KEPT YOU FROM MEETINGS, WORK, OR FROM GETTING THINGS NEEDED FOR DAILY LIVING?: NO

## 2021-02-02 SDOH — ECONOMIC STABILITY: FOOD INSECURITY: WITHIN THE PAST 12 MONTHS, THE FOOD YOU BOUGHT JUST DIDN'T LAST AND YOU DIDN'T HAVE MONEY TO GET MORE.: NEVER TRUE

## 2021-02-02 SDOH — ECONOMIC STABILITY: INCOME INSECURITY: HOW HARD IS IT FOR YOU TO PAY FOR THE VERY BASICS LIKE FOOD, HOUSING, MEDICAL CARE, AND HEATING?: NOT HARD AT ALL

## 2021-02-02 ASSESSMENT — ENCOUNTER SYMPTOMS
BACK PAIN: 0
PHOTOPHOBIA: 0
COUGH: 0
SHORTNESS OF BREATH: 0
COLOR CHANGE: 0
CHEST TIGHTNESS: 0
BLURRED VISION: 0

## 2021-02-02 ASSESSMENT — PATIENT HEALTH QUESTIONNAIRE - PHQ9
SUM OF ALL RESPONSES TO PHQ QUESTIONS 1-9: 0

## 2021-02-02 NOTE — PATIENT INSTRUCTIONS
Patient Education        Benign Essential Tremor: Care Instructions  Your Care Instructions     Benign essential tremor is a medical term for shaking that you can't control. Your hand or fingers may shake when you lift a cup or point at something. Or your voice may shake when you speak. This type of tremor is not harmful. It is not caused by a stroke or Parkinson's disease. Some things can affect how much you shake. For example, drinking or eating something with caffeine may make tremors worse for a while. Some medicines also can increase tremors. These include antidepressants and too much thyroid replacement. Talk to your doctor if you think one of your medicines makes your tremors worse. If you are self-conscious about your tremors, there are some things you can do to reduce them or make them less noticeable. This includes taking medicine. Follow-up care is a key part of your treatment and safety. Be sure to make and go to all appointments, and call your doctor if you are having problems. It's also a good idea to know your test results and keep a list of the medicines you take. How can you care for yourself at home? · Take your medicines exactly as prescribed. Call your doctor if you think you are having a problem with your medicine. Some medicines that help control tremors have to be taken every day, even if you are not having tremors. You will get more details on the specific medicines your doctor prescribes. · Get plenty of rest.  · Eat a balanced, healthy diet. · Try to reduce stress. Regular exercise and massages may help. · Limit alcohol. Heavy drinking can make your tremors worse. · Avoid drinks or foods with caffeine if they make your tremors worse. These include tea, cola, coffee, and chocolate. · Wear a heavy bracelet or watch. This adds a little weight to your hand. The extra weight may reduce tremors. · Drink from cups or glasses that are only half full. You may also want to try drinking with a straw. When should you call for help? Watch closely for changes in your health, and be sure to contact your doctor if:    · You notice your tremors are getting worse.     · You can't do your everyday activities because of your tremors.     · You are sad and embarrassed about your shaking. Where can you learn more? Go to https://[email or web address]. org and sign in to your [employer] account. Enter B746 in the [employer] box to learn more about \"Benign Essential Tremor: Care Instructions. \"     If you do not have an account, please click on the \"Sign Up Now\" link. Current as of: November 20, 2019               Content Version: 12.6  © 3247-3813 [employer]. Care instructions adapted under license by [facility] ([facility]). If you have questions about a medical condition or this instruction, always ask your healthcare professional. [address] any warranty or liability for your use of this information.

## 2021-02-02 NOTE — PROGRESS NOTES
Subjective  Chief Complaint   Patient presents with    Check-Up    Thyroid Problem     thyroid check       Hypertension  This is a chronic problem. The current episode started more than 1 year ago. The problem is unchanged. The problem is controlled. Pertinent negatives include no blurred vision, chest pain, headaches, malaise/fatigue, palpitations, peripheral edema or shortness of breath. Agents associated with hypertension include thyroid hormones. Risk factors for coronary artery disease include obesity and post-menopausal state. 6 month check up. Following with hematology for polycythemia vera. Gets CBC checked every couple of months. Did have to get phlebotomy last time she went. Goes again on 2/11/21. Had f/u with Dr. Kaitlin Ross in September. Sees him again in March. Having some tremors in bilateral hands for approx 1 year. Not worsening, does not want to pursue work up. NO other symptoms.     Past Medical History:   Diagnosis Date    Bladder prolapse, female, acquired     Chest pain 10/14/2017    Degenerative arthritis of cervical spine 6/29/2012    Diverticulosis     ALEJANDRE (dyspnea on exertion) 10/14/2017    ELIZABETH (generalized anxiety disorder)     Generalized osteoarthritis of multiple sites     knees and neck    Heart palpitations 11/13/2017    History of bone density study 09/2014    Hyperlipidemia 6/29/2012    Hypertension 6/29/2012    Hypothyroidism 6/29/2012    Internal hemorrhoid     Osteopenia 09/2014    Seborrheic dermatitis of scalp      Patient Active Problem List    Diagnosis Date Noted    Morbidly obese ([address] 09/03/2020    Sleep apnea 09/03/2019    Obesity (BMI 30-39.9) 09/03/2019    Polycythemia 09/03/2019    Heart palpitations 05/20/2019    Mixed hyperlipidemia 05/20/2019    ALEJANDRE (dyspnea on exertion) 05/20/2019    Elevated hemoglobin (HCC) 05/10/2019    Generalized osteoarthritis of multiple sites     ELIZABETH (generalized anxiety disorder)     Seborrheic dermatitis of scalp     Osteoarthritis of knees, bilateral     Hypertension 2012    Hyperlipidemia 2012    Hypothyroidism 2012    Degenerative arthritis of cervical spine 2012     Past Surgical History:   Procedure Laterality Date    ARTHROSCOPY / ARTHROTOMY KNEE Right 10/14/2016    RIGHT KNEE ARTHROSCOPY / LOOSE BODY / PAT ON ADMIT performed by Phil Arroyo MD at [address]  2015    CARPAL TUNNEL RELEASE Right     CATARACT REMOVAL WITH IMPLANT Bilateral     COLONOSCOPY  12/15/05    CYSTOCELE REPAIR      HYSTERECTOMY      RECTOCELE REPAIR       Family History   Problem Relation Age of Onset    Cancer Mother         Liposarcoma    Heart Disease Father         Heart failure, Myelodysplasia    Cancer Brother         Kidney cancer    Diabetes type 2  Maternal Grandmother     Heart Disease Maternal Grandfather     Cancer Paternal Grandmother         Bone cancer    Diabetes Other         parkinson's disease     Social History     Socioeconomic History    Marital status:      Spouse name: None    Number of children: 2    Years of education: None    Highest education level: None   Occupational History    Occupation:    Social Needs    Financial resource strain: Not hard at all    Food insecurity     Worry: Never true     Inability: Never true    Transportation needs     Medical: No     Non-medical: No   Tobacco Use    Smoking status: Former Smoker     Packs/day: 0.50     Years: 15.00     Pack years: 7.50     Types: Cigarettes     Quit date: 1972     Years since quittin.4    Smokeless tobacco: Never Used   Substance and Sexual Activity    Alcohol use: No    Drug use: No    Sexual activity: None   Lifestyle    Physical activity     Days per week: None     Minutes per session: None    Stress: None   Relationships    Social connections     Talks on phone: None     Gets together: None     Attends Pentecostalism service: None Active member of club or organization: None     Attends meetings of clubs or organizations: None     Relationship status: None    Intimate partner violence     Fear of current or ex partner: None     Emotionally abused: None     Physically abused: None     Forced sexual activity: None   Other Topics Concern    None   Social History Narrative    Has one son and one daughter.  is a few years older and now has dementia and is on aricept. Current Outpatient Medications on File Prior to Visit   Medication Sig Dispense Refill    celecoxib (CELEBREX) 200 MG capsule TAKE 1 CAPSULE DAILY 90 capsule 3    metoprolol succinate (TOPROL XL) 50 MG extended release tablet TAKE 1 TABLET TWICE A  tablet 3    pravastatin (PRAVACHOL) 40 MG tablet Take 1 tablet by mouth nightly 90 tablet 3    levothyroxine (SYNTHROID) 50 MCG tablet TAKE 1 TABLET BY MOUTH ON  MONDAY, WEDNESDAY AND  FRIDAY ALTERNATING WITH THE 75MCG 39 tablet 3    levothyroxine (SYNTHROID) 75 MCG tablet TAKE 1 TABLET BY MOUTH ON  SUNDAY, TUESDAY, THURSDAY  AND SATURDAY 52 tablet 3    busPIRone (BUSPAR) 5 MG tablet Take 1 tablet by mouth 2 times daily as needed (anxiety) 60 tablet 0    Respiratory Therapy Supplies RISSA New CPAP nasal  mask and supplies 1 Device 0    CPAP Machine MISC by Does not apply route Auto CPAP 5-10 cm of H2O 1 each 0    calcium carbonate 600 MG TABS tablet Take 1 tablet by mouth daily      vitamin D (CHOLECALCIFEROL) 1000 UNIT TABS tablet Take 1,000 Units by mouth daily       No current facility-administered medications on file prior to visit. No Known Allergies    Review of Systems   Constitutional: Negative for chills, diaphoresis, fatigue, fever and malaise/fatigue. HENT: Negative for congestion and ear pain. Eyes: Negative for blurred vision, photophobia and visual disturbance. Respiratory: Negative for cough, chest tightness and shortness of breath.     Cardiovascular: Negative for chest pain, palpitations and leg swelling. Endocrine: Negative for polydipsia, polyphagia and polyuria. Genitourinary: Negative for difficulty urinating and dysuria. Musculoskeletal: Negative for arthralgias and back pain. Skin: Negative for color change and rash. Neurological: Negative for headaches. Psychiatric/Behavioral: Negative for dysphoric mood. The patient is not nervous/anxious. Objective  Vitals:    02/02/21 1423   BP: 132/82   Site: Left Upper Arm   Position: Sitting   Cuff Size: Large Adult   Pulse: 76   Temp: 98.2 °F (36.8 °C)   TempSrc: Infrared   SpO2: 96%   Weight: 205 lb (93 kg)   Height: 5' 2\" (1.575 m)     Physical Exam  Constitutional:       General: She is not in acute distress. Appearance: Normal appearance. She is normal weight. She is not ill-appearing, toxic-appearing or diaphoretic. HENT:      Head: Normocephalic and atraumatic. Right Ear: External ear normal.      Left Ear: External ear normal.   Neck:      Musculoskeletal: Normal range of motion and neck supple. No muscular tenderness. Cardiovascular:      Rate and Rhythm: Normal rate and regular rhythm. Pulses: Normal pulses. Heart sounds: Normal heart sounds. No murmur. Pulmonary:      Effort: Pulmonary effort is normal. No respiratory distress. Breath sounds: Normal breath sounds. No stridor. No wheezing, rhonchi or rales. Chest:      Chest wall: No tenderness. Musculoskeletal: Normal range of motion. Right lower leg: No edema. Left lower leg: No edema. Lymphadenopathy:      Cervical: No cervical adenopathy. Skin:     General: Skin is warm and dry. Capillary Refill: Capillary refill takes less than 2 seconds. Coloration: Skin is not jaundiced or pale. Findings: No bruising, erythema, lesion or rash. Neurological:      General: No focal deficit present. Mental Status: She is alert and oriented to person, place, and time. Mental status is at baseline. Cranial Nerves: No cranial nerve deficit. Coordination: Coordination normal.      Gait: Gait normal.   Psychiatric:         Mood and Affect: Mood normal.         Behavior: Behavior normal.         Thought Content: Thought content normal.         Judgment: Judgment normal.         Assessment& Plan     Diagnosis Orders   1. Acquired hypothyroidism  TSH with Reflex   2. Hypertension, unspecified type  Comprehensive Metabolic Panel   3. Polycythemia vera ([address]     4. Sleep apnea, unspecified type     5. Mixed hyperlipidemia  Lipid Panel   6. Benign essential tremor       Check labs as ordered. Continue to follow with all specialists. F/u in 6 months or sooner PRN. Side effects, adverse effects of the medication prescribed today, as well as treatment plan/ rationale and result expectations have been discussed with the patient who expresses understanding and desires to proceed. Close follow up to evaluate treatment results and for coordination of care. I have reviewed the patient's medical history in detail and updated the computerized patient record. As always, patient is advised that if symptoms worsen in any way they will proceed to the nearest emergency room. Orders Placed This Encounter   Procedures    Lipid Panel     Standing Status:   Future     Standing Expiration Date:   2/2/2022     Order Specific Question:   Is Patient Fasting?/# of Hours     Answer:   y/12    TSH with Reflex     Standing Status:   Future     Number of Occurrences:   1     Standing Expiration Date:   2/2/2022    Comprehensive Metabolic Panel     Standing Status:   Future     Standing Expiration Date:   2/2/2022       No orders of the defined types were placed in this encounter. Return in about 6 months (around 8/2/2021) for htn, thyroid. LYNDA Bueno CNP    On the basis of positive falls risk screening, assessment and plan is as follows: home safety tips provided.

## 2021-02-04 ENCOUNTER — TELEPHONE (OUTPATIENT)
Dept: FAMILY MEDICINE CLINIC | Age: 81
End: 2021-02-04

## 2021-02-04 DIAGNOSIS — E03.9 ACQUIRED HYPOTHYROIDISM: ICD-10-CM

## 2021-02-04 RX ORDER — LEVOTHYROXINE SODIUM 0.07 MG/1
TABLET ORAL
Qty: 52 TABLET | Refills: 3 | Status: SHIPPED | OUTPATIENT
Start: 2021-02-04 | End: 2022-02-03

## 2021-02-04 RX ORDER — LEVOTHYROXINE SODIUM 0.05 MG/1
TABLET ORAL
Qty: 39 TABLET | Refills: 3 | Status: SHIPPED | OUTPATIENT
Start: 2021-02-04 | End: 2022-02-03

## 2021-03-02 DIAGNOSIS — I10 HYPERTENSION, UNSPECIFIED TYPE: ICD-10-CM

## 2021-03-02 DIAGNOSIS — E78.2 MIXED HYPERLIPIDEMIA: ICD-10-CM

## 2021-03-02 LAB
ALBUMIN SERPL-MCNC: 3.9 G/DL (ref 3.5–4.6)
ALP BLD-CCNC: 80 U/L (ref 40–130)
ALT SERPL-CCNC: <5 U/L (ref 0–33)
ANION GAP SERPL CALCULATED.3IONS-SCNC: 12 MEQ/L (ref 9–15)
AST SERPL-CCNC: <5 U/L (ref 0–35)
BILIRUB SERPL-MCNC: 0.6 MG/DL (ref 0.2–0.7)
BUN BLDV-MCNC: 19 MG/DL (ref 8–23)
CALCIUM SERPL-MCNC: 9 MG/DL (ref 8.5–9.9)
CHLORIDE BLD-SCNC: 104 MEQ/L (ref 95–107)
CHOLESTEROL, TOTAL: 161 MG/DL (ref 0–199)
CO2: 23 MEQ/L (ref 20–31)
CREAT SERPL-MCNC: 0.68 MG/DL (ref 0.5–0.9)
GFR AFRICAN AMERICAN: >60
GFR NON-AFRICAN AMERICAN: >60
GLOBULIN: 3.1 G/DL (ref 2.3–3.5)
GLUCOSE BLD-MCNC: 86 MG/DL (ref 70–99)
HDLC SERPL-MCNC: 52 MG/DL (ref 40–59)
LDL CHOLESTEROL CALCULATED: 80 MG/DL (ref 0–129)
POTASSIUM SERPL-SCNC: 4.8 MEQ/L (ref 3.4–4.9)
SODIUM BLD-SCNC: 139 MEQ/L (ref 135–144)
TOTAL PROTEIN: 7 G/DL (ref 6.3–8)
TRIGL SERPL-MCNC: 143 MG/DL (ref 0–150)

## 2021-03-11 ENCOUNTER — OFFICE VISIT (OUTPATIENT)
Dept: CARDIOLOGY CLINIC | Age: 81
End: 2021-03-11
Payer: MEDICARE

## 2021-03-11 VITALS
DIASTOLIC BLOOD PRESSURE: 66 MMHG | SYSTOLIC BLOOD PRESSURE: 123 MMHG | WEIGHT: 203 LBS | HEIGHT: 62 IN | BODY MASS INDEX: 37.36 KG/M2 | RESPIRATION RATE: 16 BRPM | HEART RATE: 67 BPM | OXYGEN SATURATION: 98 %

## 2021-03-11 DIAGNOSIS — E78.2 MIXED HYPERLIPIDEMIA: ICD-10-CM

## 2021-03-11 DIAGNOSIS — I10 HYPERTENSION, UNSPECIFIED TYPE: Primary | ICD-10-CM

## 2021-03-11 DIAGNOSIS — I07.1 TRICUSPID VALVE INSUFFICIENCY, UNSPECIFIED ETIOLOGY: ICD-10-CM

## 2021-03-11 PROCEDURE — 99214 OFFICE O/P EST MOD 30 MIN: CPT | Performed by: INTERNAL MEDICINE

## 2021-03-11 ASSESSMENT — ENCOUNTER SYMPTOMS
WHEEZING: 0
GASTROINTESTINAL NEGATIVE: 1
EYES NEGATIVE: 1
COUGH: 0
BLOOD IN STOOL: 0
CHEST TIGHTNESS: 0
STRIDOR: 0
NAUSEA: 0

## 2021-03-11 NOTE — PROGRESS NOTES
Subsequent Progress Note  Patient: Georgie Temple  YOB: 1940  MRN: [id number]    Chief Complaint: alejandre  htn palp polycythemia LAKSHMI  Chief Complaint   Patient presents with    6 Month Follow-Up    Hypertension    Hyperlipidemia       CV Data:  10/2017 SPECT negative  10/2017 ECHO 60%       Subjective/HPI: no cp +d no falls no bleed eats well. occ palp     9/27/2019: had 2U Phlebotomy 2 mo ago. This made her feel better. No cp no sob no falls no bleed. 3/6/2020 still getting Phlebotomy  No cp no sob no falls no bleed. 9/11/2020 no cp no sob no falls no bleed. Uses CPAP.     3/11/21 no cp no sob no falls no bleed sleeps welll w CPAP.      EKG: SR 71    Past Medical History:   Diagnosis Date    Bladder prolapse, female, acquired     Chest pain 10/14/2017    Degenerative arthritis of cervical spine 6/29/2012    Diverticulosis     ALEJANDRE (dyspnea on exertion) 10/14/2017    ELIZABETH (generalized anxiety disorder)     Generalized osteoarthritis of multiple sites     knees and neck    Heart palpitations 11/13/2017    History of bone density study 09/2014    Hyperlipidemia 6/29/2012    Hypertension 6/29/2012    Hypothyroidism 6/29/2012    Internal hemorrhoid     Osteopenia 09/2014    Seborrheic dermatitis of scalp        Past Surgical History:   Procedure Laterality Date    ARTHROSCOPY / ARTHROTOMY KNEE Right 10/14/2016    RIGHT KNEE ARTHROSCOPY / LOOSE BODY / PAT ON ADMIT performed by Nissa Mistry MD at [address]  11/2015    CARPAL TUNNEL RELEASE Right     CATARACT REMOVAL WITH IMPLANT Bilateral     COLONOSCOPY  12/15/05    CYSTOCELE REPAIR      HYSTERECTOMY      RECTOCELE REPAIR         Family History   Problem Relation Age of Onset    Cancer Mother         Liposarcoma    Heart Disease Father         Heart failure, Myelodysplasia    Cancer Brother         Kidney cancer    Diabetes type 2  Maternal Grandmother     Heart Disease Maternal Grandfather     Cancer Paternal Grandmother         Bone cancer    Diabetes Other         parkinson's disease       Social History     Socioeconomic History    Marital status:      Spouse name: None    Number of children: 2    Years of education: None    Highest education level: None   Occupational History    Occupation:    Social Needs    Financial resource strain: Not hard at all   [place]-Shila insecurity     Worry: Never true     Inability: Never true    Transportation needs     Medical: No     Non-medical: No   Tobacco Use    Smoking status: Former Smoker     Packs/day: 0.50     Years: 15.00     Pack years: 7.50     Types: Cigarettes     Quit date: 1972     Years since quittin.5    Smokeless tobacco: Never Used   Substance and Sexual Activity    Alcohol use: No    Drug use: No    Sexual activity: None   Lifestyle    Physical activity     Days per week: None     Minutes per session: None    Stress: None   Relationships    Social connections     Talks on phone: None     Gets together: None     Attends [employer]ism service: None     Active member of club or organization: None     Attends meetings of clubs or organizations: None     Relationship status: None    Intimate partner violence     Fear of current or ex partner: None     Emotionally abused: None     Physically abused: None     Forced sexual activity: None   Other Topics Concern    None   Social History Narrative    Has one son and one daughter.  is a few years older and now has dementia and is on aricept.         No Known Allergies    Current Outpatient Medications   Medication Sig Dispense Refill    levothyroxine (SYNTHROID) 50 MCG tablet TAKE 1 TABLET BY MOUTH ON  MONDAY, WEDNESDAY AND  FRIDAY ALTERNATING WITH THE 75MCG 39 tablet 3    levothyroxine (SYNTHROID) 75 MCG tablet TAKE 1 TABLET BY MOUTH ON  , TUESDAY, THURSDAY  AND SATURDAY 52 tablet 3    celecoxib (CELEBREX) 200 MG capsule TAKE 1 CAPSULE DAILY 90 capsule 3    metoprolol succinate (TOPROL XL) 50 MG extended release tablet TAKE 1 TABLET TWICE A  tablet 3    pravastatin (PRAVACHOL) 40 MG tablet Take 1 tablet by mouth nightly 90 tablet 3    busPIRone (BUSPAR) 5 MG tablet Take 1 tablet by mouth 2 times daily as needed (anxiety) 60 tablet 0    Respiratory Therapy Supplies RISSA New CPAP nasal  mask and supplies 1 Device 0    CPAP Machine MISC by Does not apply route Auto CPAP 5-10 cm of H2O 1 each 0    calcium carbonate 600 MG TABS tablet Take 1 tablet by mouth daily      vitamin D (CHOLECALCIFEROL) 1000 UNIT TABS tablet Take 1,000 Units by mouth daily       No current facility-administered medications for this visit. Review of Systems:   Review of Systems   Constitutional: Negative. Negative for diaphoresis and fatigue. HENT: Negative. Eyes: Negative. Respiratory: Negative for cough, chest tightness, wheezing and stridor. Cardiovascular: Negative for chest pain and leg swelling. Gastrointestinal: Negative. Negative for blood in stool and nausea. Genitourinary: Negative. Musculoskeletal: Negative. Skin: Negative. Neurological: Negative. Negative for dizziness, syncope, weakness and light-headedness. Hematological: Negative. Psychiatric/Behavioral: Negative. Physical Examination:    /66 (Site: Left Upper Arm, Position: Sitting, Cuff Size: Medium Adult)   Pulse 67   Resp 16   Ht 5' 2\" (1.575 m)   Wt 203 lb (92.1 kg)   LMP  (LMP Unknown)   SpO2 98%   BMI 37.13 kg/m²    Physical Exam   Constitutional: She appears healthy. No distress. HENT:   Normal cephalic and Atraumatic   Eyes: Pupils are equal, round, and reactive to light. Neck: Normal range of motion and thyroid normal. Neck supple. No JVD present. No neck adenopathy. No thyromegaly present. Cardiovascular: Normal rate, regular rhythm, normal heart sounds, intact distal pulses and normal pulses.    Pulmonary/Chest: Effort normal and breath sounds normal. She has no wheezes. She has no rales. She exhibits no tenderness. Abdominal: Soft. Bowel sounds are normal. There is no abdominal tenderness. Musculoskeletal: Normal range of motion. General: No tenderness or edema. Neurological: She is alert and oriented to person, place, and time. Skin: Skin is warm. No cyanosis. Nails show no clubbing.        LABS:  CBC:   Lab Results   Component Value Date    WBC 4.8 05/10/2019    RBC 5.43 05/10/2019    HGB 17.0 05/10/2019    HCT 50.1 05/10/2019    MCV 92.3 05/10/2019    MCH 31.4 05/10/2019    MCHC 34.0 05/10/2019    RDW 13.7 05/10/2019     05/10/2019     Lipids:  Lab Results   Component Value Date    CHOL 161 03/02/2021    CHOL 187 01/30/2020    CHOL 188 09/25/2018     Lab Results   Component Value Date    TRIG 143 03/02/2021    TRIG 223 (H) 01/30/2020    TRIG 178 09/25/2018     Lab Results   Component Value Date    HDL 52 03/02/2021    HDL 50 01/30/2020    HDL 51 09/25/2018     Lab Results   Component Value Date    LDLCALC 80 03/02/2021    LDLCALC 92 01/30/2020    LDLCALC 101 09/25/2018     No results found for: LABVLDL, VLDL  Lab Results   Component Value Date    CHOLHDLRATIO 2.9 03/14/2013    CHOLHDLRATIO 2.8 10/01/2012    CHOLHDLRATIO 4.2 07/02/2012     CMP:    Lab Results   Component Value Date     03/02/2021    K 4.8 03/02/2021     03/02/2021    CO2 23 03/02/2021    BUN 19 03/02/2021    CREATININE 0.68 03/02/2021    GFRAA >60.0 03/02/2021    LABGLOM >60.0 03/02/2021    GLUCOSE 86 03/02/2021    PROT 7.0 03/02/2021    LABALBU 3.9 03/02/2021    CALCIUM 9.0 03/02/2021    BILITOT 0.6 03/02/2021    ALKPHOS 80 03/02/2021    AST <5 03/02/2021    ALT <5 03/02/2021     BMP:    Lab Results   Component Value Date     03/02/2021    K 4.8 03/02/2021     03/02/2021    CO2 23 03/02/2021    BUN 19 03/02/2021    LABALBU 3.9 03/02/2021    CREATININE 0.68 03/02/2021    CALCIUM 9.0 03/02/2021    GFRAA >60.0 03/02/2021    LABGLOM >60.0 03/02/2021    GLUCOSE 86 03/02/2021     Magnesium:    Lab Results   Component Value Date    MG 2.0 10/14/2017     TSH:  Lab Results   Component Value Date    TSH 2.770 09/25/2018       Patient Active Problem List   Diagnosis    Hypertension    Hyperlipidemia    Hypothyroidism    Degenerative arthritis of cervical spine    Osteoarthritis of knees, bilateral    Seborrheic dermatitis of scalp    Generalized osteoarthritis of multiple sites    ELIZABETH (generalized anxiety disorder)    Elevated hemoglobin (HCC)    Heart palpitations    Mixed hyperlipidemia    ALEJANDRE (dyspnea on exertion)    Sleep apnea    Obesity (BMI 30-39. 9)    Polycythemia    Morbidly obese (HCC)       There are no discontinued medications. Modified Medications    No medications on file       No orders of the defined types were placed in this encounter. Assessment/Plan:    1. Essential hypertension   stable     2. Heart palpitations   stable - occasion. Much improved. 3. Mixed hyperlipidemia   statin     4. ALEJANDRE (dyspnea on exertion)  Stable. 5.  Polycythemia -  see Hematology      6. LAKSHMI- CPAP     7. TR - repeat Echo   Counseling:  Heart Healthy Lifestyle, Low Salt Diet, Take Precautions to Prevent Falls and Walk Daily    Return in about 6 months (around 9/11/2021).       Electronically signed by Aletha Aleman MD on 3/11/2021 at 4:12 PM

## 2021-03-16 ENCOUNTER — TELEPHONE (OUTPATIENT)
Dept: FAMILY MEDICINE CLINIC | Age: 81
End: 2021-03-16

## 2021-03-29 DIAGNOSIS — E78.2 MIXED HYPERLIPIDEMIA: ICD-10-CM

## 2021-03-29 RX ORDER — PRAVASTATIN SODIUM 40 MG
TABLET ORAL
Qty: 90 TABLET | Refills: 3 | Status: SHIPPED | OUTPATIENT
Start: 2021-03-29 | End: 2022-03-24

## 2021-04-07 ENCOUNTER — HOSPITAL ENCOUNTER (OUTPATIENT)
Dept: NON INVASIVE DIAGNOSTICS | Age: 81
Discharge: HOME | End: 2021-04-07
Payer: MEDICARE

## 2021-04-07 DIAGNOSIS — I07.1 TRICUSPID VALVE INSUFFICIENCY, UNSPECIFIED ETIOLOGY: ICD-10-CM

## 2021-04-07 DIAGNOSIS — I10 HYPERTENSION, UNSPECIFIED TYPE: ICD-10-CM

## 2021-04-07 LAB
LV EF: 55 %
LVEF MODALITY: NORMAL

## 2021-04-07 PROCEDURE — 93306 TTE W/DOPPLER COMPLETE: CPT

## 2021-05-13 ENCOUNTER — OFFICE VISIT (OUTPATIENT)
Dept: PULMONOLOGY | Age: 81
End: 2021-05-13
Payer: MEDICARE

## 2021-05-13 VITALS
HEIGHT: 62 IN | TEMPERATURE: 98.5 F | WEIGHT: 200 LBS | SYSTOLIC BLOOD PRESSURE: 130 MMHG | OXYGEN SATURATION: 99 % | DIASTOLIC BLOOD PRESSURE: 77 MMHG | BODY MASS INDEX: 36.8 KG/M2 | HEART RATE: 68 BPM

## 2021-05-13 DIAGNOSIS — G47.30 SLEEP APNEA, UNSPECIFIED TYPE: Primary | ICD-10-CM

## 2021-05-13 DIAGNOSIS — D75.1 POLYCYTHEMIA: ICD-10-CM

## 2021-05-13 DIAGNOSIS — I27.20 PULMONARY HYPERTENSION (HCC): ICD-10-CM

## 2021-05-13 DIAGNOSIS — E66.9 OBESITY (BMI 30-39.9): ICD-10-CM

## 2021-05-13 PROCEDURE — 99214 OFFICE O/P EST MOD 30 MIN: CPT | Performed by: INTERNAL MEDICINE

## 2021-05-13 ASSESSMENT — ENCOUNTER SYMPTOMS
NAUSEA: 0
COUGH: 0
DIARRHEA: 0
TROUBLE SWALLOWING: 0
WHEEZING: 0
RHINORRHEA: 0
VOMITING: 0
SORE THROAT: 0
EYE ITCHING: 0
SINUS PRESSURE: 0
EYE DISCHARGE: 0
SHORTNESS OF BREATH: 0
CHEST TIGHTNESS: 0
VOICE CHANGE: 0
ABDOMINAL PAIN: 0

## 2021-05-13 NOTE — PROGRESS NOTES
Subjective:     Melita Cordon is a 80 y.o. female who complains today of:     Chief Complaint   Patient presents with    Sleep Apnea     f/u       HPI  She is using Auto CPAP with  5-10   centimeters of H2O with heated humidity. She is using CPAP for about   7-8  hours every night. She is using CPAP with  Nasal  Mask. She said  sleep is restful with the CPAP use. She is compliant with CPAP therapy and benefiting with CPAP use. No snoring with CPAP use. No complaint of daytime sleepiness or tiredness with CPAP use. She denies taking naps. No sleepiness with driving. She denies difficulty falling asleep or staying asleep. Allergies:  Patient has no known allergies.   Past Medical History:   Diagnosis Date    Bladder prolapse, female, acquired     Chest pain 10/14/2017    Degenerative arthritis of cervical spine 6/29/2012    Diverticulosis     ALEJANDRE (dyspnea on exertion) 10/14/2017    ELIZABETH (generalized anxiety disorder)     Generalized osteoarthritis of multiple sites     knees and neck    Heart palpitations 11/13/2017    History of bone density study 09/2014    Hyperlipidemia 6/29/2012    Hypertension 6/29/2012    Hypothyroidism 6/29/2012    Internal hemorrhoid     Osteopenia 09/2014    Seborrheic dermatitis of scalp      Past Surgical History:   Procedure Laterality Date    ARTHROSCOPY / ARTHROTOMY KNEE Right 10/14/2016    RIGHT KNEE ARTHROSCOPY / LOOSE BODY / PAT ON ADMIT performed by Lyubov Mclaughlin MD at [facility]  11/2015    CARPAL TUNNEL RELEASE Right     CATARACT REMOVAL WITH IMPLANT Bilateral     COLONOSCOPY  12/15/05    CYSTOCELE REPAIR      HYSTERECTOMY      RECTOCELE REPAIR       Family History   Problem Relation Age of Onset    Cancer Mother         Liposarcoma    Heart Disease Father         Heart failure, Myelodysplasia    Cancer Brother         Kidney cancer    Diabetes type 2  Maternal Grandmother     Heart Disease Maternal Grandfather  Cancer Paternal Grandmother         Bone cancer    Diabetes Other         parkinson's disease     Social History     Socioeconomic History    Marital status:      Spouse name: Not on file    Number of children: 2    Years of education: Not on file    Highest education level: Not on file   Occupational History    Occupation:    Social Needs    Financial resource strain: Not hard at all   [place]-Shila insecurity     Worry: Never true     Inability: Never true    Transportation needs     Medical: No     Non-medical: No   Tobacco Use    Smoking status: Former Smoker     Packs/day: 0.50     Years: 15.00     Pack years: 7.50     Types: Cigarettes     Quit date: 1972     Years since quittin.7    Smokeless tobacco: Never Used   Substance and Sexual Activity    Alcohol use: No    Drug use: No    Sexual activity: Not on file   Lifestyle    Physical activity     Days per week: Not on file     Minutes per session: Not on file    Stress: Not on file   Relationships    Social connections     Talks on phone: Not on file     Gets together: Not on file     Attends Muslim service: Not on file     Active member of club or organization: Not on file     Attends meetings of clubs or organizations: Not on file     Relationship status: Not on file    Intimate partner violence     Fear of current or ex partner: Not on file     Emotionally abused: Not on file     Physically abused: Not on file     Forced sexual activity: Not on file   Other Topics Concern    Not on file   Social History Narrative    Has one son and one daughter.  is a few years older and now has dementia and is on aricept. Review of Systems   Constitutional: Negative for chills, diaphoresis, fatigue and fever. HENT: Negative for congestion, mouth sores, nosebleeds, postnasal drip, rhinorrhea, sinus pressure, sneezing, sore throat, trouble swallowing and voice change.     Eyes: Negative for discharge, itching and visual disturbance. Respiratory: Negative for cough, chest tightness, shortness of breath and wheezing. Cardiovascular: Negative for chest pain, palpitations and leg swelling. Gastrointestinal: Negative for abdominal pain, diarrhea, nausea and vomiting. Genitourinary: Negative for difficulty urinating and hematuria. Musculoskeletal: Negative for arthralgias, joint swelling and myalgias. Skin: Negative for rash. Allergic/Immunologic: Negative for environmental allergies and food allergies. Neurological: Negative for dizziness, tremors, weakness and headaches. Psychiatric/Behavioral: Positive for sleep disturbance. Negative for behavioral problems. :     Vitals:    05/13/21 1027   BP: 130/77   Pulse: 68   Temp: 98.5 °F (36.9 °C)   SpO2: 99%   Weight: 200 lb (90.7 kg)   Height: 5' 2\" (1.575 m)     Wt Readings from Last 3 Encounters:   05/13/21 200 lb (90.7 kg)   03/11/21 203 lb (92.1 kg)   02/02/21 205 lb (93 kg)         Physical Exam  Constitutional:       General: She is not in acute distress. Appearance: She is well-developed. She is not diaphoretic. HENT:      Head: Normocephalic and atraumatic. Nose: Nose normal.   Eyes:      Pupils: Pupils are equal, round, and reactive to light. Neck:      Thyroid: No thyromegaly. Vascular: No JVD. Trachea: No tracheal deviation. Cardiovascular:      Rate and Rhythm: Normal rate and regular rhythm. Heart sounds: No murmur. No friction rub. No gallop. Pulmonary:      Effort: No respiratory distress. Breath sounds: No wheezing or rales. Chest:      Chest wall: No tenderness. Abdominal:      General: There is no distension. Tenderness: There is no abdominal tenderness. There is no rebound. Musculoskeletal: Normal range of motion. Lymphadenopathy:      Cervical: No cervical adenopathy. Skin:     General: Skin is warm and dry.    Neurological:      Mental Status: She is alert and oriented to person, place, and time. Coordination: Coordination normal.         Current Outpatient Medications   Medication Sig Dispense Refill    Respiratory Therapy Supplies RISSA New CPAP mask and supplies 1 Device 0    pravastatin (PRAVACHOL) 40 MG tablet TAKE 1 TABLET NIGHTLY 90 tablet 3    levothyroxine (SYNTHROID) 50 MCG tablet TAKE 1 TABLET BY MOUTH ON  MONDAY, WEDNESDAY AND  FRIDAY ALTERNATING WITH THE 75MCG 39 tablet 3    levothyroxine (SYNTHROID) 75 MCG tablet TAKE 1 TABLET BY MOUTH ON  SUNDAY, TUESDAY, THURSDAY  AND SATURDAY 52 tablet 3    celecoxib (CELEBREX) 200 MG capsule TAKE 1 CAPSULE DAILY 90 capsule 3    metoprolol succinate (TOPROL XL) 50 MG extended release tablet TAKE 1 TABLET TWICE A  tablet 3    busPIRone (BUSPAR) 5 MG tablet Take 1 tablet by mouth 2 times daily as needed (anxiety) 60 tablet 0    Respiratory Therapy Supplies RISSA New CPAP nasal  mask and supplies 1 Device 0    CPAP Machine MISC by Does not apply route Auto CPAP 5-10 cm of H2O 1 each 0    calcium carbonate 600 MG TABS tablet Take 1 tablet by mouth daily      vitamin D (CHOLECALCIFEROL) 1000 UNIT TABS tablet Take 1,000 Units by mouth daily       No current facility-administered medications for this visit. No results found for this or any previous visit.]  Results for orders placed during the hospital encounter of 10/13/17   XR Chest Portable    Narrative EXAMINATION: XR CHEST PORTABLE    REASON FOR EXAM: chest pain     FINDINGS: Heart is mildly enlarged and there is atherosclerotic changes in the aorta. Pulmonary vasculature within normal limits. Mild increased prominence of the interstitium in the lower lung fields noted but no consolidating pneumonia or evidence of   acute pulmonary edema. Bones and soft tissues intact. Impression CARDIOMEGALY. MILD INTERSTITIAL CHANGES AT THE BASES. NO ACUTE PROCESS IN THE LUNG FIELDS SUSPECTED. Assessment/Plan:     1.  Sleep apnea, unspecified type  She is using Auto CPAP with  5-10   centimeters of H2O with heated humidity. She is using CPAP for about  7-8  hours every night. She is using CPAP with  Nasal  Mask. She said  sleep is restful with the CPAP use. She is compliant with CPAP therapy and benefiting with CPAP use. No snoring with CPAP use. Continue CPAP therapy as before    Counseling: CPAP/BiPAP uses, She advised to use CPAP at least 5-6 hours every night. Driving: She is advised for extreme caution when driving or operating machinery if there is a feeling of drowsiness, especially while driving it is preferable to stop driving and take a brief nap. Sleep hygiene:Avoid supine sleep, sleep on  sides. Avoid  sleep deprivation. Explained sleep hygiene. Advice to avoid Alcohol and sedative    2. Obesity (BMI 30-39. 9)  She is advised try to lose weight. obesity related risk explained to the patient ,  Current weight:  200 lb (90.7 kg) Lbs. BMI:  Body mass index is 36.58 kg/m². Suggested weight control approaches, including dietary changes , exercise, behavioral modification. 3. Polycythemia  Following with dr. Maritza Holbrook and seeing him every 3 month , blood work every month     4. Pulmonary hypertension ([facility] [address]  She had recently echo done in April 2021 showing mildly elevated pulmonary pressure with RVSP of 36 mmHg      Return in about 6 months (around 11/13/2021) for olena.       Tomer Lane MD

## 2021-06-11 ENCOUNTER — TELEPHONE (OUTPATIENT)
Dept: FAMILY MEDICINE CLINIC | Age: 81
End: 2021-06-11

## 2021-08-03 ENCOUNTER — OFFICE VISIT (OUTPATIENT)
Dept: FAMILY MEDICINE CLINIC | Age: 81
End: 2021-08-03
Payer: MEDICARE

## 2021-08-03 VITALS
DIASTOLIC BLOOD PRESSURE: 78 MMHG | SYSTOLIC BLOOD PRESSURE: 120 MMHG | OXYGEN SATURATION: 98 % | HEART RATE: 58 BPM | BODY MASS INDEX: 37.65 KG/M2 | TEMPERATURE: 98.1 F | WEIGHT: 204.6 LBS | HEIGHT: 62 IN

## 2021-08-03 DIAGNOSIS — E03.9 ACQUIRED HYPOTHYROIDISM: ICD-10-CM

## 2021-08-03 DIAGNOSIS — I10 HYPERTENSION, UNSPECIFIED TYPE: ICD-10-CM

## 2021-08-03 DIAGNOSIS — R00.2 PALPITATIONS: Primary | ICD-10-CM

## 2021-08-03 DIAGNOSIS — J44.9 CHRONIC OBSTRUCTIVE PULMONARY DISEASE, UNSPECIFIED COPD TYPE (HCC): ICD-10-CM

## 2021-08-03 DIAGNOSIS — E66.01 SEVERE OBESITY (BMI 35.0-35.9 WITH COMORBIDITY) (HCC): ICD-10-CM

## 2021-08-03 PROCEDURE — 99214 OFFICE O/P EST MOD 30 MIN: CPT | Performed by: NURSE PRACTITIONER

## 2021-08-03 PROCEDURE — 93000 ELECTROCARDIOGRAM COMPLETE: CPT | Performed by: NURSE PRACTITIONER

## 2021-08-03 ASSESSMENT — ENCOUNTER SYMPTOMS
TROUBLE SWALLOWING: 0
CONSTIPATION: 0
DIARRHEA: 0
COLOR CHANGE: 0
EYE PAIN: 0
BACK PAIN: 0
SHORTNESS OF BREATH: 1
COUGH: 0
CHEST TIGHTNESS: 0
ABDOMINAL PAIN: 0

## 2021-08-03 NOTE — PROGRESS NOTES
Worried About [address] [employer] in the Last Year: Never true    Noam of Food in the Last Year: Never true   Transportation Needs: No Transportation Needs    Lack of Transportation (Medical): No    Lack of Transportation (Non-Medical):  No   Physical Activity:     Days of Exercise per Week:     Minutes of Exercise per Session:    Stress:     Feeling of Stress :    Social Connections:     Frequency of Communication with Friends and Family:     Frequency of Social Gatherings with Friends and Family:     Attends Mormon Services:     Active Member of Clubs or Organizations:     Attends Club or Organization Meetings:     Marital Status:    Intimate Partner Violence:     Fear of Current or Ex-Partner:     Emotionally Abused:     Physically Abused:     Sexually Abused:      Current Outpatient Medications on File Prior to Visit   Medication Sig Dispense Refill    metoprolol succinate (TOPROL XL) 50 MG extended release tablet TAKE 1 TABLET TWICE A  tablet 2    Respiratory Therapy Supplies RISSA New CPAP mask and supplies 1 Device 0    pravastatin (PRAVACHOL) 40 MG tablet TAKE 1 TABLET NIGHTLY 90 tablet 3    levothyroxine (SYNTHROID) 50 MCG tablet TAKE 1 TABLET BY MOUTH ON  MONDAY, WEDNESDAY AND  FRIDAY ALTERNATING WITH THE 75MCG 39 tablet 3    levothyroxine (SYNTHROID) 75 MCG tablet TAKE 1 TABLET BY MOUTH ON  SUNDAY, TUESDAY, THURSDAY  AND SATURDAY 52 tablet 3    celecoxib (CELEBREX) 200 MG capsule TAKE 1 CAPSULE DAILY 90 capsule 3    busPIRone (BUSPAR) 5 MG tablet Take 1 tablet by mouth 2 times daily as needed (anxiety) 60 tablet 0    Respiratory Therapy Supplies RISSA New CPAP nasal  mask and supplies 1 Device 0    CPAP Machine MISC by Does not apply route Auto CPAP 5-10 cm of H2O 1 each 0    calcium carbonate 600 MG TABS tablet Take 1 tablet by mouth daily      vitamin D (CHOLECALCIFEROL) 1000 UNIT TABS tablet Take 1,000 Units by mouth daily       No current facility-administered medications on file prior to visit. No Known Allergies    Review of Systems   Constitutional: Negative for activity change, appetite change, chills, diaphoresis, fatigue and fever. HENT: Negative for congestion, ear pain, hearing loss and trouble swallowing. Eyes: Negative for pain and visual disturbance. Respiratory: Positive for shortness of breath. Negative for cough and chest tightness. Cardiovascular: Positive for palpitations. Negative for chest pain and leg swelling. Gastrointestinal: Negative for abdominal pain, constipation and diarrhea. Endocrine: Negative for polydipsia, polyphagia and polyuria. Genitourinary: Negative for dysuria. Musculoskeletal: Negative for arthralgias and back pain. Skin: Negative for color change and rash. Neurological: Positive for numbness. Negative for dizziness and light-headedness. Psychiatric/Behavioral: Positive for dysphoric mood. Negative for behavioral problems and sleep disturbance. The patient is nervous/anxious. Objective  Vitals:    08/03/21 1028   BP: 120/78   Site: Left Upper Arm   Position: Sitting   Cuff Size: Medium Adult   Pulse: 58   Temp: 98.1 °F (36.7 °C)   TempSrc: Tympanic   SpO2: 98%   Weight: 204 lb 9.6 oz (92.8 kg)   Height: 5' 2\" (1.575 m)     Physical Exam  Vitals reviewed. Constitutional:       General: She is not in acute distress. Appearance: Normal appearance. She is well-developed. She is obese. She is not ill-appearing, toxic-appearing or diaphoretic. HENT:      Head: Normocephalic and atraumatic. Right Ear: Hearing, tympanic membrane, ear canal and external ear normal.      Left Ear: Hearing, tympanic membrane, ear canal and external ear normal.      Nose: Nose normal.   Eyes:      General:         Right eye: No discharge. Left eye: No discharge. Conjunctiva/sclera: Conjunctivae normal.      Pupils: Pupils are equal, round, and reactive to light. Neck:      Thyroid: No thyromegaly. Cardiovascular:      Rate and Rhythm: Normal rate and regular rhythm. Pulses: Normal pulses. Heart sounds: Normal heart sounds. No murmur heard. Pulmonary:      Effort: Pulmonary effort is normal. No respiratory distress. Breath sounds: Normal breath sounds. No stridor. No wheezing, rhonchi or rales. Chest:      Chest wall: No tenderness. Musculoskeletal:      Cervical back: Neck supple. No tenderness. Lymphadenopathy:      Cervical: No cervical adenopathy. Skin:     General: Skin is warm and dry. Capillary Refill: Capillary refill takes less than 2 seconds. Coloration: Skin is not pale. Findings: No erythema or rash. Neurological:      General: No focal deficit present. Mental Status: She is alert and oriented to person, place, and time. Mental status is at baseline. Cranial Nerves: No cranial nerve deficit. Coordination: Coordination normal.      Gait: Gait normal.   Psychiatric:         Mood and Affect: Mood normal.         Speech: Speech normal.         Behavior: Behavior normal.         Thought Content: Thought content normal.         Judgment: Judgment normal.      Comments: Tearful at times throughout visit. Assessment& Plan     Diagnosis Orders   1. Palpitations  EKG 12 lead   2. Acquired hypothyroidism     3. Chronic obstructive pulmonary disease, unspecified COPD type ([facility] [address]     4. Severe obesity (BMI 35.0-35.9 with comorbidity) ([facility] [address]     5. Hypertension, unspecified type       EKG today is stable with previous EKG's. Discussed doing holter monitor; however she declines at this time. Will f/u with Dr. Felicia Curtis as scheduled next month, ER if symptoms recur prior to visit with Dr. Felicia Curtis. Continue current regimen. Start buspar daily as opposed to PRN. Discussed referral to ortho for finger numbness; however she declines. Recommend wrist splints daily. F/u in 4 months or sooner PRN.   Side effects, adverse effects of the medication prescribed today, as well as treatment plan/ rationale and result expectations have been discussed with the patient who expresses understanding and desires to proceed. Close follow up to evaluate treatment results and for coordination of care. I have reviewed the patient's medical history in detail and updated the computerized patient record. As always, patient is advised that if symptoms worsen in any way they will proceed to the nearest emergency room. Orders Placed This Encounter   Procedures    EKG 12 lead     Order Specific Question:   Reason for Exam?     Answer:   Irregular heart rate       No orders of the defined types were placed in this encounter. Return in about 4 months (around 12/3/2021) for htn.     Girma Cook, LYNDA - CNP

## 2021-09-14 ENCOUNTER — OFFICE VISIT (OUTPATIENT)
Dept: CARDIOLOGY CLINIC | Age: 81
End: 2021-09-14
Payer: MEDICARE

## 2021-09-14 VITALS
SYSTOLIC BLOOD PRESSURE: 134 MMHG | OXYGEN SATURATION: 98 % | RESPIRATION RATE: 16 BRPM | HEIGHT: 62 IN | HEART RATE: 63 BPM | BODY MASS INDEX: 37.36 KG/M2 | DIASTOLIC BLOOD PRESSURE: 82 MMHG | WEIGHT: 203 LBS

## 2021-09-14 DIAGNOSIS — I10 HYPERTENSION, UNSPECIFIED TYPE: ICD-10-CM

## 2021-09-14 DIAGNOSIS — E78.2 MIXED HYPERLIPIDEMIA: ICD-10-CM

## 2021-09-14 DIAGNOSIS — R00.2 PALPITATION: ICD-10-CM

## 2021-09-14 DIAGNOSIS — I07.1 TRICUSPID VALVE INSUFFICIENCY, UNSPECIFIED ETIOLOGY: ICD-10-CM

## 2021-09-14 DIAGNOSIS — F41.1 GAD (GENERALIZED ANXIETY DISORDER): ICD-10-CM

## 2021-09-14 DIAGNOSIS — I10 ESSENTIAL HYPERTENSION: Primary | ICD-10-CM

## 2021-09-14 PROCEDURE — 99214 OFFICE O/P EST MOD 30 MIN: CPT | Performed by: INTERNAL MEDICINE

## 2021-09-14 RX ORDER — BUSPIRONE HYDROCHLORIDE 5 MG/1
5 TABLET ORAL 2 TIMES DAILY PRN
Qty: 180 TABLET | Refills: 0 | Status: SHIPPED | OUTPATIENT
Start: 2021-09-14 | End: 2021-11-30

## 2021-09-14 ASSESSMENT — ENCOUNTER SYMPTOMS
EYES NEGATIVE: 1
WHEEZING: 0
CHEST TIGHTNESS: 0
BLOOD IN STOOL: 0
STRIDOR: 0
COUGH: 0
GASTROINTESTINAL NEGATIVE: 1
NAUSEA: 0

## 2021-09-14 NOTE — PROGRESS NOTES
Subsequent Progress Note  Patient: Heath Mckeon  YOB: 1940  MRN: [id number]    Chief Complaint: bird  htn palp polycythemia LAKSHMI  Chief Complaint   Patient presents with    6 Month Follow-Up    Hypertension    Hyperlipidemia       CV Data:  10/2017 SPECT negative  10/2017 ECHO 60%  4/21 Echo EF 55       Subjective/HPI: no cp +d no falls no bleed eats well. occ palp     9/27/2019: had 2U Phlebotomy 2 mo ago. This made her feel better. No cp no sob no falls no bleed. 3/6/2020 still getting Phlebotomy  No cp no sob no falls no bleed. 9/11/2020 no cp no sob no falls no bleed. Uses CPAP.     3/11/21 no cp no sob no falls no bleed sleeps welll w CPAP. 9/14/21 recently under stress due to 's demntia. At that time had frequwnt palps but resolved.      EKG: SR 71    Past Medical History:   Diagnosis Date    Bladder prolapse, female, acquired     Chest pain 10/14/2017    Degenerative arthritis of cervical spine 6/29/2012    Diverticulosis     BIRD (dyspnea on exertion) 10/14/2017    ELIZABETH (generalized anxiety disorder)     Generalized osteoarthritis of multiple sites     knees and neck    Heart palpitations 11/13/2017    History of bone density study 09/2014    Hyperlipidemia 6/29/2012    Hypertension 6/29/2012    Hypothyroidism 6/29/2012    Internal hemorrhoid     Osteopenia 09/2014    Seborrheic dermatitis of scalp        Past Surgical History:   Procedure Laterality Date    ARTHROSCOPY / ARTHROTOMY KNEE Right 10/14/2016    RIGHT KNEE ARTHROSCOPY / LOOSE BODY / PAT ON ADMIT performed by Otilio Donis MD at [address] [facility]  11/2015    CARPAL TUNNEL RELEASE Right     CATARACT REMOVAL WITH IMPLANT Bilateral     COLONOSCOPY  12/15/05    CYSTOCELE REPAIR      HYSTERECTOMY      RECTOCELE REPAIR         Family History   Problem Relation Age of Onset    Cancer Mother         Liposarcoma    Heart Disease Father         Heart failure, Myelodysplasia    Cancer Brother         Kidney cancer    Diabetes type 2  Maternal Grandmother     Heart Disease Maternal Grandfather     Cancer Paternal Grandmother         Bone cancer    Diabetes Other         parkinson's disease       Social History     Socioeconomic History    Marital status:      Spouse name: None    Number of children: 2    Years of education: None    Highest education level: None   Occupational History    Occupation:    Tobacco Use    Smoking status: Former Smoker     Packs/day: 0.50     Years: 15.00     Pack years: 7.50     Types: Cigarettes     Quit date: 1972     Years since quittin.0    Smokeless tobacco: Never Used   Substance and Sexual Activity    Alcohol use: No    Drug use: No    Sexual activity: None   Other Topics Concern    None   Social History Narrative    Has one son and one daughter.  is a few years older and now has dementia and is on aricept. Social Determinants of Health     Financial Resource Strain: Low Risk     Difficulty of Paying Living Expenses: Not hard at all   Food Insecurity: No Food Insecurity    Worried About Running Out of Food in the Last Year: Never true    Noam of Food in the Last Year: Never true   Transportation Needs: No Transportation Needs    Lack of Transportation (Medical): No    Lack of Transportation (Non-Medical):  No   Physical Activity:     Days of Exercise per Week:     Minutes of Exercise per Session:    Stress:     Feeling of Stress :    Social Connections:     Frequency of Communication with Friends and Family:     Frequency of Social Gatherings with Friends and Family:     Attends Gnosticism Services:     Active Member of Clubs or Organizations:     Attends Club or Organization Meetings:     Marital Status:    Intimate Partner Violence:     Fear of Current or Ex-Partner:     Emotionally Abused:     Physically Abused:     Sexually Abused:        No Known Allergies    Current Outpatient Medications   Medication Sig Dispense Refill    MAGNESIUM PO Take by mouth OTC      metoprolol succinate (TOPROL XL) 50 MG extended release tablet TAKE 1 TABLET TWICE A  tablet 2    Respiratory Therapy Supplies RISSA New CPAP mask and supplies 1 Device 0    pravastatin (PRAVACHOL) 40 MG tablet TAKE 1 TABLET NIGHTLY 90 tablet 3    levothyroxine (SYNTHROID) 50 MCG tablet TAKE 1 TABLET BY MOUTH ON  MONDAY, WEDNESDAY AND  FRIDAY ALTERNATING WITH THE 75MCG 39 tablet 3    levothyroxine (SYNTHROID) 75 MCG tablet TAKE 1 TABLET BY MOUTH ON  SUNDAY, TUESDAY, THURSDAY  AND SATURDAY 52 tablet 3    celecoxib (CELEBREX) 200 MG capsule TAKE 1 CAPSULE DAILY 90 capsule 3    busPIRone (BUSPAR) 5 MG tablet Take 1 tablet by mouth 2 times daily as needed (anxiety) 60 tablet 0    Respiratory Therapy Supplies RISSA New CPAP nasal  mask and supplies 1 Device 0    CPAP Machine MISC by Does not apply route Auto CPAP 5-10 cm of H2O 1 each 0    calcium carbonate 600 MG TABS tablet Take 1 tablet by mouth daily      vitamin D (CHOLECALCIFEROL) 1000 UNIT TABS tablet Take 1,000 Units by mouth daily       No current facility-administered medications for this visit. Review of Systems:   Review of Systems   Constitutional: Negative. Negative for diaphoresis and fatigue. HENT: Negative. Eyes: Negative. Respiratory: Negative for cough, chest tightness, wheezing and stridor. Cardiovascular: Negative for chest pain and leg swelling. Gastrointestinal: Negative. Negative for blood in stool and nausea. Genitourinary: Negative. Musculoskeletal: Negative. Skin: Negative. Neurological: Negative. Negative for dizziness, syncope, weakness and light-headedness. Hematological: Negative. Psychiatric/Behavioral: Negative.           Physical Examination:    /82 (Site: Right Upper Arm, Position: Sitting, Cuff Size: Large Adult)   Pulse 63   Resp 16   Ht 5' 2\" (1.575 m)   Wt 203 lb (92.1 kg)   LMP  (LMP Unknown)   SpO2 98%   BMI 37.13 kg/m²    Physical Exam   Constitutional: She appears healthy. No distress. HENT:   Normal cephalic and Atraumatic   Eyes: Pupils are equal, round, and reactive to light. Neck: Thyroid normal. No JVD present. No neck adenopathy. No thyromegaly present. Cardiovascular: Normal rate, regular rhythm, normal heart sounds, intact distal pulses and normal pulses. Pulmonary/Chest: Effort normal and breath sounds normal. She has no wheezes. She has no rales. She exhibits no tenderness. Abdominal: Soft. Bowel sounds are normal. There is no abdominal tenderness. Musculoskeletal:         General: No tenderness or edema. Normal range of motion. Cervical back: Normal range of motion and neck supple. Neurological: She is alert and oriented to person, place, and time. Skin: Skin is warm. No cyanosis. Nails show no clubbing.        LABS:  CBC:   Lab Results   Component Value Date    WBC 4.8 05/10/2019    RBC 5.43 05/10/2019    HGB 17.0 05/10/2019    HCT 50.1 05/10/2019    MCV 92.3 05/10/2019    MCH 31.4 05/10/2019    MCHC 34.0 05/10/2019    RDW 13.7 05/10/2019     05/10/2019     Lipids:  Lab Results   Component Value Date    CHOL 161 03/02/2021    CHOL 187 01/30/2020    CHOL 188 09/25/2018     Lab Results   Component Value Date    TRIG 143 03/02/2021    TRIG 223 (H) 01/30/2020    TRIG 178 09/25/2018     Lab Results   Component Value Date    HDL 52 03/02/2021    HDL 50 01/30/2020    HDL 51 09/25/2018     Lab Results   Component Value Date    LDLCALC 80 03/02/2021    LDLCALC 92 01/30/2020    LDLCALC 101 09/25/2018     No results found for: LABVLDL, VLDL  Lab Results   Component Value Date    CHOLHDLRATIO 2.9 03/14/2013    CHOLHDLRATIO 2.8 10/01/2012    CHOLHDLRATIO 4.2 07/02/2012     CMP:    Lab Results   Component Value Date     03/02/2021    K 4.8 03/02/2021     03/02/2021    CO2 23 03/02/2021    BUN 19 03/02/2021    CREATININE 0.68 03/02/2021    GFRAA >60.0 signed by Henrietta Reeves MD on 9/14/2021 at 12:49 PM

## 2021-10-22 ENCOUNTER — NURSE ONLY (OUTPATIENT)
Dept: FAMILY MEDICINE CLINIC | Age: 81
End: 2021-10-22
Payer: MEDICARE

## 2021-10-22 DIAGNOSIS — Z23 FLU VACCINE NEED: Primary | ICD-10-CM

## 2021-10-22 PROCEDURE — 90694 VACC AIIV4 NO PRSRV 0.5ML IM: CPT | Performed by: FAMILY MEDICINE

## 2021-10-22 PROCEDURE — G0008 ADMIN INFLUENZA VIRUS VAC: HCPCS | Performed by: FAMILY MEDICINE

## 2021-10-22 NOTE — PROGRESS NOTES
After obtaining consent, and per orders of Dr. Jennings, injection of High Dose Flu given in Left deltoid by Barber Rader MA. Patient instructed to remain in clinic for 20 minutes afterwards, and to report any adverse reaction to me immediately. Vaccine Information Sheet, \"Influenza - Inactivated\"  given to Kedar Helm, or parent/legal guardian of  Kedar Helm and verbalized understanding. Patient responses:    Have you ever had a reaction to a flu vaccine? No  Are you able to eat eggs without adverse effects? Yes  Do you have any current illness? No  Have you ever had Guillian [place] Syndrome? No    Flu vaccine given per order. Please see immunization tab.

## 2021-11-28 DIAGNOSIS — F41.1 GAD (GENERALIZED ANXIETY DISORDER): ICD-10-CM

## 2021-11-30 RX ORDER — BUSPIRONE HYDROCHLORIDE 5 MG/1
TABLET ORAL
Qty: 180 TABLET | Refills: 3 | Status: SHIPPED | OUTPATIENT
Start: 2021-11-30 | End: 2022-05-17 | Stop reason: SDUPTHER

## 2021-11-30 NOTE — TELEPHONE ENCOUNTER
Requesting medication refill.  Please approve or deny this request.    Rx requested:  Requested Prescriptions     Pending Prescriptions Disp Refills    busPIRone (BUSPAR) 5 MG tablet [Pharmacy Med Name: BUSPIRONE HCL TABS 5MG] 180 tablet 3     Sig: TAKE 1 TABLET TWICE A DAY AS NEEDED FOR ANXIETY       Last Office Visit:   8/3/2021    Next Visit Date:  Future Appointments   Date Time Provider Elzbieta Rivas   2/3/2022 11:00 AM LYNDA Wright CNP [facility]   3/15/2022  1:30 PM Osei Bruno MD [facility]

## 2022-02-03 DIAGNOSIS — E03.9 ACQUIRED HYPOTHYROIDISM: ICD-10-CM

## 2022-02-03 RX ORDER — LEVOTHYROXINE SODIUM 0.07 MG/1
TABLET ORAL
Qty: 52 TABLET | Refills: 3 | Status: SHIPPED | OUTPATIENT
Start: 2022-02-03

## 2022-02-03 RX ORDER — LEVOTHYROXINE SODIUM 0.05 MG/1
TABLET ORAL
Qty: 39 TABLET | Refills: 3 | Status: SHIPPED | OUTPATIENT
Start: 2022-02-03

## 2022-02-03 NOTE — TELEPHONE ENCOUNTER
Future Appointments    Encounter Information    Provider Department Appt Notes   3/15/2022 Gina Rothman MD [facility] 6 months       Recent Visits    09/14/2021 Essential hypertension   [facility] Gina Rothman MD   08/03/2021 [address] Cand[address] Aaron, APRN - CNP

## 2022-02-15 ENCOUNTER — OFFICE VISIT (OUTPATIENT)
Dept: FAMILY MEDICINE CLINIC | Age: 82
End: 2022-02-15
Payer: MEDICARE

## 2022-02-15 VITALS
WEIGHT: 206.4 LBS | OXYGEN SATURATION: 96 % | HEIGHT: 62 IN | DIASTOLIC BLOOD PRESSURE: 78 MMHG | BODY MASS INDEX: 37.98 KG/M2 | SYSTOLIC BLOOD PRESSURE: 146 MMHG | HEART RATE: 76 BPM | TEMPERATURE: 98.2 F

## 2022-02-15 DIAGNOSIS — J44.9 CHRONIC OBSTRUCTIVE PULMONARY DISEASE, UNSPECIFIED COPD TYPE (HCC): ICD-10-CM

## 2022-02-15 DIAGNOSIS — Z13.220 LIPID SCREENING: ICD-10-CM

## 2022-02-15 DIAGNOSIS — E66.01 SEVERE OBESITY (BMI 35.0-39.9) WITH COMORBIDITY (HCC): ICD-10-CM

## 2022-02-15 DIAGNOSIS — I27.20 PULMONARY HYPERTENSION (HCC): Primary | ICD-10-CM

## 2022-02-15 DIAGNOSIS — D45 POLYCYTHEMIA VERA (HCC): ICD-10-CM

## 2022-02-15 DIAGNOSIS — E78.2 MIXED HYPERLIPIDEMIA: ICD-10-CM

## 2022-02-15 DIAGNOSIS — I10 HYPERTENSION, UNSPECIFIED TYPE: ICD-10-CM

## 2022-02-15 DIAGNOSIS — F41.1 GAD (GENERALIZED ANXIETY DISORDER): ICD-10-CM

## 2022-02-15 DIAGNOSIS — E03.9 ACQUIRED HYPOTHYROIDISM: ICD-10-CM

## 2022-02-15 PROCEDURE — 99214 OFFICE O/P EST MOD 30 MIN: CPT | Performed by: NURSE PRACTITIONER

## 2022-02-15 SDOH — ECONOMIC STABILITY: FOOD INSECURITY: WITHIN THE PAST 12 MONTHS, THE FOOD YOU BOUGHT JUST DIDN'T LAST AND YOU DIDN'T HAVE MONEY TO GET MORE.: NEVER TRUE

## 2022-02-15 SDOH — ECONOMIC STABILITY: FOOD INSECURITY: WITHIN THE PAST 12 MONTHS, YOU WORRIED THAT YOUR FOOD WOULD RUN OUT BEFORE YOU GOT MONEY TO BUY MORE.: NEVER TRUE

## 2022-02-15 ASSESSMENT — ENCOUNTER SYMPTOMS
EYE PAIN: 0
ABDOMINAL PAIN: 0
TROUBLE SWALLOWING: 0
SHORTNESS OF BREATH: 0
COUGH: 0
BACK PAIN: 0
CONSTIPATION: 0
CHEST TIGHTNESS: 0
COLOR CHANGE: 0
DIARRHEA: 0

## 2022-02-15 ASSESSMENT — PATIENT HEALTH QUESTIONNAIRE - PHQ9
SUM OF ALL RESPONSES TO PHQ QUESTIONS 1-9: 0
2. FEELING DOWN, DEPRESSED OR HOPELESS: 0
SUM OF ALL RESPONSES TO PHQ9 QUESTIONS 1 & 2: 0
SUM OF ALL RESPONSES TO PHQ QUESTIONS 1-9: 0
1. LITTLE INTEREST OR PLEASURE IN DOING THINGS: 0

## 2022-02-15 ASSESSMENT — COPD QUESTIONNAIRES: COPD: 1

## 2022-02-15 ASSESSMENT — SOCIAL DETERMINANTS OF HEALTH (SDOH): HOW HARD IS IT FOR YOU TO PAY FOR THE VERY BASICS LIKE FOOD, HOUSING, MEDICAL CARE, AND HEATING?: NOT HARD AT ALL

## 2022-02-15 NOTE — PROGRESS NOTES
Subjective  Chief Complaint   Patient presents with    Hypertension     check up    COPD    Hypothyroidism       Hypertension  This is a chronic problem. The current episode started more than 1 year ago. The problem is unchanged. The problem is controlled. Associated symptoms include palpitations. Pertinent negatives include no chest pain, malaise/fatigue, peripheral edema or shortness of breath. Agents associated with hypertension include thyroid hormones. Risk factors for coronary artery disease include obesity, sedentary lifestyle, stress, post-menopausal state and dyslipidemia. Past treatments include beta blockers. The current treatment provides significant improvement. There are no compliance problems. There is no history of CAD/MI, heart failure or PVD. Identifiable causes of hypertension include a thyroid problem. There is no history of chronic renal disease or a hypertension causing med. Palpitations-she is continuing to follow with Dr. Weathers Board a muscle in her right shoulder/neck over the weekend. Taking tylenol, heat which are helping. Anxiety-she is taking the buspar twice daily every day, feels she is doing okay with that. Still with a significant amount of stress related to 's dx of dementia. COPD-doing okay, does get short of breath sometimes. She follows with Dr. Ag Lee every 6 months for LAKSHMI and pulmonary HTN. Hypothyroidism-taking synthroid as prescribed. Polycythemia-follows with Dr. Janel Smith every 3 months.      Past Medical History:   Diagnosis Date    Bladder prolapse, female, acquired     Chest pain 10/14/2017    Degenerative arthritis of cervical spine 6/29/2012    Diverticulosis     ALEJANDRE (dyspnea on exertion) 10/14/2017    ELIZABETH (generalized anxiety disorder)     Generalized osteoarthritis of multiple sites     knees and neck    Heart palpitations 11/13/2017    History of bone density study 09/2014    Hyperlipidemia 6/29/2012    Hypertension 6/29/2012    Hypothyroidism 6/29/2012    Internal hemorrhoid     Osteopenia 09/2014    Seborrheic dermatitis of scalp      Patient Active Problem List    Diagnosis Date Noted    Pulmonary hypertension ([facility] 02/15/2022    Chronic obstructive pulmonary disease, unspecified COPD type ([facility] 08/03/2021    Morbidly obese ([facility] 09/03/2020    Sleep apnea 09/03/2019    Obesity (BMI 30-39.9) 09/03/2019    Polycythemia 09/03/2019    Heart palpitations 05/20/2019    Mixed hyperlipidemia 05/20/2019    ALEJANDRE (dyspnea on exertion) 05/20/2019    Elevated hemoglobin (HCC) 05/10/2019    Generalized osteoarthritis of multiple sites     ELIZABETH (generalized anxiety disorder)     Seborrheic dermatitis of scalp     Osteoarthritis of knees, bilateral     Hypertension 06/29/2012    Hyperlipidemia 06/29/2012    Hypothyroidism 06/29/2012    Degenerative arthritis of cervical spine 06/29/2012     Past Surgical History:   Procedure Laterality Date    ARTHROSCOPY / ARTHROTOMY KNEE Right 10/14/2016    RIGHT KNEE ARTHROSCOPY / LOOSE BODY / PAT ON ADMIT performed by Zuleika Darling MD at [facility]  11/2015    CARPAL TUNNEL RELEASE Right     CATARACT REMOVAL WITH IMPLANT Bilateral     COLONOSCOPY  12/15/05    CYSTOCELE REPAIR      HYSTERECTOMY      RECTOCELE REPAIR       Family History   Problem Relation Age of Onset    Cancer Mother         Liposarcoma    Heart Disease Father         Heart failure, Myelodysplasia    Cancer Brother         Kidney cancer    Diabetes type 2  Maternal Grandmother     Heart Disease Maternal Grandfather     Cancer Paternal Grandmother         Bone cancer    Diabetes Other         parkinson's disease     Social History     Socioeconomic History    Marital status:      Spouse name: None    Number of children: 2    Years of education: None    Highest education level: None   Occupational History    Occupation:    Tobacco Use    Smoking status: Former Smoker     Packs/day: 0.50     Years: 15.00     Pack years: 7.50     Types: Cigarettes     Quit date: 1972     Years since quittin.4    Smokeless tobacco: Never Used   Substance and Sexual Activity    Alcohol use: No    Drug use: No    Sexual activity: None   Other Topics Concern    None   Social History Narrative    Has one son and one daughter.  is a few years older and now has dementia and is on aricept. Social Determinants of Health     Financial Resource Strain: Low Risk     Difficulty of Paying Living Expenses: Not hard at all   Food Insecurity: No Food Insecurity    Worried About Running Out of Food in the Last Year: Never true    Noam of Food in the Last Year: Never true   Transportation Needs:     Lack of Transportation (Medical): Not on file    Lack of Transportation (Non-Medical):  Not on file   Physical Activity:     Days of Exercise per Week: Not on file    Minutes of Exercise per Session: Not on file   Stress:     Feeling of Stress : Not on file   Social Connections:     Frequency of Communication with Friends and Family: Not on file    Frequency of Social Gatherings with Friends and Family: Not on file    Attends [employer] Services: Not on file    Active Member of [address] [place] [address] or Organizations: Not on file    Attends Club or Organization Meetings: Not on file    Marital Status: Not on file   Intimate Partner Violence:     Fear of Current or Ex-Partner: Not on file    Emotionally Abused: Not on file    Physically Abused: Not on file    Sexually Abused: Not on file   Housing Stability:     Unable to Pay for Housing in the Last Year: Not on file    Number of [place] in the Last Year: Not on file    Unstable Housing in the Last Year: Not on file     Current Outpatient Medications on File Prior to Visit   Medication Sig Dispense Refill    levothyroxine (SYNTHROID) 75 MCG tablet TAKE 1 TABLET ON , TUESDAY, THURSDAY AND SATURDAY 52 tablet 3    levothyroxine (SYNTHROID) 50 MCG tablet TAKE 1 TABLET ON MONDAY, WEDNESDAY, AND FRIDAY ALTERNATING WITH THE 75 MCG 39 tablet 3    celecoxib (CELEBREX) 200 MG capsule TAKE 1 CAPSULE DAILY 90 capsule 3    busPIRone (BUSPAR) 5 MG tablet TAKE 1 TABLET TWICE A DAY AS NEEDED FOR ANXIETY 180 tablet 3    metoprolol succinate (TOPROL XL) 50 MG extended release tablet TAKE 1 TABLET TWICE A  tablet 2    Respiratory Therapy Supplies RISSA New CPAP mask and supplies 1 Device 0    pravastatin (PRAVACHOL) 40 MG tablet TAKE 1 TABLET NIGHTLY 90 tablet 3    Respiratory Therapy Supplies RISSA New CPAP nasal  mask and supplies 1 Device 0    CPAP Machine MISC by Does not apply route Auto CPAP 5-10 cm of H2O 1 each 0    calcium carbonate 600 MG TABS tablet Take 1 tablet by mouth daily      vitamin D (CHOLECALCIFEROL) 1000 UNIT TABS tablet Take 1,000 Units by mouth daily       No current facility-administered medications on file prior to visit. No Known Allergies    Review of Systems   Constitutional: Negative for activity change, appetite change, chills, diaphoresis, fatigue, fever and malaise/fatigue. HENT: Negative for congestion, ear pain, hearing loss and trouble swallowing. Eyes: Negative for pain and visual disturbance. Respiratory: Negative for cough, chest tightness and shortness of breath. Cardiovascular: Positive for palpitations. Negative for chest pain and leg swelling. Gastrointestinal: Negative for abdominal pain, constipation and diarrhea. Endocrine: Negative for polydipsia, polyphagia and polyuria. Genitourinary: Negative for difficulty urinating and dysuria. Musculoskeletal: Positive for arthralgias. Negative for back pain. Skin: Negative for color change and rash. Neurological: Negative for dizziness and light-headedness. Psychiatric/Behavioral: Positive for dysphoric mood. The patient is nervous/anxious.         Objective  Vitals:    02/15/22 1336 02/15/22 1343   BP: (!) 146/78 Dario Fuller Russell 146/78   Site: Left Upper Arm    Position: Sitting    Cuff Size: Medium Adult    Pulse: 76    Temp: 98.2 °F (36.8 °C)    TempSrc: Infrared    SpO2: 96%    Weight: 206 lb 6.4 oz (93.6 kg)    Height: 5' 2\" (1.575 m)      Physical Exam  Constitutional:       General: She is not in acute distress. Appearance: Normal appearance. She is obese. She is not ill-appearing, toxic-appearing or diaphoretic. HENT:      Head: Normocephalic and atraumatic. Right Ear: External ear normal.      Left Ear: External ear normal.      Nose: Nose normal. No congestion or rhinorrhea. Eyes:      Extraocular Movements: Extraocular movements intact. Conjunctiva/sclera: Conjunctivae normal.      Pupils: Pupils are equal, round, and reactive to light. Cardiovascular:      Rate and Rhythm: Normal rate and regular rhythm. Pulses: Normal pulses. Heart sounds: Normal heart sounds. No murmur heard. Pulmonary:      Effort: Pulmonary effort is normal. No respiratory distress. Breath sounds: Normal breath sounds. No stridor. No wheezing, rhonchi or rales. Chest:      Chest wall: No tenderness. Musculoskeletal:         General: Normal range of motion. Cervical back: Normal range of motion and neck supple. No tenderness. Right lower leg: No edema. Left lower leg: No edema. Lymphadenopathy:      Cervical: No cervical adenopathy. Skin:     General: Skin is warm. Capillary Refill: Capillary refill takes less than 2 seconds. Coloration: Skin is not jaundiced. Findings: No erythema or lesion. Neurological:      General: No focal deficit present. Mental Status: She is alert and oriented to person, place, and time. Mental status is at baseline. Cranial Nerves: No cranial nerve deficit. Coordination: Coordination normal.      Gait: Gait normal.   Psychiatric:         Mood and Affect: Mood normal.         Behavior: Behavior normal.         Thought Content:  Thought content normal.         Judgment: Judgment normal.         Assessment& Plan     Diagnosis Orders   1. Pulmonary hypertension ([facility]     2. Chronic obstructive pulmonary disease, unspecified COPD type ([facility]     3. Polycythemia vera ([facility]     4. Acquired hypothyroidism  TSH with Reflex   5. ELIZABETH (generalized anxiety disorder)     6. Lipid screening     7. Hypertension, unspecified type  Comprehensive Metabolic Panel   8. Mixed hyperlipidemia  Lipid Panel   9. Severe obesity (BMI 35.0-39. 9) with comorbidity ([facility]       Continue current regimen. Continue to follow with specialists. F/u in 4 months or sooner PRN. Side effects, adverse effects of the medication prescribed today, as well as treatment plan/ rationale and result expectations have been discussed with the patient who expresses understanding and desires to proceed. Close follow up to evaluate treatment results and for coordination of care. I have reviewed the patient's medical history in detail and updated the computerized patient record. As always, patient is advised that if symptoms worsen in any way they will proceed to the nearest emergency room. Orders Placed This Encounter   Procedures    Comprehensive Metabolic Panel     Standing Status:   Future     Standing Expiration Date:   2/15/2023    Lipid Panel     Standing Status:   Future     Standing Expiration Date:   2/15/2023     Order Specific Question:   Is Patient Fasting?/# of Hours     Answer:   8    TSH with Reflex     Standing Status:   Future     Standing Expiration Date:   2/15/2023       No orders of the defined types were placed in this encounter. Medications Discontinued During This Encounter   Medication Reason    MAGNESIUM PO LIST CLEANUP       Return in about 4 months (around 6/15/2022) for htn, anxiety.     Ruth Arzate, LYNDA - CNP

## 2022-03-18 DIAGNOSIS — E03.9 ACQUIRED HYPOTHYROIDISM: ICD-10-CM

## 2022-03-18 DIAGNOSIS — E78.2 MIXED HYPERLIPIDEMIA: ICD-10-CM

## 2022-03-18 DIAGNOSIS — I10 HYPERTENSION, UNSPECIFIED TYPE: ICD-10-CM

## 2022-03-18 LAB
ALBUMIN SERPL-MCNC: 4 G/DL (ref 3.5–4.6)
ALP BLD-CCNC: 79 U/L (ref 40–130)
ALT SERPL-CCNC: 10 U/L (ref 0–33)
ANION GAP SERPL CALCULATED.3IONS-SCNC: 11 MEQ/L (ref 9–15)
AST SERPL-CCNC: 17 U/L (ref 0–35)
BILIRUB SERPL-MCNC: 0.7 MG/DL (ref 0.2–0.7)
BUN BLDV-MCNC: 19 MG/DL (ref 8–23)
CALCIUM SERPL-MCNC: 8.9 MG/DL (ref 8.5–9.9)
CHLORIDE BLD-SCNC: 103 MEQ/L (ref 95–107)
CHOLESTEROL, TOTAL: 168 MG/DL (ref 0–199)
CO2: 23 MEQ/L (ref 20–31)
CREAT SERPL-MCNC: 0.88 MG/DL (ref 0.5–0.9)
GFR AFRICAN AMERICAN: >60
GFR NON-AFRICAN AMERICAN: >60
GLOBULIN: 3.1 G/DL (ref 2.3–3.5)
GLUCOSE BLD-MCNC: 93 MG/DL (ref 70–99)
HDLC SERPL-MCNC: 53 MG/DL (ref 40–59)
LDL CHOLESTEROL CALCULATED: 89 MG/DL (ref 0–129)
POTASSIUM SERPL-SCNC: 4.8 MEQ/L (ref 3.4–4.9)
SODIUM BLD-SCNC: 137 MEQ/L (ref 135–144)
TOTAL PROTEIN: 7.1 G/DL (ref 6.3–8)
TRIGL SERPL-MCNC: 132 MG/DL (ref 0–150)
TSH REFLEX: 2.2 UIU/ML (ref 0.44–3.86)

## 2022-03-24 DIAGNOSIS — I10 ESSENTIAL HYPERTENSION: ICD-10-CM

## 2022-03-24 DIAGNOSIS — E78.2 MIXED HYPERLIPIDEMIA: ICD-10-CM

## 2022-03-24 RX ORDER — METOPROLOL SUCCINATE 50 MG/1
TABLET, EXTENDED RELEASE ORAL
Qty: 180 TABLET | Refills: 3 | Status: SHIPPED | OUTPATIENT
Start: 2022-03-24

## 2022-03-24 RX ORDER — PRAVASTATIN SODIUM 40 MG
TABLET ORAL
Qty: 90 TABLET | Refills: 0 | Status: SHIPPED | OUTPATIENT
Start: 2022-03-24 | End: 2022-06-14

## 2022-03-24 NOTE — TELEPHONE ENCOUNTER
Please approve or deny this refill request. The order is pended. Thank you.     LOV 9/14/2021    Next Visit Date:  Future Appointments   Date Time Provider Elzbieta Rivas   4/13/2022  3:30 PM David Quevedo MD [facility]   5/17/2022 10:15 AM [facility] AT [place]

## 2022-03-24 NOTE — TELEPHONE ENCOUNTER
Requesting medication refill.  Please approve or deny this request.    Rx requested:  Requested Prescriptions     Pending Prescriptions Disp Refills    pravastatin (PRAVACHOL) 40 MG tablet [Pharmacy Med Name: PRAVASTATIN TABS 40MG] 90 tablet 3     Sig: TAKE 1 TABLET NIGHTLY       Last Office Visit:   2/15/2022    Next Visit Date:  Future Appointments   Date Time Provider Elzbieta Rivas   4/13/2022  3:30 PM Lotus Lane MD [address] [place] [address]   5/17/2022 10:15 AM [facility] [place]

## 2022-04-11 DIAGNOSIS — M47.812 OSTEOARTHRITIS OF CERVICAL SPINE, UNSPECIFIED SPINAL OSTEOARTHRITIS COMPLICATION STATUS: Primary | ICD-10-CM

## 2022-04-13 ENCOUNTER — OFFICE VISIT (OUTPATIENT)
Dept: CARDIOLOGY CLINIC | Age: 82
End: 2022-04-13
Payer: MEDICARE

## 2022-04-13 VITALS
HEIGHT: 62 IN | RESPIRATION RATE: 18 BRPM | HEART RATE: 65 BPM | OXYGEN SATURATION: 96 % | WEIGHT: 200 LBS | SYSTOLIC BLOOD PRESSURE: 126 MMHG | BODY MASS INDEX: 36.8 KG/M2 | DIASTOLIC BLOOD PRESSURE: 68 MMHG

## 2022-04-13 DIAGNOSIS — I10 ESSENTIAL HYPERTENSION: Primary | ICD-10-CM

## 2022-04-13 DIAGNOSIS — R00.2 PALPITATION: ICD-10-CM

## 2022-04-13 DIAGNOSIS — E78.2 MIXED HYPERLIPIDEMIA: ICD-10-CM

## 2022-04-13 PROCEDURE — 99214 OFFICE O/P EST MOD 30 MIN: CPT | Performed by: INTERNAL MEDICINE

## 2022-04-13 ASSESSMENT — ENCOUNTER SYMPTOMS
GASTROINTESTINAL NEGATIVE: 1
BLOOD IN STOOL: 0
CHEST TIGHTNESS: 0
NAUSEA: 0
STRIDOR: 0
COUGH: 0
WHEEZING: 0
EYES NEGATIVE: 1

## 2022-04-13 NOTE — PROGRESS NOTES
Subsequent Progress Note  Patient: Trevor Garcia  YOB: 1940  MRN: [id number]    Chief Complaint: alejandre  htn palp polycythemia LAKSHMI  Chief Complaint   Patient presents with    6 Month Follow-Up    Hypertension    Palpitations     has PVC's she can feel on occasion       CV Data:  10/2017 SPECT negative  10/2017 ECHO 60%  4/21 Echo EF 55       Subjective/HPI: no cp +d no falls no bleed eats well. occ palp     9/27/2019: had 2U Phlebotomy 2 mo ago. This made her feel better. No cp no sob no falls no bleed. 3/6/2020 still getting Phlebotomy  No cp no sob no falls no bleed. 9/11/2020 no cp no sob no falls no bleed. Uses CPAP.     3/11/21 no cp no sob no falls no bleed sleeps welll w CPAP. 9/14/21 recently under stress due to 's demntia. At that time had frequwnt palps but resolved. 4/13/22 occ alejandre occ palps no cp no falls no bleed.      EKG: SR 71    Past Medical History:   Diagnosis Date    Bladder prolapse, female, acquired     Chest pain 10/14/2017    Degenerative arthritis of cervical spine 6/29/2012    Diverticulosis     ALEJANDRE (dyspnea on exertion) 10/14/2017    ELIZABETH (generalized anxiety disorder)     Generalized osteoarthritis of multiple sites     knees and neck    Heart palpitations 11/13/2017    History of bone density study 09/2014    Hyperlipidemia 6/29/2012    Hypertension 6/29/2012    Hypothyroidism 6/29/2012    Internal hemorrhoid     Osteopenia 09/2014    Seborrheic dermatitis of scalp        Past Surgical History:   Procedure Laterality Date    ARTHROSCOPY / ARTHROTOMY KNEE Right 10/14/2016    RIGHT KNEE ARTHROSCOPY / LOOSE BODY / PAT ON ADMIT performed by Sg Rehman MD at [address]  11/2015    CARPAL TUNNEL RELEASE Right     CATARACT REMOVAL WITH IMPLANT Bilateral     COLONOSCOPY  12/15/05    CYSTOCELE REPAIR      HYSTERECTOMY      RECTOCELE REPAIR         Family History   Problem Relation Age of Onset    Cancer Mother Intimate Partner Violence:     Fear of Current or Ex-Partner: Not on file    Emotionally Abused: Not on file    Physically Abused: Not on file    Sexually Abused: Not on file   Housing Stability:     Unable to Pay for Housing in the Last Year: Not on file    Number of Places Lived in the Last Year: Not on file    Unstable Housing in the Last Year: Not on file       No Known Allergies    Current Outpatient Medications   Medication Sig Dispense Refill    Handicap Placard MISC by Does not apply route Expires 5 years from date 1 each 0    Handicap Placard MISC by Does not apply route Exp 5 years 1 each 0    metoprolol succinate (TOPROL XL) 50 MG extended release tablet TAKE 1 TABLET TWICE A  tablet 3    pravastatin (PRAVACHOL) 40 MG tablet TAKE 1 TABLET NIGHTLY 90 tablet 0    levothyroxine (SYNTHROID) 75 MCG tablet TAKE 1 TABLET ON SUNDAY, TUESDAY, THURSDAY AND SATURDAY 52 tablet 3    levothyroxine (SYNTHROID) 50 MCG tablet TAKE 1 TABLET ON MONDAY, WEDNESDAY, AND FRIDAY ALTERNATING WITH THE 75 MCG 39 tablet 3    celecoxib (CELEBREX) 200 MG capsule TAKE 1 CAPSULE DAILY 90 capsule 3    busPIRone (BUSPAR) 5 MG tablet TAKE 1 TABLET TWICE A DAY AS NEEDED FOR ANXIETY 180 tablet 3    Respiratory Therapy Supplies RISSA New CPAP mask and supplies 1 Device 0    Respiratory Therapy Supplies RISSA New CPAP nasal  mask and supplies 1 Device 0    CPAP Machine MISC by Does not apply route Auto CPAP 5-10 cm of H2O 1 each 0    calcium carbonate 600 MG TABS tablet Take 1 tablet by mouth daily      vitamin D (CHOLECALCIFEROL) 1000 UNIT TABS tablet Take 1,000 Units by mouth daily       No current facility-administered medications for this visit. Review of Systems:   Review of Systems   Constitutional: Negative. Negative for diaphoresis and fatigue. HENT: Negative. Eyes: Negative. Respiratory: Negative for cough, chest tightness, wheezing and stridor.     Cardiovascular: Negative for chest pain and leg swelling. Gastrointestinal: Negative. Negative for blood in stool and nausea. Genitourinary: Negative. Musculoskeletal: Negative. Skin: Negative. Neurological: Negative. Negative for dizziness, syncope, weakness and light-headedness. Hematological: Negative. Psychiatric/Behavioral: Negative. Physical Examination:    /68 (Site: Right Upper Arm, Position: Sitting, Cuff Size: Medium Adult)   Pulse 65   Resp 18   Ht 5' 2\" (1.575 m)   Wt 200 lb (90.7 kg)   LMP  (LMP Unknown)   SpO2 96%   BMI 36.58 kg/m²    Physical Exam   Constitutional: She appears healthy. No distress. HENT:   Normal cephalic and Atraumatic   Eyes: Pupils are equal, round, and reactive to light. Neck: Thyroid normal. No JVD present. No neck adenopathy. No thyromegaly present. Cardiovascular: Normal rate, regular rhythm, normal heart sounds, intact distal pulses and normal pulses. Pulmonary/Chest: Effort normal and breath sounds normal. She has no wheezes. She has no rales. She exhibits no tenderness. Abdominal: Soft. Bowel sounds are normal. There is no abdominal tenderness. Musculoskeletal:         General: No tenderness or edema. Normal range of motion. Cervical back: Normal range of motion and neck supple. Neurological: She is alert and oriented to person, place, and time. Skin: Skin is warm. No cyanosis. Nails show no clubbing.        LABS:  CBC:   Lab Results   Component Value Date    WBC 4.8 05/10/2019    RBC 5.43 05/10/2019    HGB 17.0 05/10/2019    HCT 50.1 05/10/2019    MCV 92.3 05/10/2019    MCH 31.4 05/10/2019    MCHC 34.0 05/10/2019    RDW 13.7 05/10/2019     05/10/2019     Lipids:  Lab Results   Component Value Date    CHOL 168 03/18/2022    CHOL 161 03/02/2021    CHOL 187 01/30/2020     Lab Results   Component Value Date    TRIG 132 03/18/2022    TRIG 143 03/02/2021    TRIG 223 (H) 01/30/2020     Lab Results   Component Value Date    HDL 53 03/18/2022    HDL 52 03/02/2021    HDL 50 01/30/2020     Lab Results   Component Value Date    LDLCALC 89 03/18/2022    LDLCALC 80 03/02/2021    LDLCALC 92 01/30/2020     No results found for: LABVLDL, VLDL  Lab Results   Component Value Date    CHOLHDLRATIO 2.9 03/14/2013    CHOLHDLRATIO 2.8 10/01/2012    CHOLHDLRATIO 4.2 07/02/2012     CMP:    Lab Results   Component Value Date     03/18/2022    K 4.8 03/18/2022     03/18/2022    CO2 23 03/18/2022    BUN 19 03/18/2022    CREATININE 0.88 03/18/2022    GFRAA >60.0 03/18/2022    LABGLOM >60.0 03/18/2022    GLUCOSE 93 03/18/2022    PROT 7.1 03/18/2022    LABALBU 4.0 03/18/2022    CALCIUM 8.9 03/18/2022    BILITOT 0.7 03/18/2022    ALKPHOS 79 03/18/2022    AST 17 03/18/2022    ALT 10 03/18/2022     BMP:    Lab Results   Component Value Date     03/18/2022    K 4.8 03/18/2022     03/18/2022    CO2 23 03/18/2022    BUN 19 03/18/2022    LABALBU 4.0 03/18/2022    CREATININE 0.88 03/18/2022    CALCIUM 8.9 03/18/2022    GFRAA >60.0 03/18/2022    LABGLOM >60.0 03/18/2022    GLUCOSE 93 03/18/2022     Magnesium:    Lab Results   Component Value Date    MG 2.0 10/14/2017     TSH:  Lab Results   Component Value Date    TSH 2.770 09/25/2018       Patient Active Problem List   Diagnosis    Hypertension    Hyperlipidemia    Hypothyroidism    Degenerative arthritis of cervical spine    Osteoarthritis of knees, bilateral    Seborrheic dermatitis of scalp    Generalized osteoarthritis of multiple sites    ELIZABETH (generalized anxiety disorder)    Elevated hemoglobin (HCC)    Heart palpitations    Mixed hyperlipidemia    ALEJANDRE (dyspnea on exertion)    Sleep apnea    Obesity (BMI 30-39. 9)    Polycythemia    Morbidly obese (HCC)    Chronic obstructive pulmonary disease, unspecified COPD type ([facility]    Pulmonary hypertension ([facility]       There are no discontinued medications.     Modified Medications    No medications on file       No orders of the defined types were placed in this encounter. Assessment/Plan:    1. Essential hypertension   stable - continue meds. Low salt diet    2. Heart palpitations   stable - occasion. Much improved. 3. Mixed hyperlipidemia   statin - continue meds. Low fat diet. 4. ALEJANDRE (dyspnea on exertion)  Stable. 5.  Polycythemia -  see Hematology - PRN Phlebotomy     6. LAKSHMI- CPAP     7. TR - stable    8. Pals- Continue bid Toprol. Counseling:  Heart Healthy Lifestyle, Low Salt Diet, Take Precautions to Prevent Falls and Walk Daily    Return in about 6 months (around 10/13/2022).       Electronically signed by Felix Sol MD on 4/13/2022 at 4:02 PM

## 2022-05-17 ENCOUNTER — OFFICE VISIT (OUTPATIENT)
Dept: FAMILY MEDICINE CLINIC | Age: 82
End: 2022-05-17
Payer: MEDICARE

## 2022-05-17 VITALS
DIASTOLIC BLOOD PRESSURE: 76 MMHG | HEART RATE: 69 BPM | WEIGHT: 202 LBS | HEIGHT: 62 IN | SYSTOLIC BLOOD PRESSURE: 124 MMHG | TEMPERATURE: 98.3 F | OXYGEN SATURATION: 98 % | BODY MASS INDEX: 37.17 KG/M2

## 2022-05-17 DIAGNOSIS — F41.1 GAD (GENERALIZED ANXIETY DISORDER): ICD-10-CM

## 2022-05-17 DIAGNOSIS — Z78.0 POST-MENOPAUSAL: ICD-10-CM

## 2022-05-17 DIAGNOSIS — Z00.00 MEDICARE ANNUAL WELLNESS VISIT, SUBSEQUENT: Primary | ICD-10-CM

## 2022-05-17 DIAGNOSIS — D45 POLYCYTHEMIA VERA (HCC): ICD-10-CM

## 2022-05-17 PROCEDURE — G0439 PPPS, SUBSEQ VISIT: HCPCS | Performed by: NURSE PRACTITIONER

## 2022-05-17 RX ORDER — BUSPIRONE HYDROCHLORIDE 5 MG/1
TABLET ORAL
Qty: 180 TABLET | Refills: 3 | Status: SHIPPED | OUTPATIENT
Start: 2022-05-17

## 2022-05-17 ASSESSMENT — PATIENT HEALTH QUESTIONNAIRE - PHQ9
SUM OF ALL RESPONSES TO PHQ QUESTIONS 1-9: 0
SUM OF ALL RESPONSES TO PHQ QUESTIONS 1-9: 0
2. FEELING DOWN, DEPRESSED OR HOPELESS: 0
SUM OF ALL RESPONSES TO PHQ QUESTIONS 1-9: 0
SUM OF ALL RESPONSES TO PHQ QUESTIONS 1-9: 0
1. LITTLE INTEREST OR PLEASURE IN DOING THINGS: 0
SUM OF ALL RESPONSES TO PHQ9 QUESTIONS 1 & 2: 0

## 2022-05-17 ASSESSMENT — LIFESTYLE VARIABLES: HOW OFTEN DO YOU HAVE A DRINK CONTAINING ALCOHOL: NEVER

## 2022-05-17 NOTE — PATIENT INSTRUCTIONS
Personalized Preventive Plan for Sheridan Palumbo - 5/17/2022  Medicare offers a range of preventive health benefits. Some of the tests and screenings are paid in full while other may be subject to a deductible, co-insurance, and/or copay. Some of these benefits include a comprehensive review of your medical history including lifestyle, illnesses that may run in your family, and various assessments and screenings as appropriate. After reviewing your medical record and screening and assessments performed today your provider may have ordered immunizations, labs, imaging, and/or referrals for you. A list of these orders (if applicable) as well as your Preventive Care list are included within your After Visit Summary for your review. Other Preventive Recommendations:    · A preventive eye exam performed by an eye specialist is recommended every 1-2 years to screen for glaucoma; cataracts, macular degeneration, and other eye disorders. · A preventive dental visit is recommended every 6 months. · Try to get at least 150 minutes of exercise per week or 10,000 steps per day on a pedometer . · Order or download the FREE \"Exercise & Physical Activity: Your Everyday Guide\" from [employer]. Call 2-523.694.6440 or search [employer] online. · You need 3099-6939 mg of calcium and 9091-3365 IU of vitamin D per day. It is possible to meet your calcium requirement with diet alone, but a vitamin D supplement is usually necessary to meet this goal.  · When exposed to the sun, use a sunscreen that protects against both UVA and UVB radiation with an SPF of 30 or greater. Reapply every 2 to 3 hours or after sweating, drying off with a towel, or swimming. · Always wear a seat belt when traveling in a car. Always wear a helmet when riding a bicycle or motorcycle.

## 2022-05-17 NOTE — PROGRESS NOTES
Medicare Annual Wellness Visit    Bello Langford is here for Medicare AWV    Assessment & Plan   Medicare annual wellness visit, subsequent  ELIZABETH (generalized anxiety disorder)  The following orders have not been finalized:  -     busPIRone (BUSPAR) 5 MG tablet      Recommendations for Preventive Services Due: see orders and patient instructions/AVS.  Recommended screening schedule for the next 5-10 years is provided to the patient in written form: see Patient Instructions/AVS.     Return for Medicare Annual Wellness Visit in 1 year. Subjective   The following acute and/or chronic problems were also addressed today:   Diagnosis Orders   1. Medicare annual wellness visit, subsequent     2. ELIZABETH (generalized anxiety disorder)  busPIRone (BUSPAR) 5 MG tablet   3. Post-menopausal  DEXA BONE DENSITY AXIAL SKELETON   4. Polycythemia vera ([address]       Continue buspar. Continue to follow with hematology. Patient's complete Health Risk Assessment and screening values have been reviewed and are found in Flowsheets. The following problems were reviewed today and where indicated follow up appointments were made and/or referrals ordered.     Positive Risk Factor Screenings with Interventions:    Fall Risk:  Do you feel unsteady or are you worried about falling? : (!) yes  2 or more falls in past year?: no  Fall with injury in past year?: no     Fall Risk Interventions:    · Home safety tips provided  · Patient declines any further evaluation/treatment for this issue            General Health and ACP:  General  In general, how would you say your health is?: Good  In the past 7 days, have you experienced any of the following: New or Increased Pain, New or Increased Fatigue, Loneliness, Social Isolation, Stress or Anger?: No  Do you get the social and emotional support that you need?: Yes  Do you have a Living Will?: Yes    Advance Directives     Power of  Living Will ACP-Advance Directive ACP-Power of [employer] Not on File Not on File Not on File Not on File      General Health Risk Interventions:  · no issues    Health Habits/Nutrition:     Physical Activity: Unknown    Days of Exercise per Week: 0 days    Minutes of Exercise per Session: Not on file     Have you lost any weight without trying in the past 3 months?: No  Body mass index: (!) 36.94  Have you seen the dentist within the past year?: (!) No    Health Habits/Nutrition Interventions:  · Dental exam overdue:  patient encouraged to make appointment with his/her dentist    Hearing/Vision:  Do you or your family notice any trouble with your hearing that hasn't been managed with hearing aids?: No  Do you have difficulty driving, watching TV, or doing any of your daily activities because of your eyesight?: No  Have you had an eye exam within the past year?: (!) No  No exam data present    Hearing/Vision Interventions:  · Vision concerns:  patient declines any further evaluation/treatment for this issue     ADLs:  In the past 7 days, did you need help from others to perform any of the following everyday activities: Eating, dressing, grooming, bathing, toileting, or walking/balance?: (!) Yes (uses a cane)  In the past 7 days, did you need help from others to take care of any of the following: Laundry, housekeeping, banking/finances, shopping, telephone use, food preparation, transportation, or taking medications?: (!) Yes  Select all that apply: (!) Housekeeping    ADL Interventions:  · Patient declines any further evaluation/treatment for this issue  · gets assistance from family          Objective   Vitals:    05/17/22 1011   BP: 124/76   Site: Left Upper Arm   Position: Sitting   Cuff Size: Medium Adult   Pulse: 69   Temp: 98.3 °F (36.8 °C)   TempSrc: Infrared   SpO2: 98%   Weight: 202 lb (91.6 kg)   Height: 5' 2\" (1.575 m)      Body mass index is 36.95 kg/m².         General Appearance: alert and oriented to person, place and time, well developed and well- nourished, in no acute distress  Skin: warm and dry, no rash or erythema  Head: normocephalic and atraumatic  Eyes: pupils equal, round, and reactive to light, extraocular eye movements intact, conjunctivae normal  Neck: supple and non-tender without mass, no thyromegaly or thyroid nodules, no cervical lymphadenopathy  Pulmonary/Chest: clear to auscultation bilaterally- no wheezes, rales or rhonchi, normal air movement, no respiratory distress  Cardiovascular: normal rate, regular rhythm, normal S1 and S2, no murmurs, rubs, clicks, or gallops, distal pulses intact, no carotid bruits  Extremities: no cyanosis, clubbing or edema  Musculoskeletal: normal range of motion, no joint swelling, deformity or tenderness  Neurologic: reflexes normal and symmetric, no cranial nerve deficit, gait, coordination and speech normal       No Known Allergies  Prior to Visit Medications    Medication Sig Taking?  Authorizing Provider   Handicap Placard MISC by Does not apply route Expires 5 years from date Yes MD Melissa Phillips MISC by Does not apply route Exp 5 years Yes Nicki Carey MD   metoprolol succinate (TOPROL XL) 50 MG extended release tablet TAKE 1 TABLET TWICE A DAY Yes Danielle Martinez MD   pravastatin (PRAVACHOL) 40 MG tablet TAKE 1 TABLET NIGHTLY Yes LYNDA Pinzon CNP   levothyroxine (SYNTHROID) 75 MCG tablet TAKE 1 TABLET ON SUNDAY, TUESDAY, THURSDAY AND SATURDAY Yes LYNDA Pinzon CNP   levothyroxine (SYNTHROID) 50 MCG tablet TAKE 1 TABLET ON MONDAY, WEDNESDAY, AND FRIDAY ALTERNATING WITH THE 76 MCG Yes LYNDA Pinzon CNP   celecoxib (CELEBREX) 200 MG capsule TAKE 1 CAPSULE DAILY Yes Josué Corado DO   busPIRone (BUSPAR) 5 MG tablet TAKE 1 TABLET TWICE A DAY AS NEEDED FOR ANXIETY Yes LYNDA Pinzon CNP   Respiratory Therapy Supplies RISSA New CPAP mask and supplies Yes Milton Mena MD   Respiratory Therapy Supplies RISSA New CPAP nasal  mask and supplies Yes Dev Muhammad MD   CPAP Machine MISC by Does not apply route Auto CPAP 5-10 cm of H2O Yes Dev Muhammad MD   calcium carbonate 600 MG TABS tablet Take 1 tablet by mouth daily Yes Historical Provider, MD   vitamin D (CHOLECALCIFEROL) 1000 UNIT TABS tablet Take 1,000 Units by mouth daily Yes Historical Provider, MD       CareTeam (Including outside providers/suppliers regularly involved in providing care):   Patient Care Team:  LYNDA Dalton CNP as PCP - General (Family Medicine)  LYNDA Dalton CNP as PCP [place] [facility]an[place]  Finn Casas MD as Cardiologist (Cardiology)     Reviewed and updated this visit:  Tobacco  Allergies  Meds  Med Hx  Surg Hx  Soc Hx  Fam Hx

## 2022-06-02 ENCOUNTER — HOSPITAL ENCOUNTER (OUTPATIENT)
Dept: WOMENS IMAGING | Age: 82
Discharge: HOME | End: 2022-06-04
Payer: MEDICARE

## 2022-06-02 DIAGNOSIS — Z78.0 POST-MENOPAUSAL: ICD-10-CM

## 2022-06-02 PROCEDURE — 77080 DXA BONE DENSITY AXIAL: CPT

## 2022-06-12 DIAGNOSIS — E78.2 MIXED HYPERLIPIDEMIA: ICD-10-CM

## 2022-06-12 NOTE — TELEPHONE ENCOUNTER
Provider Department Appt Notes   10/5/2022 Lizette Lancaster MD [facility] Cardiology 6 MONTHS   11/17/2022 LYNDA Arellano - [facility] Return in 6 months (on 11/17/2022)  htn, anxiety.        Past Visits    Date Provider Specialty Visit Type Primary Dx   05/17/2022 LYNDA Arellano - CNP Family Medicine Office Visit Medicare annual wellness visit, subsequent   04/13/2022 Lizette Lancaster MD Cardiology Office Visit Essential hypertension   02/15/2022 LYNDA Arellano - CNP Family Medicine Office Visit Pulmonary [facility])   10/22/2021  Family Medicine Nurse Only Flu vaccine need   09/14/2021 Lizette Lancaster MD Cardiology Office Visit Essential

## 2022-06-14 RX ORDER — PRAVASTATIN SODIUM 40 MG
TABLET ORAL
Qty: 90 TABLET | Refills: 3 | Status: SHIPPED | OUTPATIENT
Start: 2022-06-14

## 2022-07-20 NOTE — PROGRESS NOTES
since quittin.7    Smokeless tobacco: Never Used   Substance and Sexual Activity    Alcohol use: No    Drug use: No    Sexual activity: None   Lifestyle    Physical activity:     Days per week: None     Minutes per session: None    Stress: None   Relationships    Social connections:     Talks on phone: None     Gets together: None     Attends [employer] service: None     Active member of club or organization: None     Attends meetings of clubs or organizations: None     Relationship status: None    Intimate partner violence:     Fear of current or ex partner: None     Emotionally abused: None     Physically abused: None     Forced sexual activity: None   Other Topics Concern    None   Social History Narrative    Has one son and one daughter.  is a few years older and now has dementia and is on aricept. No Known Allergies    Current Outpatient Medications   Medication Sig Dispense Refill    metoprolol succinate (TOPROL XL) 50 MG extended release tablet TAKE 1 TABLET BY MOUTH TWO  TIMES DAILY 60 tablet 0    pravastatin (PRAVACHOL) 40 MG tablet Take 1 tablet by mouth nightly 90 tablet 3    levothyroxine (SYNTHROID) 75 MCG tablet TAKE 1 TABLET BY MOUTH ON  , TUESDAY, THURSDAY  AND SATURDAY 52 tablet 3    levothyroxine (SYNTHROID) 50 MCG tablet TAKE 1 TABLET BY MOUTH ON  MONDAY, WEDNESDAY AND  FRIDAY ALTERNATING WITH THE 75MCG 39 tablet 3    celecoxib (CELEBREX) 200 MG capsule TAKE 1 CAPSULE BY MOUTH  DAILY 90 capsule 3    calcium carbonate 600 MG TABS tablet Take 1 tablet by mouth daily      vitamin D (CHOLECALCIFEROL) 1000 UNIT TABS tablet Take 1,000 Units by mouth daily      busPIRone (BUSPAR) 5 MG tablet Take 1 tablet by mouth 2 times daily as needed (anxiety) 60 tablet 0     No current facility-administered medications for this visit. Review of Systems:   Review of Systems   Constitutional: Negative. Negative for diaphoresis and fatigue. HENT: Negative.     Eyes: Negative. Respiratory: Positive for shortness of breath. Negative for cough, chest tightness, wheezing and stridor. Cardiovascular: Positive for palpitations. Negative for chest pain and leg swelling. Gastrointestinal: Negative. Negative for blood in stool and nausea. Genitourinary: Negative. Musculoskeletal: Negative. Skin: Negative. Neurological: Negative. Negative for dizziness, syncope, weakness and light-headedness. Hematological: Negative. Psychiatric/Behavioral: Negative. Physical Examination:    /76 (Site: Left Upper Arm, Position: Sitting, Cuff Size: Large Adult)   Pulse 82   Resp 16   Ht 5' 2\" (1.575 m)   Wt 202 lb (91.6 kg)   SpO2 97%   BMI 36.95 kg/m²    Physical Exam   Constitutional: She appears healthy. No distress. HENT:   Normal cephalic and Atraumatic   Eyes: Pupils are equal, round, and reactive to light. Neck: Normal range of motion and thyroid normal. Neck supple. No JVD present. No neck adenopathy. No thyromegaly present. Cardiovascular: Normal rate, regular rhythm, normal heart sounds, intact distal pulses and normal pulses. Pulmonary/Chest: Effort normal and breath sounds normal. She has no wheezes. She has no rales. She exhibits no tenderness. Abdominal: Soft. Bowel sounds are normal. There is no tenderness. Musculoskeletal: Normal range of motion. She exhibits no edema or tenderness. Neurological: She is alert and oriented to person, place, and time. Skin: Skin is warm. No cyanosis. Nails show no clubbing.        LABS:  CBC:   Lab Results   Component Value Date    WBC 4.8 05/10/2019    RBC 5.43 05/10/2019    HGB 17.0 05/10/2019    HCT 50.1 05/10/2019    MCV 92.3 05/10/2019    MCH 31.4 05/10/2019    MCHC 34.0 05/10/2019    RDW 13.7 05/10/2019     05/10/2019     Lipids:  Lab Results   Component Value Date    CHOL 188 09/25/2018    CHOL 171 10/14/2017    CHOL 204 (H) 02/24/2017     Lab Results   Component Value Date    TRIG 178 09/25/2018    TRIG 162 10/14/2017    TRIG 185 02/24/2017     Lab Results   Component Value Date    HDL 51 09/25/2018    HDL 50 10/14/2017    HDL 57 02/24/2017     Lab Results   Component Value Date    LDLCALC 101 09/25/2018    LDLCALC 89 10/14/2017    LDLCALC 110 02/24/2017     No results found for: LABVLDL, VLDL  Lab Results   Component Value Date    CHOLHDLRATIO 2.9 03/14/2013    CHOLHDLRATIO 2.8 10/01/2012    CHOLHDLRATIO 4.2 07/02/2012     CMP:    Lab Results   Component Value Date     05/10/2019    K 4.4 05/10/2019     05/10/2019    CO2 26 05/10/2019    BUN 16 05/10/2019    CREATININE 0.67 05/10/2019    GFRAA >60.0 05/10/2019    LABGLOM >60.0 05/10/2019    GLUCOSE 61 05/10/2019    PROT 7.3 05/10/2019    LABALBU 4.4 05/10/2019    CALCIUM 9.4 05/10/2019    BILITOT 0.8 05/10/2019    ALKPHOS 76 05/10/2019    AST 19 05/10/2019    ALT 13 05/10/2019     BMP:    Lab Results   Component Value Date     05/10/2019    K 4.4 05/10/2019     05/10/2019    CO2 26 05/10/2019    BUN 16 05/10/2019    LABALBU 4.4 05/10/2019    CREATININE 0.67 05/10/2019    CALCIUM 9.4 05/10/2019    GFRAA >60.0 05/10/2019    LABGLOM >60.0 05/10/2019    GLUCOSE 61 05/10/2019     Magnesium:    Lab Results   Component Value Date    MG 2.0 10/14/2017     TSH:  Lab Results   Component Value Date    TSH 2.770 09/25/2018       Patient Active Problem List   Diagnosis    Hypertension    Hyperlipidemia    Hypothyroidism    Degenerative arthritis of cervical spine    Osteoarthritis of knees, bilateral    Seborrheic dermatitis of scalp    Generalized osteoarthritis of multiple sites    ELIZABETH (generalized anxiety disorder)    Elevated hemoglobin (HCC)    Heart palpitations    Mixed hyperlipidemia    ALEJANDRE (dyspnea on exertion)       There are no discontinued medications. Modified Medications    No medications on file       No orders of the defined types were placed in this encounter. Assessment/Plan:    1.  Essential hypertension   stable   - metoprolol succinate (TOPROL XL) 50 MG extended release tablet; TAKE 1 TABLET BY MOUTH TWO  TIMES DAILY  Dispense: 180 tablet; Refill: 0    2. Heart palpitations   stable - occ palp     3. Mixed hyperlipidemia   statin     4. ALEJANDRE (dyspnea on exertion)  Stable. 5.  Leukocytosis- chronic- see Hematology        Counseling:  Heart Healthy Lifestyle, Low Salt Diet, Take Precautions to Prevent Falls and Walk Daily    Return in about 4 months (around 9/20/2019) for Cardiovascular care. .      Electronically signed by Dayanara Orozco MD on 5/20/2019 at 1:37 PM SSKI Counseling:  I discussed with the patient the risks of SSKI including but not limited to thyroid abnormalities, metallic taste, GI upset, fever, headache, acne, arthralgias, paraesthesias, lymphadenopathy, easy bleeding, arrhythmias, and allergic reaction.

## 2022-07-29 ENCOUNTER — OFFICE VISIT (OUTPATIENT)
Dept: FAMILY MEDICINE CLINIC | Age: 82
End: 2022-07-29
Payer: MEDICARE

## 2022-07-29 VITALS
SYSTOLIC BLOOD PRESSURE: 118 MMHG | DIASTOLIC BLOOD PRESSURE: 70 MMHG | OXYGEN SATURATION: 98 % | WEIGHT: 202 LBS | HEIGHT: 62 IN | TEMPERATURE: 98.6 F | BODY MASS INDEX: 37.17 KG/M2 | HEART RATE: 60 BPM

## 2022-07-29 DIAGNOSIS — N30.00 ACUTE CYSTITIS WITHOUT HEMATURIA: ICD-10-CM

## 2022-07-29 DIAGNOSIS — N30.00 ACUTE CYSTITIS WITHOUT HEMATURIA: Primary | ICD-10-CM

## 2022-07-29 LAB
BILIRUBIN, POC: ABNORMAL
BLOOD URINE, POC: ABNORMAL
CLARITY, POC: CLEAR
COLOR, POC: YELLOW
GLUCOSE URINE, POC: ABNORMAL
KETONES, POC: ABNORMAL
LEUKOCYTE EST, POC: ABNORMAL
NITRITE, POC: ABNORMAL
PH, POC: 6
PROTEIN, POC: ABNORMAL
SPECIFIC GRAVITY, POC: 1.01
UROBILINOGEN, POC: ABNORMAL

## 2022-07-29 PROCEDURE — 99213 OFFICE O/P EST LOW 20 MIN: CPT | Performed by: NURSE PRACTITIONER

## 2022-07-29 PROCEDURE — 81003 URINALYSIS AUTO W/O SCOPE: CPT | Performed by: NURSE PRACTITIONER

## 2022-07-29 PROCEDURE — 1123F ACP DISCUSS/DSCN MKR DOCD: CPT | Performed by: NURSE PRACTITIONER

## 2022-07-29 RX ORDER — CEPHALEXIN 500 MG/1
500 CAPSULE ORAL 2 TIMES DAILY
Qty: 14 CAPSULE | Refills: 0 | Status: SHIPPED | OUTPATIENT
Start: 2022-07-29 | End: 2022-08-05

## 2022-07-29 RX ORDER — PHENAZOPYRIDINE HYDROCHLORIDE 200 MG/1
200 TABLET, FILM COATED ORAL 3 TIMES DAILY PRN
Qty: 9 TABLET | Refills: 0 | Status: SHIPPED | OUTPATIENT
Start: 2022-07-29 | End: 2022-08-01

## 2022-07-29 ASSESSMENT — ENCOUNTER SYMPTOMS
COUGH: 0
SINUS PAIN: 0
VOMITING: 0
ABDOMINAL PAIN: 0
SORE THROAT: 0
TROUBLE SWALLOWING: 0
BACK PAIN: 0
CONSTIPATION: 0
SHORTNESS OF BREATH: 0
ABDOMINAL DISTENTION: 0
NAUSEA: 0
WHEEZING: 0
CHEST TIGHTNESS: 0
COLOR CHANGE: 0
DIARRHEA: 0
RHINORRHEA: 0
SINUS PRESSURE: 0

## 2022-07-29 NOTE — PROGRESS NOTES
[facility] Encounter      [facility]       Chief Complaint   Patient presents with    Other     Burning, frequency, urgency of urinating. Sx started last week        Nurses Notes reviewed and I agree except as noted in the HPI. HISTORY OF PRESENT ILLNESS   Matt Rizzo is a 80 y.o. female who presents   Urinary Tract Infection   This is a new problem. The current episode started in the past 7 days. The problem occurs every urination. The problem has been gradually worsening. The quality of the pain is described as burning and aching. The pain is mild. There has been no fever. There is No history of pyelonephritis. Associated symptoms include frequency and urgency. Pertinent negatives include no chills, discharge, flank pain, hematuria, hesitancy, nausea, possible pregnancy, sweats or vomiting. She has tried increased fluids (cranberry juice) for the symptoms. There is no history of catheterization, kidney stones, recurrent UTIs, a single kidney, urinary stasis or a urological procedure. REVIEW OF SYSTEMS     Review of Systems   Constitutional:  Negative for activity change, appetite change, chills, diaphoresis, fatigue and fever. HENT:  Negative for congestion, ear pain, postnasal drip, rhinorrhea, sinus pressure, sinus pain, sore throat and trouble swallowing. Eyes:  Negative for visual disturbance. Respiratory:  Negative for cough, chest tightness, shortness of breath and wheezing. Cardiovascular:  Negative for chest pain and palpitations. Gastrointestinal:  Negative for abdominal distention, abdominal pain, constipation, diarrhea, nausea and vomiting. Genitourinary:  Positive for dysuria, frequency and urgency. Negative for difficulty urinating, flank pain, hematuria and hesitancy. Musculoskeletal:  Negative for arthralgias, back pain, myalgias, neck pain and neck stiffness. Skin:  Negative for color change and rash.    Neurological:  Negative for dizziness, tremors, seizures, syncope, speech difficulty, weakness, light-headedness, numbness and headaches. PAST MEDICAL HISTORY         Diagnosis Date    Bladder prolapse, female, acquired     Chest pain 10/14/2017    Degenerative arthritis of cervical spine 6/29/2012    Diverticulosis     ALEJANDRE (dyspnea on exertion) 10/14/2017    ELIZABETH (generalized anxiety disorder)     Generalized osteoarthritis of multiple sites     knees and neck    Heart palpitations 11/13/2017    History of bone density study 09/2014    Hyperlipidemia 6/29/2012    Hypertension 6/29/2012    Hypothyroidism 6/29/2012    Internal hemorrhoid     Osteopenia 09/2014    Seborrheic dermatitis of scalp        SURGICAL HISTORY     Patient  has a past surgical history that includes Colonoscopy (12/15/05); bladder suspension (11/2015); Carpal tunnel release (Right); Hysterectomy; Cystocele repair; Rectocele repair; ARTHROSCOPY / ARTHROTOMY KNEE (Right, 10/14/2016); and Cataract removal with implant (Bilateral).     CURRENT MEDICATIONS       Previous Medications    BUSPIRONE (BUSPAR) 5 MG TABLET    TAKE 1 TABLET TWICE A DAY AS NEEDED FOR ANXIETY    CALCIUM CARBONATE 600 MG TABS TABLET    Take 1 tablet by mouth daily    CELECOXIB (CELEBREX) 200 MG CAPSULE    TAKE 1 CAPSULE DAILY    CPAP MACHINE MISC    by Does not apply route Auto CPAP 5-10 cm of H2O    HANDICAP PLACARD MISC    by Does not apply route Expires 5 years from date    HANDICAP PLACARD MISC    by Does not apply route Exp 5 years    LEVOTHYROXINE (SYNTHROID) 50 MCG TABLET    TAKE 1 TABLET ON MONDAY, WEDNESDAY, AND FRIDAY ALTERNATING WITH THE 75 MCG    LEVOTHYROXINE (SYNTHROID) 75 MCG TABLET    TAKE 1 TABLET ON SUNDAY, TUESDAY, THURSDAY AND SATURDAY    METOPROLOL SUCCINATE (TOPROL XL) 50 MG EXTENDED RELEASE TABLET    TAKE 1 TABLET TWICE A DAY    PRAVASTATIN (PRAVACHOL) 40 MG TABLET    TAKE 1 TABLET NIGHTLY    RESPIRATORY THERAPY SUPPLIES RISSA    New CPAP nasal  mask and supplies    RESPIRATORY THERAPY SUPPLIES RISSA    New CPAP mask and supplies    VITAMIN D (CHOLECALCIFEROL) 1000 UNIT TABS TABLET    Take 1,000 Units by mouth daily       ALLERGIES     Patientis has No Known Allergies. FAMILY HISTORY     Patient's family history includes Cancer in her brother, mother, and paternal grandmother; Diabetes in an other family member; Diabetes type 2  in her maternal grandmother; Heart Disease in her father and maternal grandfather. SOCIAL HISTORY     Patient  reports that she quit smoking about 49 years ago. Her smoking use included cigarettes. She has a 7.50 pack-year smoking history. She has never used smokeless tobacco. She reports that she does not drink alcohol and does not use drugs. PHYSICAL EXAM     ED TRIAGE VITALS  BP: 118/70, Temp: 98.6 °F (37 °C), Heart Rate: 60,  , SpO2: 98 %  Physical Exam  Constitutional:       General: She is not in acute distress. Appearance: Normal appearance. She is obese. She is not ill-appearing, toxic-appearing or diaphoretic. HENT:      Head: Normocephalic and atraumatic. Eyes:      General:         Right eye: No discharge. Left eye: No discharge. Conjunctiva/sclera: Conjunctivae normal.      Pupils: Pupils are equal, round, and reactive to light. Cardiovascular:      Rate and Rhythm: Normal rate and regular rhythm. Pulses: Normal pulses. Pulmonary:      Effort: Pulmonary effort is normal.   Abdominal:      General: There is no distension. Palpations: Abdomen is soft. Tenderness: There is no abdominal tenderness. Musculoskeletal:         General: No tenderness or signs of injury. Normal range of motion. Cervical back: Normal range of motion and neck supple. No rigidity or tenderness. Skin:     General: Skin is warm and dry. Capillary Refill: Capillary refill takes less than 2 seconds. Neurological:      General: No focal deficit present.       Mental Status: She is alert and oriented to person, place, and time. Mental status is at baseline. Cranial Nerves: No cranial nerve deficit. Sensory: No sensory deficit. Motor: No weakness. Coordination: Coordination normal.       DIAGNOSTICRESULTS   Labs:       IMAGING:    URGENT CARE COURSE:     Vitals:    07/29/22 1000   BP: 118/70   Pulse: 60   Temp: 98.6 °F (37 °C)   SpO2: 98%   Weight: 202 lb (91.6 kg)   Height: 5' 2\" (1.575 m)         PROCEDURES:  None  FINAL IMPRESSION      1. Acute cystitis without hematuria        DISPOSITION/PLAN   DISPOSITION    Extended discharge instructions provided to patient including signs, symptoms and red flags that they should return to the emergency department. They express good understanding. Standard anticipatory guidance given to patient upon discharge. Have given them a specific time frame in which to follow-up and who to follow-up with. I have also advised them that they should return to the emergency department if they get worse, or not getting better or develop any new or concerning symptoms. Patient demonstrates understanding and all questions were answered. PATIENT REFERRED TO:  Return in about 1 week (around 8/5/2022). DISCHARGE MEDICATIONS:  New Prescriptions    No medications on file     Cannot display discharge medications since this is not an admission.       Maria Barrientos, LYNDA - CNP

## 2022-07-31 LAB
ORGANISM: ABNORMAL
URINE CULTURE, ROUTINE: ABNORMAL
URINE CULTURE, ROUTINE: ABNORMAL

## 2022-11-17 ENCOUNTER — OFFICE VISIT (OUTPATIENT)
Dept: FAMILY MEDICINE CLINIC | Age: 82
End: 2022-11-17
Payer: MEDICARE

## 2022-11-17 VITALS
WEIGHT: 203 LBS | SYSTOLIC BLOOD PRESSURE: 126 MMHG | HEIGHT: 62 IN | HEART RATE: 63 BPM | BODY MASS INDEX: 37.36 KG/M2 | DIASTOLIC BLOOD PRESSURE: 74 MMHG | OXYGEN SATURATION: 97 %

## 2022-11-17 DIAGNOSIS — M47.812 OSTEOARTHRITIS OF CERVICAL SPINE, UNSPECIFIED SPINAL OSTEOARTHRITIS COMPLICATION STATUS: ICD-10-CM

## 2022-11-17 DIAGNOSIS — M79.601 RIGHT ARM PAIN: ICD-10-CM

## 2022-11-17 DIAGNOSIS — E03.9 ACQUIRED HYPOTHYROIDISM: ICD-10-CM

## 2022-11-17 DIAGNOSIS — F41.1 GAD (GENERALIZED ANXIETY DISORDER): ICD-10-CM

## 2022-11-17 DIAGNOSIS — G47.30 SLEEP APNEA, UNSPECIFIED TYPE: Primary | ICD-10-CM

## 2022-11-17 DIAGNOSIS — D45 POLYCYTHEMIA VERA (HCC): ICD-10-CM

## 2022-11-17 PROCEDURE — 1123F ACP DISCUSS/DSCN MKR DOCD: CPT | Performed by: NURSE PRACTITIONER

## 2022-11-17 PROCEDURE — 99214 OFFICE O/P EST MOD 30 MIN: CPT | Performed by: NURSE PRACTITIONER

## 2022-11-17 PROCEDURE — 3074F SYST BP LT 130 MM HG: CPT | Performed by: NURSE PRACTITIONER

## 2022-11-17 PROCEDURE — 3078F DIAST BP <80 MM HG: CPT | Performed by: NURSE PRACTITIONER

## 2022-11-17 RX ORDER — CELECOXIB 200 MG/1
CAPSULE ORAL
Qty: 90 CAPSULE | Refills: 3 | Status: SHIPPED | OUTPATIENT
Start: 2022-11-17

## 2022-11-17 RX ORDER — LEVOTHYROXINE SODIUM 0.05 MG/1
TABLET ORAL
Qty: 39 TABLET | Refills: 3 | Status: SHIPPED | OUTPATIENT
Start: 2022-11-17

## 2022-11-17 RX ORDER — LEVOTHYROXINE SODIUM 0.07 MG/1
TABLET ORAL
Qty: 52 TABLET | Refills: 3 | Status: SHIPPED | OUTPATIENT
Start: 2022-11-17

## 2022-11-17 ASSESSMENT — ENCOUNTER SYMPTOMS
EYE PAIN: 0
ABDOMINAL PAIN: 0
CONSTIPATION: 0
COLOR CHANGE: 0
CHEST TIGHTNESS: 0
COUGH: 0
SHORTNESS OF BREATH: 0
DIARRHEA: 0
TROUBLE SWALLOWING: 0
BACK PAIN: 0

## 2022-11-17 NOTE — PROGRESS NOTES
Subjective  Chief Complaint   Patient presents with    Hypertension     6 mo f/u     Anxiety    Sleep Apnea     Pt states she needs new mask and supplies for her CPAP machine       Hypertension  This is a chronic problem. The current episode started more than 1 year ago. The problem is unchanged. The problem is controlled. Associated symptoms include anxiety and palpitations. Pertinent negatives include no chest pain, malaise/fatigue, peripheral edema or shortness of breath. Agents associated with hypertension include thyroid hormones. Risk factors for coronary artery disease include obesity, sedentary lifestyle, stress, post-menopausal state and dyslipidemia. Past treatments include beta blockers. The current treatment provides significant improvement. There are no compliance problems. There is no history of CAD/MI, heart failure or PVD. Identifiable causes of hypertension include a thyroid problem. There is no history of chronic renal disease or a hypertension causing med. Anxiety  Symptoms include nervous/anxious behavior and palpitations. Patient reports no chest pain, dizziness or shortness of breath. Palpitations-she is continuing to follow with Dr. Bill Flynn.    Right shoulder/arm pain-was caring for  at home and not sure if that triggered it or not, taking celebrex and tylenol PRN which does help, has also applied cbd oil, has been ambulating more with cane which she holds with her right hand. Anxiety-she is taking the buspar twice daily every day, feels she is doing okay with that.  recently passed away 2 weeks ago so is grieving, but feels she is okay. COPD-doing okay, does get short of breath sometimes. She is no longer following with Dr. Yuliana Diana per her choice. Hypothyroidism-taking synthroid as prescribed. Polycythemia-follows with Dr. Romana Meng every 3 months. Did miss her f/u appt with him d/t her  recently passing, does not have f/u until January now.     Needs new cpap mask and pillows. Goes through [employer] [facility].      Past Medical History:   Diagnosis Date    Bladder prolapse, female, acquired     Chest pain 10/14/2017    Degenerative arthritis of cervical spine 6/29/2012    Diverticulosis     ALEJANDRE (dyspnea on exertion) 10/14/2017    ELIZABETH (generalized anxiety disorder)     Generalized osteoarthritis of multiple sites     knees and neck    Heart palpitations 11/13/2017    History of bone density study 09/2014    Hyperlipidemia 6/29/2012    Hypertension 6/29/2012    Hypothyroidism 6/29/2012    Internal hemorrhoid     Osteopenia 09/2014    Seborrheic dermatitis of scalp      Patient Active Problem List    Diagnosis Date Noted    Pulmonary hypertension ([facility] [address] 02/15/2022    Chronic obstructive pulmonary disease, unspecified COPD type ([facility] [address] 08/03/2021    Morbidly obese ([facility] [address] 09/03/2020    Sleep apnea 09/03/2019    Obesity (BMI 30-39.9) 09/03/2019    Polycythemia 09/03/2019    Heart palpitations 05/20/2019    Mixed hyperlipidemia 05/20/2019    ALEJANDRE (dyspnea on exertion) 05/20/2019    Elevated hemoglobin ([facility]CC) 05/10/2019    Generalized osteoarthritis of multiple sites     ELIZABETH (generalized anxiety disorder)     Seborrheic dermatitis of scalp     Osteoarthritis of knees, bilateral     Hypertension 06/29/2012    Hyperlipidemia 06/29/2012    Hypothyroidism 06/29/2012    Degenerative arthritis of cervical spine 06/29/2012     Past Surgical History:   Procedure Laterality Date    ARTHROSCOPY / ARTHROTOMY KNEE Right 10/14/2016    RIGHT KNEE ARTHROSCOPY / LOOSE BODY / PAT ON ADMIT performed by Franci Rodrigues MD at  [address]  11/2015    CARPAL TUNNEL RELEASE Right     CATARACT REMOVAL WITH IMPLANT Bilateral     COLONOSCOPY  12/15/05    CYSTOCELE REPAIR      HYSTERECTOMY (CERVIX STATUS UNKNOWN)      RECTOCELE REPAIR       Family History   Problem Relation Age of Onset    Cancer Mother         Liposarcoma    Heart Disease Father         Heart failure, Myelodysplasia Cancer Brother         Kidney cancer    Diabetes type 2  Maternal Grandmother     Heart Disease Maternal Grandfather     Cancer Paternal Grandmother         Bone cancer    Diabetes Other         parkinson's disease     Social History     Socioeconomic History    Marital status:      Spouse name: None    Number of children: 2    Years of education: None    Highest education level: None   Occupational History    Occupation:    Tobacco Use    Smoking status: Former     Packs/day: 0.50     Years: 15.00     Pack years: 7.50     Types: Cigarettes     Quit date: 1972     Years since quittin.2    Smokeless tobacco: Never   Substance and Sexual Activity    Alcohol use: No    Drug use: No   Social History Narrative    Has one son and one daughter.  is a few years older and now has dementia and is on aricept.       Social Determinants of Health     Financial Resource Strain: Low Risk     Difficulty of Paying Living Expenses: Not hard at all   Food Insecurity: No Food Insecurity    Worried About Running Out of Food in the Last Year: Never true    Ran Out of Food in the Last Year: Never true   Physical Activity: Unknown    Days of Exercise per Week: 0 days     Current Outpatient Medications on File Prior to Visit   Medication Sig Dispense Refill    pravastatin (PRAVACHOL) 40 MG tablet TAKE 1 TABLET NIGHTLY 90 tablet 3    busPIRone (BUSPAR) 5 MG tablet TAKE 1 TABLET TWICE A DAY AS NEEDED FOR ANXIETY 180 tablet 3    Handicap Placard MISC by Does not apply route Expires 5 years from date 1 each 0    metoprolol succinate (TOPROL XL) 50 MG extended release tablet TAKE 1 TABLET TWICE A  tablet 3    Respiratory Therapy Supplies RISSA New CPAP mask and supplies 1 Device 0    CPAP Machine MISC by Does not apply route Auto CPAP 5-10 cm of H2O 1 each 0    calcium carbonate 600 MG TABS tablet Take 1 tablet by mouth daily      vitamin D (CHOLECALCIFEROL) 1000 UNIT TABS tablet Take 1,000 Units by mouth daily       No current facility-administered medications on file prior to visit. No Known Allergies    Review of Systems   Constitutional:  Negative for activity change, appetite change, chills, diaphoresis, fatigue, fever and malaise/fatigue. HENT:  Negative for congestion, ear pain, hearing loss and trouble swallowing. Eyes:  Negative for pain and visual disturbance. Respiratory:  Negative for cough, chest tightness and shortness of breath. Cardiovascular:  Positive for palpitations. Negative for chest pain and leg swelling. Gastrointestinal:  Negative for abdominal pain, constipation and diarrhea. Endocrine: Negative for polydipsia, polyphagia and polyuria. Genitourinary:  Negative for difficulty urinating and dysuria. Musculoskeletal:  Positive for arthralgias. Negative for back pain. Skin:  Negative for color change and rash. Neurological:  Negative for dizziness and light-headedness. Psychiatric/Behavioral:  Positive for dysphoric mood. The patient is nervous/anxious. Objective  Vitals:    11/17/22 0957   BP: 126/74   Site: Left Upper Arm   Position: Sitting   Cuff Size: Medium Adult   Pulse: 63   SpO2: 97%   Weight: 203 lb (92.1 kg)   Height: 5' 2\" (1.575 m)     Physical Exam  Constitutional:       General: She is not in acute distress. Appearance: Normal appearance. She is obese. She is not ill-appearing, toxic-appearing or diaphoretic. HENT:      Head: Normocephalic and atraumatic. Right Ear: External ear normal.      Left Ear: External ear normal.      Nose: Nose normal. No congestion or rhinorrhea. Eyes:      Extraocular Movements: Extraocular movements intact. Conjunctiva/sclera: Conjunctivae normal.      Pupils: Pupils are equal, round, and reactive to light. Cardiovascular:      Rate and Rhythm: Normal rate and regular rhythm. Pulses: Normal pulses. Heart sounds: Normal heart sounds. No murmur heard.   Pulmonary:      Effort: Pulmonary effort is normal. No respiratory distress. Breath sounds: Normal breath sounds. No stridor. No wheezing, rhonchi or rales. Chest:      Chest wall: No tenderness. Musculoskeletal:         General: Normal range of motion. Cervical back: Normal range of motion and neck supple. No tenderness. Right lower leg: No edema. Left lower leg: No edema. Lymphadenopathy:      Cervical: No cervical adenopathy. Skin:     General: Skin is warm. Capillary Refill: Capillary refill takes less than 2 seconds. Coloration: Skin is not jaundiced. Findings: No erythema or lesion. Neurological:      General: No focal deficit present. Mental Status: She is alert and oriented to person, place, and time. Mental status is at baseline. Cranial Nerves: No cranial nerve deficit. Coordination: Coordination normal.      Gait: Gait normal.   Psychiatric:         Mood and Affect: Mood normal.         Behavior: Behavior normal.         Thought Content: Thought content normal.         Judgment: Judgment normal.       Assessment& Plan     Diagnosis Orders   1. Sleep apnea, unspecified type        2. Osteoarthritis of cervical spine, unspecified spinal osteoarthritis complication status  celecoxib (CELEBREX) 200 MG capsule      3. Acquired hypothyroidism  levothyroxine (SYNTHROID) 50 MCG tablet    levothyroxine (SYNTHROID) 75 MCG tablet      4. ELIZABETH (generalized anxiety disorder)        5. Polycythemia vera ([address]        6. Right arm pain          Continue current regimen. Rest, ice/heat for right upper arm, pain likely related to over use with cane. F/u if no improvement. Continue to follow with specialists. F/u in 4 months or sooner PRN. Side effects, adverse effects of the medication prescribed today, as well as treatment plan/ rationale and result expectations have been discussed with the patient who expresses understanding and desires to proceed.     Close follow up to evaluate treatment results and for coordination of care. I have reviewed the patient's medical history in detail and updated the computerized patient record. As always, patient is advised that if symptoms worsen in any way they will proceed to the nearest emergency room. No orders of the defined types were placed in this encounter. Orders Placed This Encounter   Medications    celecoxib (CELEBREX) 200 MG capsule     Sig: TAKE 1 CAPSULE DAILY     Dispense:  90 capsule     Refill:  3    levothyroxine (SYNTHROID) 50 MCG tablet     Sig: TAKE 1 TABLET ON MONDAY, WEDNESDAY, AND FRIDAY ALTERNATING WITH THE 75 MCG     Dispense:  39 tablet     Refill:  3    levothyroxine (SYNTHROID) 75 MCG tablet     Sig: TAKE 1 TABLET ON SUNDAY, TUESDAY, THURSDAY AND SATURDAY     Dispense:  52 tablet     Refill:  3       Medications Discontinued During This Encounter   Medication Reason    Handicap Placard MISC LIST CLEANUP    Respiratory Therapy Supplies RISSA LIST CLEANUP    celecoxib (CELEBREX) 200 MG capsule REORDER    levothyroxine (SYNTHROID) 75 MCG tablet REORDER    levothyroxine (SYNTHROID) 50 MCG tablet REORDER       Return in about 6 months (around 5/17/2023) for check up.     Shad Sullivan, APRN - CNP

## 2023-04-20 ENCOUNTER — OFFICE VISIT (OUTPATIENT)
Dept: CARDIOLOGY CLINIC | Age: 83
End: 2023-04-20
Payer: MEDICARE

## 2023-04-20 VITALS
DIASTOLIC BLOOD PRESSURE: 80 MMHG | SYSTOLIC BLOOD PRESSURE: 126 MMHG | WEIGHT: 206.8 LBS | HEART RATE: 66 BPM | HEIGHT: 62 IN | OXYGEN SATURATION: 91 % | BODY MASS INDEX: 38.05 KG/M2

## 2023-04-20 DIAGNOSIS — I10 ESSENTIAL HYPERTENSION: Primary | ICD-10-CM

## 2023-04-20 PROCEDURE — 93000 ELECTROCARDIOGRAM COMPLETE: CPT | Performed by: INTERNAL MEDICINE

## 2023-04-20 PROCEDURE — 1123F ACP DISCUSS/DSCN MKR DOCD: CPT | Performed by: INTERNAL MEDICINE

## 2023-04-20 PROCEDURE — 99214 OFFICE O/P EST MOD 30 MIN: CPT | Performed by: INTERNAL MEDICINE

## 2023-04-20 PROCEDURE — 3074F SYST BP LT 130 MM HG: CPT | Performed by: INTERNAL MEDICINE

## 2023-04-20 PROCEDURE — 3079F DIAST BP 80-89 MM HG: CPT | Performed by: INTERNAL MEDICINE

## 2023-04-20 ASSESSMENT — ENCOUNTER SYMPTOMS
EYES NEGATIVE: 1
BLOOD IN STOOL: 0
CHEST TIGHTNESS: 0
WHEEZING: 0
COUGH: 0
STRIDOR: 0
GASTROINTESTINAL NEGATIVE: 1
NAUSEA: 0

## 2023-04-20 NOTE — PROGRESS NOTES
Problem Relation Age of Onset    Cancer Mother         Liposarcoma    Heart Disease Father         Heart failure, Myelodysplasia    Cancer Brother         Kidney cancer    Diabetes type 2  Maternal Grandmother     Heart Disease Maternal Grandfather     Cancer Paternal Grandmother         Bone cancer    Diabetes Other         parkinson's disease       Social History     Socioeconomic History    Marital status:     Number of children: 2   Occupational History    Occupation:    Tobacco Use    Smoking status: Former     Packs/day: 0.50     Years: 15.00     Pack years: 7.50     Types: Cigarettes     Quit date: 1972     Years since quittin.6    Smokeless tobacco: Never   Substance and Sexual Activity    Alcohol use: No    Drug use: No   Social History Narrative    Has one son and one daughter.  is a few years older and now has dementia and is on aricept.       Social Determinants of Health     Physical Activity: Unknown    Days of Exercise per Week: 0 days       No Known Allergies    Current Outpatient Medications   Medication Sig Dispense Refill    celecoxib (CELEBREX) 200 MG capsule TAKE 1 CAPSULE DAILY 90 capsule 3    levothyroxine (SYNTHROID) 50 MCG tablet TAKE 1 TABLET ON MONDAY, WEDNESDAY, AND FRIDAY ALTERNATING WITH THE 75 MCG 39 tablet 3    levothyroxine (SYNTHROID) 75 MCG tablet TAKE 1 TABLET ON , TUESDAY, THURSDAY AND SATURDAY 52 tablet 3    pravastatin (PRAVACHOL) 40 MG tablet TAKE 1 TABLET NIGHTLY 90 tablet 3    busPIRone (BUSPAR) 5 MG tablet TAKE 1 TABLET TWICE A DAY AS NEEDED FOR ANXIETY 180 tablet 3    Handicap Placard MISC by Does not apply route Expires 5 years from date 1 each 0    metoprolol succinate (TOPROL XL) 50 MG extended release tablet TAKE 1 TABLET TWICE A  tablet 3    Respiratory Therapy Supplies RISSA New CPAP mask and supplies 1 Device 0    CPAP Machine MISC by Does not apply route Auto CPAP 5-10 cm of H2O 1 each 0    calcium carbonate 600 MG

## 2023-04-21 DIAGNOSIS — I10 ESSENTIAL HYPERTENSION: ICD-10-CM

## 2023-04-24 RX ORDER — METOPROLOL SUCCINATE 50 MG/1
50 TABLET, EXTENDED RELEASE ORAL 2 TIMES DAILY
Qty: 180 TABLET | Refills: 3 | Status: SHIPPED | OUTPATIENT
Start: 2023-04-24

## 2023-05-18 ENCOUNTER — OFFICE VISIT (OUTPATIENT)
Dept: FAMILY MEDICINE CLINIC | Age: 83
End: 2023-05-18
Payer: MEDICARE

## 2023-05-18 VITALS
WEIGHT: 207 LBS | HEIGHT: 62 IN | BODY MASS INDEX: 38.09 KG/M2 | OXYGEN SATURATION: 97 % | SYSTOLIC BLOOD PRESSURE: 134 MMHG | DIASTOLIC BLOOD PRESSURE: 84 MMHG | HEART RATE: 66 BPM

## 2023-05-18 DIAGNOSIS — J44.9 CHRONIC OBSTRUCTIVE PULMONARY DISEASE, UNSPECIFIED COPD TYPE (HCC): ICD-10-CM

## 2023-05-18 DIAGNOSIS — M47.812 OSTEOARTHRITIS OF CERVICAL SPINE, UNSPECIFIED SPINAL OSTEOARTHRITIS COMPLICATION STATUS: ICD-10-CM

## 2023-05-18 DIAGNOSIS — F41.1 GAD (GENERALIZED ANXIETY DISORDER): ICD-10-CM

## 2023-05-18 DIAGNOSIS — I27.20 PULMONARY HYPERTENSION (HCC): ICD-10-CM

## 2023-05-18 DIAGNOSIS — E78.2 MIXED HYPERLIPIDEMIA: ICD-10-CM

## 2023-05-18 DIAGNOSIS — D45 POLYCYTHEMIA VERA (HCC): ICD-10-CM

## 2023-05-18 DIAGNOSIS — E03.9 ACQUIRED HYPOTHYROIDISM: Primary | ICD-10-CM

## 2023-05-18 DIAGNOSIS — E66.01 SEVERE OBESITY (BMI 35.0-39.9) WITH COMORBIDITY (HCC): ICD-10-CM

## 2023-05-18 DIAGNOSIS — G47.30 SLEEP APNEA, UNSPECIFIED TYPE: ICD-10-CM

## 2023-05-18 PROCEDURE — 3079F DIAST BP 80-89 MM HG: CPT | Performed by: NURSE PRACTITIONER

## 2023-05-18 PROCEDURE — 3075F SYST BP GE 130 - 139MM HG: CPT | Performed by: NURSE PRACTITIONER

## 2023-05-18 PROCEDURE — 99214 OFFICE O/P EST MOD 30 MIN: CPT | Performed by: NURSE PRACTITIONER

## 2023-05-18 PROCEDURE — 1123F ACP DISCUSS/DSCN MKR DOCD: CPT | Performed by: NURSE PRACTITIONER

## 2023-05-18 SDOH — ECONOMIC STABILITY: FOOD INSECURITY: WITHIN THE PAST 12 MONTHS, THE FOOD YOU BOUGHT JUST DIDN'T LAST AND YOU DIDN'T HAVE MONEY TO GET MORE.: NEVER TRUE

## 2023-05-18 SDOH — ECONOMIC STABILITY: FOOD INSECURITY: WITHIN THE PAST 12 MONTHS, YOU WORRIED THAT YOUR FOOD WOULD RUN OUT BEFORE YOU GOT MONEY TO BUY MORE.: NEVER TRUE

## 2023-05-18 SDOH — ECONOMIC STABILITY: INCOME INSECURITY: HOW HARD IS IT FOR YOU TO PAY FOR THE VERY BASICS LIKE FOOD, HOUSING, MEDICAL CARE, AND HEATING?: NOT HARD AT ALL

## 2023-05-18 SDOH — ECONOMIC STABILITY: HOUSING INSECURITY
IN THE LAST 12 MONTHS, WAS THERE A TIME WHEN YOU DID NOT HAVE A STEADY PLACE TO SLEEP OR SLEPT IN A SHELTER (INCLUDING NOW)?: NO

## 2023-05-18 ASSESSMENT — ENCOUNTER SYMPTOMS
COUGH: 0
EYE PAIN: 0
COLOR CHANGE: 0
TROUBLE SWALLOWING: 0
ABDOMINAL PAIN: 0
CONSTIPATION: 0
BACK PAIN: 0
CHEST TIGHTNESS: 0
DIARRHEA: 0
SHORTNESS OF BREATH: 0

## 2023-05-18 ASSESSMENT — PATIENT HEALTH QUESTIONNAIRE - PHQ9
SUM OF ALL RESPONSES TO PHQ QUESTIONS 1-9: 1
SUM OF ALL RESPONSES TO PHQ9 QUESTIONS 1 & 2: 1
2. FEELING DOWN, DEPRESSED OR HOPELESS: 1
SUM OF ALL RESPONSES TO PHQ QUESTIONS 1-9: 1
1. LITTLE INTEREST OR PLEASURE IN DOING THINGS: 0

## 2023-05-18 NOTE — PROGRESS NOTES
Subjective  Chief Complaint   Patient presents with    Anxiety     Check up    Hypothyroidism     Check up    Sleep Apnea     Check up       Hypertension  This is a chronic problem. The current episode started more than 1 year ago. The problem is unchanged. The problem is controlled. Associated symptoms include anxiety. Pertinent negatives include no chest pain, malaise/fatigue, palpitations, peripheral edema or shortness of breath. Agents associated with hypertension include thyroid hormones. Risk factors for coronary artery disease include obesity, sedentary lifestyle, stress, post-menopausal state and dyslipidemia. Past treatments include beta blockers. The current treatment provides significant improvement. There are no compliance problems. There is no history of CAD/MI, heart failure or PVD. Identifiable causes of hypertension include a thyroid problem. There is no history of chronic renal disease or a hypertension causing med. Anxiety  Patient reports no chest pain, dizziness, nervous/anxious behavior, palpitations or shortness of breath. Palpitations-she is continuing to follow with Dr. Barbara Doss. Arthritis worsening, not moving as much. Right shoulder and knee, does not want to see ortho. Anxiety-she is taking the buspar twice daily every day, feels she is doing okay with that.  recently passed away 6 months ago, daughter has now moved in with her. Feels her moods are improving. COPD-doing okay, does get short of breath sometimes. She is no longer following with Dr. Momo Arnold per her choice. Hypothyroidism-taking synthroid as prescribed. Polycythemia-was following with Dr. Alcon Alamo; however he retired.  Last visit was in January, last 2 checks were high so she did have to have phlebotomies      Past Medical History:   Diagnosis Date    Bladder prolapse, female, acquired     Chest pain 10/14/2017    Degenerative arthritis of cervical spine 6/29/2012    Diverticulosis     ALEJANDRE (dyspnea

## 2023-06-03 DIAGNOSIS — F41.1 GAD (GENERALIZED ANXIETY DISORDER): ICD-10-CM

## 2023-06-05 RX ORDER — BUSPIRONE HYDROCHLORIDE 5 MG/1
TABLET ORAL
Qty: 180 TABLET | Refills: 1 | Status: SHIPPED | OUTPATIENT
Start: 2023-06-05

## 2023-06-07 DIAGNOSIS — I27.20 PULMONARY HYPERTENSION (HCC): ICD-10-CM

## 2023-06-07 DIAGNOSIS — E78.2 MIXED HYPERLIPIDEMIA: ICD-10-CM

## 2023-06-07 DIAGNOSIS — E03.9 ACQUIRED HYPOTHYROIDISM: ICD-10-CM

## 2023-06-07 LAB
ALBUMIN SERPL-MCNC: 3.9 G/DL (ref 3.5–4.6)
ALP SERPL-CCNC: 81 U/L (ref 40–130)
ALT SERPL-CCNC: 10 U/L (ref 0–33)
ANION GAP SERPL CALCULATED.3IONS-SCNC: 12 MEQ/L (ref 9–15)
AST SERPL-CCNC: 20 U/L (ref 0–35)
BILIRUB SERPL-MCNC: 0.5 MG/DL (ref 0.2–0.7)
BUN SERPL-MCNC: 16 MG/DL (ref 8–23)
CALCIUM SERPL-MCNC: 9.1 MG/DL (ref 8.5–9.9)
CHLORIDE SERPL-SCNC: 104 MEQ/L (ref 95–107)
CHOLEST SERPL-MCNC: 177 MG/DL (ref 0–199)
CO2 SERPL-SCNC: 25 MEQ/L (ref 20–31)
CREAT SERPL-MCNC: 0.77 MG/DL (ref 0.5–0.9)
GLOBULIN SER CALC-MCNC: 3.2 G/DL (ref 2.3–3.5)
GLUCOSE SERPL-MCNC: 80 MG/DL (ref 70–99)
HDLC SERPL-MCNC: 51 MG/DL (ref 40–59)
LDLC SERPL CALC-MCNC: 95 MG/DL (ref 0–129)
POTASSIUM SERPL-SCNC: 4.9 MEQ/L (ref 3.4–4.9)
PROT SERPL-MCNC: 7.1 G/DL (ref 6.3–8)
SODIUM SERPL-SCNC: 141 MEQ/L (ref 135–144)
TRIGL SERPL-MCNC: 155 MG/DL (ref 0–150)
TSH REFLEX: 2.03 UIU/ML (ref 0.44–3.86)

## 2023-06-21 DIAGNOSIS — E78.2 MIXED HYPERLIPIDEMIA: ICD-10-CM

## 2023-06-22 RX ORDER — PRAVASTATIN SODIUM 40 MG
TABLET ORAL
Qty: 90 TABLET | Refills: 3 | Status: SHIPPED | OUTPATIENT
Start: 2023-06-22

## 2023-07-10 ENCOUNTER — TELEPHONE (OUTPATIENT)
Dept: FAMILY MEDICINE CLINIC | Age: 83
End: 2023-07-10

## 2023-11-28 ENCOUNTER — OFFICE VISIT (OUTPATIENT)
Dept: FAMILY MEDICINE CLINIC | Age: 83
End: 2023-11-28
Payer: MEDICARE

## 2023-11-28 VITALS
SYSTOLIC BLOOD PRESSURE: 124 MMHG | DIASTOLIC BLOOD PRESSURE: 68 MMHG | WEIGHT: 211 LBS | HEART RATE: 64 BPM | HEIGHT: 62 IN | OXYGEN SATURATION: 97 % | BODY MASS INDEX: 38.83 KG/M2

## 2023-11-28 DIAGNOSIS — F41.1 GAD (GENERALIZED ANXIETY DISORDER): ICD-10-CM

## 2023-11-28 DIAGNOSIS — E78.2 MIXED HYPERLIPIDEMIA: ICD-10-CM

## 2023-11-28 DIAGNOSIS — M47.812 OSTEOARTHRITIS OF CERVICAL SPINE, UNSPECIFIED SPINAL OSTEOARTHRITIS COMPLICATION STATUS: ICD-10-CM

## 2023-11-28 DIAGNOSIS — E03.9 ACQUIRED HYPOTHYROIDISM: ICD-10-CM

## 2023-11-28 PROCEDURE — 3078F DIAST BP <80 MM HG: CPT | Performed by: NURSE PRACTITIONER

## 2023-11-28 PROCEDURE — 99214 OFFICE O/P EST MOD 30 MIN: CPT | Performed by: NURSE PRACTITIONER

## 2023-11-28 PROCEDURE — 1123F ACP DISCUSS/DSCN MKR DOCD: CPT | Performed by: NURSE PRACTITIONER

## 2023-11-28 PROCEDURE — 3074F SYST BP LT 130 MM HG: CPT | Performed by: NURSE PRACTITIONER

## 2023-11-28 RX ORDER — LEVOTHYROXINE SODIUM 0.07 MG/1
TABLET ORAL
Qty: 52 TABLET | Refills: 3 | Status: SHIPPED | OUTPATIENT
Start: 2023-11-28

## 2023-11-28 RX ORDER — CELECOXIB 200 MG/1
CAPSULE ORAL
Qty: 90 CAPSULE | Refills: 3 | Status: SHIPPED | OUTPATIENT
Start: 2023-11-28

## 2023-11-28 RX ORDER — BUSPIRONE HYDROCHLORIDE 5 MG/1
TABLET ORAL
Qty: 180 TABLET | Refills: 1 | Status: SHIPPED | OUTPATIENT
Start: 2023-11-28

## 2023-11-28 RX ORDER — PRAVASTATIN SODIUM 40 MG
40 TABLET ORAL NIGHTLY
Qty: 90 TABLET | Refills: 3 | Status: SHIPPED | OUTPATIENT
Start: 2023-11-28

## 2023-11-28 RX ORDER — LEVOTHYROXINE SODIUM 0.05 MG/1
TABLET ORAL
Qty: 39 TABLET | Refills: 3 | Status: SHIPPED | OUTPATIENT
Start: 2023-11-28

## 2023-11-28 ASSESSMENT — ENCOUNTER SYMPTOMS
CHEST TIGHTNESS: 0
CONSTIPATION: 0
BACK PAIN: 0
ABDOMINAL PAIN: 0
EYE PAIN: 0
DIARRHEA: 0
COLOR CHANGE: 0
TROUBLE SWALLOWING: 0
COUGH: 0

## 2023-11-28 NOTE — PROGRESS NOTES
Subjective  Chief Complaint   Patient presents with    Hypothyroidism     Check up    Hypertension     Check up       Hypertension  This is a chronic problem. The current episode started more than 1 year ago. The problem is unchanged. The problem is controlled. Pertinent negatives include no malaise/fatigue or peripheral edema. Agents associated with hypertension include thyroid hormones. Risk factors for coronary artery disease include obesity, sedentary lifestyle, stress, post-menopausal state and dyslipidemia. Past treatments include beta blockers. The current treatment provides significant improvement. There are no compliance problems. There is no history of CAD/MI, heart failure or PVD. Identifiable causes of hypertension include a thyroid problem. There is no history of chronic renal disease or a hypertension causing med. Palpitations-she is continuing to follow with Dr. Rufina Lipscomb. Arthritis worsening, not moving as much. Right shoulder and knee, does not want to see ortho. Now left hip pain also for the last week. Anxiety-she is taking the buspar twice daily every day, feels she is doing okay with that. Daughter has now moved in with her. Feels her moods are improving. COPD-doing okay, does get short of breath sometimes. She is no longer following with Dr. Lorrie Abrams per her choice. Hypothyroidism-taking synthroid as prescribed. Polycythemia-Now following with Dr. Phoenix Mike, saw him a couple of weeks ago. H/H 16.6/49.8. Has recheck labs in January. States Dr. Phoenix Mike did not feel she needed a phlebotomy with her numbers where they are. States she was told her polycythemia is secondary to the LAKSHMI, uses cpap nightly.      Past Medical History:   Diagnosis Date    Bladder prolapse, female, acquired     Chest pain 10/14/2017    Degenerative arthritis of cervical spine 2012    Diverticulosis     ALEJANDRE (dyspnea on exertion) 10/14/2017    ELIZABETH (generalized anxiety disorder)     Generalized osteoarthritis

## 2024-01-11 ENCOUNTER — OFFICE VISIT (OUTPATIENT)
Dept: FAMILY MEDICINE CLINIC | Age: 84
End: 2024-01-11
Payer: MEDICARE

## 2024-01-11 ENCOUNTER — TELEPHONE (OUTPATIENT)
Dept: SURGERY | Age: 84
End: 2024-01-11

## 2024-01-11 VITALS
BODY MASS INDEX: 38.64 KG/M2 | WEIGHT: 210 LBS | OXYGEN SATURATION: 95 % | HEART RATE: 72 BPM | SYSTOLIC BLOOD PRESSURE: 138 MMHG | DIASTOLIC BLOOD PRESSURE: 82 MMHG | HEIGHT: 62 IN

## 2024-01-11 DIAGNOSIS — I27.20 PULMONARY HYPERTENSION (HCC): ICD-10-CM

## 2024-01-11 DIAGNOSIS — E66.01 SEVERE OBESITY (BMI 35.0-39.9) WITH COMORBIDITY (HCC): ICD-10-CM

## 2024-01-11 DIAGNOSIS — N61.0 CELLULITIS OF LEFT BREAST: Primary | ICD-10-CM

## 2024-01-11 DIAGNOSIS — J44.9 CHRONIC OBSTRUCTIVE PULMONARY DISEASE, UNSPECIFIED COPD TYPE (HCC): ICD-10-CM

## 2024-01-11 DIAGNOSIS — D45 POLYCYTHEMIA VERA (HCC): ICD-10-CM

## 2024-01-11 PROCEDURE — 3075F SYST BP GE 130 - 139MM HG: CPT | Performed by: NURSE PRACTITIONER

## 2024-01-11 PROCEDURE — 3079F DIAST BP 80-89 MM HG: CPT | Performed by: NURSE PRACTITIONER

## 2024-01-11 PROCEDURE — 99214 OFFICE O/P EST MOD 30 MIN: CPT | Performed by: NURSE PRACTITIONER

## 2024-01-11 PROCEDURE — 1123F ACP DISCUSS/DSCN MKR DOCD: CPT | Performed by: NURSE PRACTITIONER

## 2024-01-11 RX ORDER — AMOXICILLIN AND CLAVULANATE POTASSIUM 875; 125 MG/1; MG/1
1 TABLET, FILM COATED ORAL 2 TIMES DAILY
Qty: 14 TABLET | Refills: 0 | Status: SHIPPED | OUTPATIENT
Start: 2024-01-11 | End: 2024-01-18

## 2024-01-11 ASSESSMENT — PATIENT HEALTH QUESTIONNAIRE - PHQ9
SUM OF ALL RESPONSES TO PHQ QUESTIONS 1-9: 0
SUM OF ALL RESPONSES TO PHQ9 QUESTIONS 1 & 2: 0
SUM OF ALL RESPONSES TO PHQ QUESTIONS 1-9: 0
SUM OF ALL RESPONSES TO PHQ QUESTIONS 1-9: 0
2. FEELING DOWN, DEPRESSED OR HOPELESS: 0
1. LITTLE INTEREST OR PLEASURE IN DOING THINGS: 0
SUM OF ALL RESPONSES TO PHQ QUESTIONS 1-9: 0

## 2024-01-11 ASSESSMENT — ENCOUNTER SYMPTOMS
TROUBLE SWALLOWING: 0
COLOR CHANGE: 1
BREAST PAIN: 0
BACK PAIN: 0
DIARRHEA: 0
EYE PAIN: 0
CHEST TIGHTNESS: 0
CONSTIPATION: 0
SHORTNESS OF BREATH: 0
ABDOMINAL PAIN: 0
COUGH: 0

## 2024-01-11 NOTE — PROGRESS NOTES
Subjective  Chief Complaint   Patient presents with    Breast Problem     States that last night when she was getting ready for bed her LT breast was itching and when she looked at it, it was all red with a dime sized welt that was black in the middle. States that she put benadryl on it last night and this morning states that there was a little blood on her pajamas this morning.       Breast Problem  Chronicity:  New  Associated Symptoms: breast redness and skin change    Associated Symptoms: no breast mass, no breast pain, no breast discharge, no adenopathy, no swelling and no tenderness    Affected side:  Left  Onset:  Yesterday  Progression since onset:  Unchanged  Currently pregnant:  No  Current birth control:  None  History of hormonal contraceptive use: no    History of hormone replacement therapy: no    Practices self breast exam: no    Risk factors: obesity    Pt with large erythematous area on left breast. Denies pain/tenderness. Was unaware of erythematous area until last night when she had an itch in that area, and noticed some drainage when she scratched the area. Applied benadryl cream at that time. This morning did notice a small amount of blood on her pajamas, applied neosporin at that time. Continues to deny any pain and denies any itching or tenderness at this time. Pt denies fevers/chills.       Past Medical History:   Diagnosis Date    Bladder prolapse, female, acquired     Chest pain 10/14/2017    Degenerative arthritis of cervical spine 6/29/2012    Diverticulosis     ALEJANDRE (dyspnea on exertion) 10/14/2017    ELIZABETH (generalized anxiety disorder)     Generalized osteoarthritis of multiple sites     knees and neck    Heart palpitations 11/13/2017    History of bone density study 09/2014    Hyperlipidemia 6/29/2012    Hypertension 6/29/2012    Hypothyroidism 6/29/2012    Internal hemorrhoid     Osteopenia 09/2014    Seborrheic dermatitis of scalp      Patient Active Problem List    Diagnosis Date

## 2024-01-11 NOTE — TELEPHONE ENCOUNTER
I contacted the patient to schedule her referral from Bing Lester. She has been scheduled in our next clinic. Patient confirmed time/date/location.

## 2024-01-11 NOTE — TELEPHONE ENCOUNTER
Bing Lester would like this patient seen ASAP for cellulitis of left breast.  She is prescribing her an antibiotic.  Can you see her tomorrow or early next week?

## 2024-01-16 ENCOUNTER — OFFICE VISIT (OUTPATIENT)
Dept: SURGERY | Age: 84
End: 2024-01-16
Payer: MEDICARE

## 2024-01-16 VITALS
HEART RATE: 66 BPM | RESPIRATION RATE: 16 BRPM | WEIGHT: 212 LBS | HEIGHT: 62 IN | SYSTOLIC BLOOD PRESSURE: 122 MMHG | BODY MASS INDEX: 39.01 KG/M2 | TEMPERATURE: 97.6 F | DIASTOLIC BLOOD PRESSURE: 72 MMHG | OXYGEN SATURATION: 97 %

## 2024-01-16 DIAGNOSIS — E66.9 OBESITY, CLASS II, BMI 35-39.9: ICD-10-CM

## 2024-01-16 DIAGNOSIS — N60.82 SEBACEOUS CYST OF SKIN OF LEFT BREAST: Primary | ICD-10-CM

## 2024-01-16 DIAGNOSIS — Z12.31 ENCOUNTER FOR SCREENING MAMMOGRAM FOR BREAST CANCER: ICD-10-CM

## 2024-01-16 PROCEDURE — 3078F DIAST BP <80 MM HG: CPT | Performed by: SURGERY

## 2024-01-16 PROCEDURE — 1123F ACP DISCUSS/DSCN MKR DOCD: CPT | Performed by: SURGERY

## 2024-01-16 PROCEDURE — 99203 OFFICE O/P NEW LOW 30 MIN: CPT | Performed by: SURGERY

## 2024-01-16 PROCEDURE — 3074F SYST BP LT 130 MM HG: CPT | Performed by: SURGERY

## 2024-01-16 ASSESSMENT — ENCOUNTER SYMPTOMS
COUGH: 0
BACK PAIN: 1
VOMITING: 0
CHEST TIGHTNESS: 0
NAUSEA: 0
ABDOMINAL PAIN: 0
COLOR CHANGE: 1
SORE THROAT: 0
SHORTNESS OF BREATH: 0

## 2024-01-16 NOTE — PROGRESS NOTES
NEW BREAST PATIENT         SERVICE DATE: 1/16/24  SERVICE TIME:  9:15 AM EST    REFERRED BY:  Bing Lester APRN - CNP  REASON FOR TODAY'S VISIT:    Chief Complaint   Patient presents with    New Patient     Saw Bing Lester CNP on 1/11/2024 for left breast dime sized welt under the left breast, it was black in the middle and the left breast was red and itchy. She was started on Augmentin. The left breast welt black area drained a small amount of bloody type drainage,no odor noted, the pain is less but has an occasional sharp twinge, still has some itchiness.Denies fever or chills. Has 2 days of Augmentin left.      CHAPERONE WAS OFFERED, PATIENT RESPONDED: no    HISTORY AND CHIEF COMPLAINT:  Megan Garcia is a 83 y.o.  female who is here for a New Patient (Saw Bing Lester CNP on 1/11/2024 for left breast dime sized welt under the left breast, it was black in the middle and the left breast was red and itchy. She was started on Augmentin. The left breast welt black area drained a small amount of bloody type drainage,no odor noted, the pain is less but has an occasional sharp twinge, still has some itchiness.Denies fever or chills. Has 2 days of Augmentin left.)  She denies any fevers or chills, area opened up and now drained. She feels it is better  Hasn't had a mammogram since 2016 because she was told she didn't need one according to the patient.      BREAST HISTORY  Her past breast history (prior to this encounter) is as follows:  Abnormal mammogram:   No  Abnormal Breast US:  No  Breast biopsy:    No  Breast cysts:    No  Breast surgery:    No  Breast cancer              No  History of Breastfeeding: No   Currently Breastfeeding: No      RISK FACTORS FOR BREAST CANCER:  Family History of Breast Cancer: No.  History of ovarian cancer: no  Ashkenazi Ancestry: no  Age at the birth of first child: 19  Age at the onset of menses: 12  Age at menopause: Complete Hysterectomy age 42   Hormonal

## 2024-01-19 ENCOUNTER — TELEPHONE (OUTPATIENT)
Dept: FAMILY MEDICINE CLINIC | Age: 84
End: 2024-01-19

## 2024-02-12 ENCOUNTER — HOSPITAL ENCOUNTER (OUTPATIENT)
Dept: WOMENS IMAGING | Age: 84
Discharge: HOME | End: 2024-02-14
Payer: MEDICARE

## 2024-02-12 DIAGNOSIS — Z12.31 ENCOUNTER FOR SCREENING MAMMOGRAM FOR BREAST CANCER: ICD-10-CM

## 2024-02-12 PROCEDURE — 77063 BREAST TOMOSYNTHESIS BI: CPT

## 2024-03-27 ENCOUNTER — TELEPHONE (OUTPATIENT)
Dept: FAMILY MEDICINE CLINIC | Age: 84
End: 2024-03-27

## 2024-03-27 NOTE — TELEPHONE ENCOUNTER
Medical supply company calling about the order that they faxed over? For her CPAP wants to know if the forms are done and if so can we fax them over. Also told the medical supply company that the provider is out the office and will fax the papers over next week?

## 2024-04-05 DIAGNOSIS — I10 ESSENTIAL HYPERTENSION: ICD-10-CM

## 2024-04-05 RX ORDER — METOPROLOL SUCCINATE 50 MG/1
50 TABLET, EXTENDED RELEASE ORAL 2 TIMES DAILY
Qty: 180 TABLET | Refills: 3 | Status: SHIPPED | OUTPATIENT
Start: 2024-04-05

## 2024-04-05 NOTE — TELEPHONE ENCOUNTER
Requesting medication refill. Please approve or deny this request.    Rx requested:  Requested Prescriptions     Pending Prescriptions Disp Refills    metoprolol succinate (TOPROL XL) 50 MG extended release tablet [Pharmacy Med Name: METOPROLOL SUCCINATE ER TABS 50MG] 180 tablet 3     Sig: TAKE 1 TABLET TWICE A DAY         Last Office Visit:   4/20/2023      Next Visit Date:  Future Appointments   Date Time Provider Department Center   4/22/2024 12:30 PM Daniel Chadwick MD [facility]   5/28/2024 11:00 AM Bing Lester, APRN - CNP [facility]               Last refill 04/24/2023. Please approve or deny.

## 2024-04-22 ENCOUNTER — OFFICE VISIT (OUTPATIENT)
Dept: CARDIOLOGY CLINIC | Age: 84
End: 2024-04-22
Payer: MEDICARE

## 2024-04-22 VITALS
DIASTOLIC BLOOD PRESSURE: 82 MMHG | SYSTOLIC BLOOD PRESSURE: 124 MMHG | BODY MASS INDEX: 38.78 KG/M2 | OXYGEN SATURATION: 93 % | HEART RATE: 68 BPM | HEIGHT: 62 IN

## 2024-04-22 DIAGNOSIS — I10 ESSENTIAL HYPERTENSION: ICD-10-CM

## 2024-04-22 DIAGNOSIS — E78.5 DYSLIPIDEMIA: ICD-10-CM

## 2024-04-22 DIAGNOSIS — Z00.00 PE (PHYSICAL EXAM), ANNUAL: Primary | ICD-10-CM

## 2024-04-22 PROCEDURE — 3074F SYST BP LT 130 MM HG: CPT | Performed by: INTERNAL MEDICINE

## 2024-04-22 PROCEDURE — 99214 OFFICE O/P EST MOD 30 MIN: CPT | Performed by: INTERNAL MEDICINE

## 2024-04-22 PROCEDURE — 1123F ACP DISCUSS/DSCN MKR DOCD: CPT | Performed by: INTERNAL MEDICINE

## 2024-04-22 PROCEDURE — 3079F DIAST BP 80-89 MM HG: CPT | Performed by: INTERNAL MEDICINE

## 2024-04-22 PROCEDURE — 93000 ELECTROCARDIOGRAM COMPLETE: CPT | Performed by: INTERNAL MEDICINE

## 2024-04-22 ASSESSMENT — ENCOUNTER SYMPTOMS
EYES NEGATIVE: 1
COUGH: 0
WHEEZING: 0
BLOOD IN STOOL: 0
NAUSEA: 0
STRIDOR: 0
GASTROINTESTINAL NEGATIVE: 1
CHEST TIGHTNESS: 0

## 2024-04-22 NOTE — PROGRESS NOTES
thyromegaly present.   Cardiovascular: Normal rate, regular rhythm, normal heart sounds, intact distal pulses and normal pulses.   Pulmonary/Chest: Effort normal and breath sounds normal. She has no wheezes. She has no rales. She exhibits no tenderness.   Abdominal: Soft. Bowel sounds are normal. There is no abdominal tenderness.   Musculoskeletal:         General: No tenderness or edema. Normal range of motion.      Cervical back: Normal range of motion and neck supple.   Neurological: She is alert and oriented to person, place, and time.   Skin: Skin is warm. No cyanosis. Nails show no clubbing.       LABS:  CBC:   Lab Results   Component Value Date/Time    WBC 5.6 01/25/2024 10:29 AM    RBC 5.50 01/25/2024 10:29 AM    HGB 16.0 01/25/2024 10:29 AM    HCT 48.3 01/25/2024 10:29 AM    MCV 87.8 01/25/2024 10:29 AM    MCH 29.1 01/25/2024 10:29 AM    MCHC 33.1 01/25/2024 10:29 AM    RDW 14.2 01/25/2024 10:29 AM     01/25/2024 10:29 AM     Lipids:  Lab Results   Component Value Date    CHOL 177 06/07/2023    CHOL 168 03/18/2022    CHOL 161 03/02/2021     Lab Results   Component Value Date    TRIG 155 (H) 06/07/2023    TRIG 132 03/18/2022    TRIG 143 03/02/2021     Lab Results   Component Value Date    HDL 51 06/07/2023    HDL 53 03/18/2022    HDL 52 03/02/2021     Lab Results   Component Value Date    LDLCALC 95 06/07/2023    LDLCALC 89 03/18/2022    LDLCALC 80 03/02/2021     No results found for: \"VLDL\"  Lab Results   Component Value Date    CHOLHDLRATIO 2.9 03/14/2013    CHOLHDLRATIO 2.8 10/01/2012    CHOLHDLRATIO 4.2 07/02/2012     CMP:    Lab Results   Component Value Date/Time     06/07/2023 09:20 AM    K 4.9 06/07/2023 09:20 AM     06/07/2023 09:20 AM    CO2 25 06/07/2023 09:20 AM    BUN 16 06/07/2023 09:20 AM    CREATININE 0.77 06/07/2023 09:20 AM    GFRAA >60.0 03/18/2022 10:21 AM    LABGLOM >60.0 06/07/2023 09:20 AM    GLUCOSE 80 06/07/2023 09:20 AM    PROT 7.1 06/07/2023 09:20 AM    CALCIUM

## 2024-05-23 DIAGNOSIS — F41.1 GAD (GENERALIZED ANXIETY DISORDER): ICD-10-CM

## 2024-05-24 RX ORDER — BUSPIRONE HYDROCHLORIDE 5 MG/1
TABLET ORAL
Qty: 180 TABLET | Refills: 3 | Status: SHIPPED | OUTPATIENT
Start: 2024-05-24

## 2024-05-24 NOTE — TELEPHONE ENCOUNTER
Comments:     Last Office Visit (last PCP visit):   1/11/2024    Next Visit Date:  Future Appointments   Date Time Provider Department Center   5/28/2024 11:00 AM Bing Lester, LYNDA - CNP [facility]   4/25/2025 11:30 AM Daniel Chadwick MD [facility]       **If hasn't been seen in over a year OR hasn't followed up according to last diabetes/ADHD visit, make appointment for patient before sending refill to provider.    Rx requested:  Requested Prescriptions     Pending Prescriptions Disp Refills    busPIRone (BUSPAR) 5 MG tablet [Pharmacy Med Name: BUSPIRONE HCL TABS 5MG] 180 tablet 3     Sig: TAKE 1 TABLET TWICE A DAY AS NEEDED FOR ANXIETY

## 2024-05-28 ENCOUNTER — OFFICE VISIT (OUTPATIENT)
Dept: FAMILY MEDICINE CLINIC | Age: 84
End: 2024-05-28
Payer: MEDICARE

## 2024-05-28 VITALS
DIASTOLIC BLOOD PRESSURE: 84 MMHG | HEART RATE: 63 BPM | HEIGHT: 62 IN | BODY MASS INDEX: 39.34 KG/M2 | WEIGHT: 213.8 LBS | OXYGEN SATURATION: 94 % | SYSTOLIC BLOOD PRESSURE: 160 MMHG

## 2024-05-28 DIAGNOSIS — D45 POLYCYTHEMIA VERA (HCC): ICD-10-CM

## 2024-05-28 DIAGNOSIS — I27.20 PULMONARY HYPERTENSION (HCC): ICD-10-CM

## 2024-05-28 DIAGNOSIS — F41.1 GAD (GENERALIZED ANXIETY DISORDER): ICD-10-CM

## 2024-05-28 DIAGNOSIS — M15.9 GENERALIZED OSTEOARTHRITIS OF MULTIPLE SITES: ICD-10-CM

## 2024-05-28 DIAGNOSIS — E03.9 ACQUIRED HYPOTHYROIDISM: Primary | ICD-10-CM

## 2024-05-28 DIAGNOSIS — J44.9 CHRONIC OBSTRUCTIVE PULMONARY DISEASE, UNSPECIFIED COPD TYPE (HCC): ICD-10-CM

## 2024-05-28 DIAGNOSIS — E78.2 MIXED HYPERLIPIDEMIA: ICD-10-CM

## 2024-05-28 PROCEDURE — 3079F DIAST BP 80-89 MM HG: CPT | Performed by: NURSE PRACTITIONER

## 2024-05-28 PROCEDURE — 3077F SYST BP >= 140 MM HG: CPT | Performed by: NURSE PRACTITIONER

## 2024-05-28 PROCEDURE — 99214 OFFICE O/P EST MOD 30 MIN: CPT | Performed by: NURSE PRACTITIONER

## 2024-05-28 PROCEDURE — 1123F ACP DISCUSS/DSCN MKR DOCD: CPT | Performed by: NURSE PRACTITIONER

## 2024-05-28 SDOH — ECONOMIC STABILITY: FOOD INSECURITY: WITHIN THE PAST 12 MONTHS, THE FOOD YOU BOUGHT JUST DIDN'T LAST AND YOU DIDN'T HAVE MONEY TO GET MORE.: NEVER TRUE

## 2024-05-28 SDOH — ECONOMIC STABILITY: FOOD INSECURITY: WITHIN THE PAST 12 MONTHS, YOU WORRIED THAT YOUR FOOD WOULD RUN OUT BEFORE YOU GOT MONEY TO BUY MORE.: NEVER TRUE

## 2024-05-28 SDOH — ECONOMIC STABILITY: INCOME INSECURITY: HOW HARD IS IT FOR YOU TO PAY FOR THE VERY BASICS LIKE FOOD, HOUSING, MEDICAL CARE, AND HEATING?: NOT HARD AT ALL

## 2024-05-28 ASSESSMENT — ENCOUNTER SYMPTOMS
DIARRHEA: 0
CHEST TIGHTNESS: 0
ABDOMINAL PAIN: 0
BACK PAIN: 0
CONSTIPATION: 0
EYE PAIN: 0
COUGH: 0
COLOR CHANGE: 0
TROUBLE SWALLOWING: 0

## 2024-05-28 NOTE — PROGRESS NOTES
Subjective  Chief Complaint   Patient presents with    Hypertension     Check up    Hypothyroidism     Check up    Leg Pain     States that both legs have been aching more than normal and LT ankle.       Hypertension  This is a chronic problem. The current episode started more than 1 year ago. The problem is unchanged. The problem is controlled. Pertinent negatives include no malaise/fatigue or peripheral edema. Agents associated with hypertension include thyroid hormones. Risk factors for coronary artery disease include obesity, sedentary lifestyle, stress, post-menopausal state and dyslipidemia. Past treatments include beta blockers. The current treatment provides significant improvement. There are no compliance problems.  There is no history of CAD/MI, heart failure or PVD. Identifiable causes of hypertension include a thyroid problem. There is no history of chronic renal disease or a hypertension causing med.   Leg Pain       Bilateral leg pains, right knee, left ankle. Does take celebrex.     Palpitations-she is continuing to follow with Dr. Chadwick. Sees him annually now.    Anxiety-she is taking the buspar twice daily every day, feels she is doing okay with that.     COPD-doing okay, does get short of breath sometimes. She is no longer following with Dr. Garber per her choice.    Hypothyroidism-taking synthroid as prescribed.     Polycythemia-Now following with Dr. Strong, did not need phlebotomy at last visit. Has f/u every 6 months.    Past Medical History:   Diagnosis Date    Bladder prolapse, female, acquired     Chest pain 10/14/2017    Degenerative arthritis of cervical spine 06/29/2012    Diverticulosis     ALEJANDRE (dyspnea on exertion) 10/14/2017    ELIZABETH (generalized anxiety disorder)     Generalized osteoarthritis of multiple sites     knees and neck    Heart palpitations 11/13/2017    History of bone density study 09/2014    Hyperlipidemia 06/29/2012    Hypertension 06/29/2012    Hypothyroidism 06/29/2012

## 2024-06-18 DIAGNOSIS — I27.20 PULMONARY HYPERTENSION (HCC): ICD-10-CM

## 2024-06-18 DIAGNOSIS — E03.9 ACQUIRED HYPOTHYROIDISM: ICD-10-CM

## 2024-06-18 DIAGNOSIS — E78.2 MIXED HYPERLIPIDEMIA: ICD-10-CM

## 2024-06-18 LAB
ALBUMIN SERPL-MCNC: 3.8 G/DL (ref 3.5–4.6)
ALP SERPL-CCNC: 80 U/L (ref 40–130)
ALT SERPL-CCNC: 10 U/L (ref 0–33)
ANION GAP SERPL CALCULATED.3IONS-SCNC: 10 MEQ/L (ref 9–15)
AST SERPL-CCNC: 23 U/L (ref 0–35)
BILIRUB SERPL-MCNC: 0.8 MG/DL (ref 0.2–0.7)
BUN SERPL-MCNC: 17 MG/DL (ref 8–23)
CALCIUM SERPL-MCNC: 9.1 MG/DL (ref 8.5–9.9)
CHLORIDE SERPL-SCNC: 105 MEQ/L (ref 95–107)
CHOLEST SERPL-MCNC: 163 MG/DL (ref 0–199)
CO2 SERPL-SCNC: 25 MEQ/L (ref 20–31)
CREAT SERPL-MCNC: 0.79 MG/DL (ref 0.5–0.9)
GLOBULIN SER CALC-MCNC: 3.3 G/DL (ref 2.3–3.5)
GLUCOSE SERPL-MCNC: 86 MG/DL (ref 70–99)
HDLC SERPL-MCNC: 48 MG/DL (ref 40–59)
LDLC SERPL CALC-MCNC: 80 MG/DL (ref 0–129)
POTASSIUM SERPL-SCNC: 4.7 MEQ/L (ref 3.4–4.9)
PROT SERPL-MCNC: 7.1 G/DL (ref 6.3–8)
SODIUM SERPL-SCNC: 140 MEQ/L (ref 135–144)
TRIGL SERPL-MCNC: 176 MG/DL (ref 0–150)
TSH REFLEX: 2.93 UIU/ML (ref 0.44–3.86)

## 2024-07-23 NOTE — TELEPHONE ENCOUNTER
Pt called to make sure the medical supply company contacted [facility] about her CPAP parts that she needs ordered.    Caesar Collazo  Podiatric Medicine and Surgery  [address] [place] [address], [address]  [place] [address]  Phone: [phone]  Fax: [phone]  Follow Up Time:     Elmira Hardy  Podiatric Medicine  [facility]  [place] [address]  Phone: [phone]  Fax: [phone]  Follow Up Time:

## 2024-09-20 ENCOUNTER — TELEPHONE (OUTPATIENT)
Dept: FAMILY MEDICINE CLINIC | Age: 84
End: 2024-09-20

## 2024-10-01 ENCOUNTER — TELEPHONE (OUTPATIENT)
Dept: FAMILY MEDICINE CLINIC | Age: 84
End: 2024-10-01

## 2024-10-01 ENCOUNTER — OFFICE VISIT (OUTPATIENT)
Dept: FAMILY MEDICINE CLINIC | Age: 84
End: 2024-10-01
Payer: MEDICARE

## 2024-10-01 VITALS
OXYGEN SATURATION: 94 % | HEIGHT: 62 IN | BODY MASS INDEX: 38.98 KG/M2 | WEIGHT: 211.8 LBS | DIASTOLIC BLOOD PRESSURE: 68 MMHG | SYSTOLIC BLOOD PRESSURE: 132 MMHG | HEART RATE: 72 BPM

## 2024-10-01 DIAGNOSIS — F32.A DEPRESSION, UNSPECIFIED DEPRESSION TYPE: ICD-10-CM

## 2024-10-01 DIAGNOSIS — E03.9 ACQUIRED HYPOTHYROIDISM: Primary | ICD-10-CM

## 2024-10-01 DIAGNOSIS — R53.1 WEAKNESS GENERALIZED: ICD-10-CM

## 2024-10-01 DIAGNOSIS — H00.015 HORDEOLUM EXTERNUM OF LEFT LOWER EYELID: ICD-10-CM

## 2024-10-01 PROCEDURE — 3075F SYST BP GE 130 - 139MM HG: CPT | Performed by: NURSE PRACTITIONER

## 2024-10-01 PROCEDURE — 1123F ACP DISCUSS/DSCN MKR DOCD: CPT | Performed by: NURSE PRACTITIONER

## 2024-10-01 PROCEDURE — 99214 OFFICE O/P EST MOD 30 MIN: CPT | Performed by: NURSE PRACTITIONER

## 2024-10-01 PROCEDURE — 3078F DIAST BP <80 MM HG: CPT | Performed by: NURSE PRACTITIONER

## 2024-10-01 RX ORDER — SERTRALINE HYDROCHLORIDE 25 MG/1
25 TABLET, FILM COATED ORAL DAILY
Qty: 30 TABLET | Refills: 1 | Status: SHIPPED | OUTPATIENT
Start: 2024-10-01

## 2024-10-01 RX ORDER — NEOMYCIN SULFATE, POLYMYXIN B SULFATE, AND DEXAMETHASONE 3.5; 10000; 1 MG/G; [USP'U]/G; MG/G
OINTMENT OPHTHALMIC 3 TIMES DAILY
Qty: 1 EACH | Refills: 0 | Status: SHIPPED | OUTPATIENT
Start: 2024-10-01

## 2024-10-01 RX ORDER — LEVOTHYROXINE SODIUM 75 UG/1
TABLET ORAL
Qty: 52 TABLET | Refills: 3 | Status: SHIPPED | OUTPATIENT
Start: 2024-10-01

## 2024-10-01 RX ORDER — LEVOTHYROXINE SODIUM 50 UG/1
TABLET ORAL
Qty: 39 TABLET | Refills: 3 | Status: SHIPPED | OUTPATIENT
Start: 2024-10-01

## 2024-10-01 ASSESSMENT — ENCOUNTER SYMPTOMS
CONSTIPATION: 0
COUGH: 0
DIARRHEA: 0
TROUBLE SWALLOWING: 0
BACK PAIN: 0
ABDOMINAL PAIN: 0
COLOR CHANGE: 0
EYE PAIN: 0
CHEST TIGHTNESS: 0

## 2024-10-01 NOTE — PROGRESS NOTES
Subjective  Chief Complaint   Patient presents with    Anxiety    Hypertension     Pt states not doing well with anxiety and not well in general.        HPI    Pt here for check up.    Not feeling well today, feels weak. No fatigue. Has eaten today. Daughter notices these symptoms started when the started talking about coming to appt today. Both agree this is likely anxiety related.    Has stye in left lower lid, been there for 3 days. Applying warm compresses.    Palpitations-she is continuing to follow with Dr. Chadwick. Sees him annually now.    Anxiety-she is taking the buspar twice daily every day, has had increasing anxiety and depression. Lacking motivation, tearful.    COPD-doing okay, does get short of breath sometimes. She is no longer following with Dr. Garber per her choice.    Hypothyroidism-taking synthroid as prescribed.     Polycythemia-Now following with Dr. Strong, did not need phlebotomy at last visit. Has f/u every 6 months.    Past Medical History:   Diagnosis Date    Bladder prolapse, female, acquired     Chest pain 10/14/2017    Degenerative arthritis of cervical spine 06/29/2012    Diverticulosis     ALEJANDRE (dyspnea on exertion) 10/14/2017    ELIZABETH (generalized anxiety disorder)     Generalized osteoarthritis of multiple sites     knees and neck    Heart palpitations 11/13/2017    History of bone density study 09/2014    Hyperlipidemia 06/29/2012    Hypertension 06/29/2012    Hypothyroidism 06/29/2012    Internal hemorrhoid     Osteopenia 09/2014    Seborrheic dermatitis of scalp      Patient Active Problem List    Diagnosis Date Noted    Severe obesity (BMI 35.0-39.9) with comorbidity 05/18/2023    Pulmonary hypertension (HCC) 02/15/2022    Chronic obstructive pulmonary disease, unspecified COPD type (HCC) 08/03/2021    Morbidly obese 09/03/2020    Sleep apnea 09/03/2019    Obesity (BMI 30-39.9) 09/03/2019    Polycythemia 09/03/2019    Heart palpitations 05/20/2019    Mixed hyperlipidemia 05/20/2019

## 2024-10-01 NOTE — TELEPHONE ENCOUNTER
Daughter stopped in to  prescriptions at the pharmacy and the pharmacy said that they were waiting on something they sent us.

## 2024-10-01 NOTE — TELEPHONE ENCOUNTER
[facility] calling about neomycin-polymyxin-dexameth [phone] oint the would like to know how many drops for that

## 2024-10-02 NOTE — TELEPHONE ENCOUNTER
Pt is calling back to see if the pharmacy has been contacted yet about this prescription so that pt can go pick it up.

## 2024-10-30 ENCOUNTER — OFFICE VISIT (OUTPATIENT)
Dept: FAMILY MEDICINE CLINIC | Age: 84
End: 2024-10-30

## 2024-10-30 VITALS
SYSTOLIC BLOOD PRESSURE: 132 MMHG | DIASTOLIC BLOOD PRESSURE: 72 MMHG | HEART RATE: 65 BPM | OXYGEN SATURATION: 95 % | WEIGHT: 209 LBS | BODY MASS INDEX: 38.46 KG/M2 | HEIGHT: 62 IN

## 2024-10-30 DIAGNOSIS — F32.A DEPRESSION, UNSPECIFIED DEPRESSION TYPE: ICD-10-CM

## 2024-10-30 DIAGNOSIS — F41.1 GAD (GENERALIZED ANXIETY DISORDER): ICD-10-CM

## 2024-10-30 RX ORDER — SERTRALINE HYDROCHLORIDE 25 MG/1
25 TABLET, FILM COATED ORAL DAILY
Qty: 30 TABLET | Refills: 1 | Status: SHIPPED | OUTPATIENT
Start: 2024-10-30

## 2024-10-30 RX ORDER — BUSPIRONE HYDROCHLORIDE 5 MG/1
5 TABLET ORAL 3 TIMES DAILY
Qty: 270 TABLET | Refills: 1 | Status: SHIPPED | OUTPATIENT
Start: 2024-10-30

## 2024-10-30 ASSESSMENT — ENCOUNTER SYMPTOMS
CONSTIPATION: 0
DIARRHEA: 0
SHORTNESS OF BREATH: 0
COUGH: 0
BACK PAIN: 0
COLOR CHANGE: 0
TROUBLE SWALLOWING: 0
ABDOMINAL PAIN: 0
EYE PAIN: 0
CHEST TIGHTNESS: 0

## 2024-10-30 NOTE — PROGRESS NOTES
Subjective  Chief Complaint   Patient presents with    Depression     3 week check up after starting zoloft. Feels that she is doing a little better. States that she has been clenching her jaw though and has never noticed that before.       HPI    3 week f/u on depression.    Started on zoloft at last visit 3 weeks ago. Pt has not noticed a significant improvement, but does feel she has been less tearful overall. Has had some jaw clenching since starting but not severe. Does not even notice at times. Takes buspar bid, notices that she does get some irritability in the afternoon.         Past Medical History:   Diagnosis Date    Bladder prolapse, female, acquired     Chest pain 10/14/2017    Degenerative arthritis of cervical spine 06/29/2012    Diverticulosis     ALEJANDRE (dyspnea on exertion) 10/14/2017    ELIZABETH (generalized anxiety disorder)     Generalized osteoarthritis of multiple sites     knees and neck    Heart palpitations 11/13/2017    History of bone density study 09/2014    Hyperlipidemia 06/29/2012    Hypertension 06/29/2012    Hypothyroidism 06/29/2012    Internal hemorrhoid     Osteopenia 09/2014    Seborrheic dermatitis of scalp      Patient Active Problem List    Diagnosis Date Noted    Severe obesity (BMI 35.0-39.9) with comorbidity 05/18/2023    Pulmonary hypertension (HCC) 02/15/2022    Chronic obstructive pulmonary disease, unspecified COPD type (HCC) 08/03/2021    Morbidly obese 09/03/2020    Sleep apnea 09/03/2019    Obesity (BMI 30-39.9) 09/03/2019    Polycythemia 09/03/2019    Heart palpitations 05/20/2019    Mixed hyperlipidemia 05/20/2019    ALEJANDRE (dyspnea on exertion) 05/20/2019    Elevated hemoglobin (HCC) 05/10/2019    Generalized osteoarthritis of multiple sites     ELIZABETH (generalized anxiety disorder)     Seborrheic dermatitis of scalp     Osteoarthritis of knees, bilateral     Hypertension 06/29/2012    Hyperlipidemia 06/29/2012    Hypothyroidism 06/29/2012    Degenerative arthritis of cervical

## 2024-11-20 ENCOUNTER — OFFICE VISIT (OUTPATIENT)
Dept: FAMILY MEDICINE CLINIC | Age: 84
End: 2024-11-20
Payer: MEDICARE

## 2024-11-20 VITALS
SYSTOLIC BLOOD PRESSURE: 118 MMHG | HEART RATE: 87 BPM | BODY MASS INDEX: 37.36 KG/M2 | WEIGHT: 203 LBS | HEIGHT: 62 IN | OXYGEN SATURATION: 95 % | DIASTOLIC BLOOD PRESSURE: 84 MMHG

## 2024-11-20 DIAGNOSIS — D45 POLYCYTHEMIA VERA (HCC): ICD-10-CM

## 2024-11-20 DIAGNOSIS — E03.9 ACQUIRED HYPOTHYROIDISM: ICD-10-CM

## 2024-11-20 DIAGNOSIS — R19.7 DIARRHEA, UNSPECIFIED TYPE: ICD-10-CM

## 2024-11-20 DIAGNOSIS — Z00.00 MEDICARE ANNUAL WELLNESS VISIT, SUBSEQUENT: Primary | ICD-10-CM

## 2024-11-20 DIAGNOSIS — F41.1 GAD (GENERALIZED ANXIETY DISORDER): ICD-10-CM

## 2024-11-20 LAB
ALBUMIN SERPL-MCNC: 3.7 G/DL (ref 3.5–4.6)
ALP SERPL-CCNC: 80 U/L (ref 40–130)
ALT SERPL-CCNC: 7 U/L (ref 0–33)
ANION GAP SERPL CALCULATED.3IONS-SCNC: 11 MEQ/L (ref 9–15)
AST SERPL-CCNC: 14 U/L (ref 0–35)
BILIRUB SERPL-MCNC: 0.9 MG/DL (ref 0.2–0.7)
BUN SERPL-MCNC: 11 MG/DL (ref 8–23)
CALCIUM SERPL-MCNC: 9.3 MG/DL (ref 8.5–9.9)
CHLORIDE SERPL-SCNC: 103 MEQ/L (ref 95–107)
CO2 SERPL-SCNC: 27 MEQ/L (ref 20–31)
CREAT SERPL-MCNC: 0.87 MG/DL (ref 0.5–0.9)
ERYTHROCYTE [DISTWIDTH] IN BLOOD BY AUTOMATED COUNT: 14.6 % (ref 11.5–14.5)
GLOBULIN SER CALC-MCNC: 3.3 G/DL (ref 2.3–3.5)
GLUCOSE SERPL-MCNC: 96 MG/DL (ref 70–99)
HCT VFR BLD AUTO: 47.9 % (ref 37–47)
HGB BLD-MCNC: 16.6 G/DL (ref 12–16)
MCH RBC QN AUTO: 30.5 PG (ref 27–31.3)
MCHC RBC AUTO-ENTMCNC: 34.7 % (ref 33–37)
MCV RBC AUTO: 88.1 FL (ref 79.4–94.8)
PLATELET # BLD AUTO: 251 K/UL (ref 130–400)
POTASSIUM SERPL-SCNC: 3.8 MEQ/L (ref 3.4–4.9)
PROT SERPL-MCNC: 7 G/DL (ref 6.3–8)
RBC # BLD AUTO: 5.44 M/UL (ref 4.2–5.4)
SODIUM SERPL-SCNC: 141 MEQ/L (ref 135–144)
TSH REFLEX: 3.38 UIU/ML (ref 0.44–3.86)
WBC # BLD AUTO: 6.9 K/UL (ref 4.8–10.8)

## 2024-11-20 PROCEDURE — 1159F MED LIST DOCD IN RCRD: CPT | Performed by: NURSE PRACTITIONER

## 2024-11-20 PROCEDURE — 1123F ACP DISCUSS/DSCN MKR DOCD: CPT | Performed by: NURSE PRACTITIONER

## 2024-11-20 PROCEDURE — 3079F DIAST BP 80-89 MM HG: CPT | Performed by: NURSE PRACTITIONER

## 2024-11-20 PROCEDURE — G0439 PPPS, SUBSEQ VISIT: HCPCS | Performed by: NURSE PRACTITIONER

## 2024-11-20 PROCEDURE — 3074F SYST BP LT 130 MM HG: CPT | Performed by: NURSE PRACTITIONER

## 2024-11-20 ASSESSMENT — PATIENT HEALTH QUESTIONNAIRE - PHQ9
10. IF YOU CHECKED OFF ANY PROBLEMS, HOW DIFFICULT HAVE THESE PROBLEMS MADE IT FOR YOU TO DO YOUR WORK, TAKE CARE OF THINGS AT HOME, OR GET ALONG WITH OTHER PEOPLE: NOT DIFFICULT AT ALL
SUM OF ALL RESPONSES TO PHQ QUESTIONS 1-9: 3
9. THOUGHTS THAT YOU WOULD BE BETTER OFF DEAD, OR OF HURTING YOURSELF: NOT AT ALL
SUM OF ALL RESPONSES TO PHQ QUESTIONS 1-9: 3
8. MOVING OR SPEAKING SO SLOWLY THAT OTHER PEOPLE COULD HAVE NOTICED. OR THE OPPOSITE, BEING SO FIGETY OR RESTLESS THAT YOU HAVE BEEN MOVING AROUND A LOT MORE THAN USUAL: NOT AT ALL
6. FEELING BAD ABOUT YOURSELF - OR THAT YOU ARE A FAILURE OR HAVE LET YOURSELF OR YOUR FAMILY DOWN: NOT AT ALL
SUM OF ALL RESPONSES TO PHQ QUESTIONS 1-9: 3
4. FEELING TIRED OR HAVING LITTLE ENERGY: MORE THAN HALF THE DAYS
SUM OF ALL RESPONSES TO PHQ9 QUESTIONS 1 & 2: 0
1. LITTLE INTEREST OR PLEASURE IN DOING THINGS: NOT AT ALL
2. FEELING DOWN, DEPRESSED OR HOPELESS: NOT AT ALL
7. TROUBLE CONCENTRATING ON THINGS, SUCH AS READING THE NEWSPAPER OR WATCHING TELEVISION: NOT AT ALL
SUM OF ALL RESPONSES TO PHQ QUESTIONS 1-9: 3
3. TROUBLE FALLING OR STAYING ASLEEP: NOT AT ALL
5. POOR APPETITE OR OVEREATING: SEVERAL DAYS

## 2024-11-20 ASSESSMENT — LIFESTYLE VARIABLES
HOW OFTEN DO YOU HAVE A DRINK CONTAINING ALCOHOL: NEVER
HOW MANY STANDARD DRINKS CONTAINING ALCOHOL DO YOU HAVE ON A TYPICAL DAY: PATIENT DOES NOT DRINK

## 2024-11-20 NOTE — PROGRESS NOTES
CareTeam (Including outside providers/suppliers regularly involved in providing care):   Patient Care Team:  Bing Lester APRN - CNP as PCP - General (Family Medicine)  Bing Lester APRN - CNP as PCP - Empaneled Provider  Daniel Chadwick MD as Cardiologist (Cardiology)      Reviewed and updated this visit:  Tobacco  Allergies  Meds  Med Hx  Surg Hx  Soc Hx  Fam Hx

## 2024-11-20 NOTE — PATIENT INSTRUCTIONS
motion in joints and muscles.  Includes upper arm stretches, calf stretches, and gentle yoga.  Aim for at least twice a week, preferably after your muscles are warmed up from other activities.  It can help you function better in daily life.  Balancing.  This helps you stay coordinated and have good posture.  Includes heel-to-toe walking, blanco chi, and certain types of yoga.  Aim for at least 3 days a week.  It can reduce your risk of falling.  Even if you have a hard time meeting the recommendations, it's better to be more active than less active. All activity done in each category counts toward your weekly total. You'd be surprised how daily things like carrying groceries, keeping up with grandchildren, and taking the stairs can add up.  What keeps you from being active?  If you've had a hard time being more active, you're not alone. Maybe you remember being able to do more. Or maybe you've never thought of yourself as being active. It's frustrating when you can't do the things you want. Being more active can help. What's holding you back?  Getting started.  Have a goal, but break it into easy tasks. Small steps build into big accomplishments.  Staying motivated.  If you feel like skipping your activity, remember your goal. Maybe you want to move better and stay independent. Every activity gets you one step closer.  Not feeling your best.  Start with 5 minutes of an activity you enjoy. Prove to yourself you can do it. As you get comfortable, increase your time.  You may not be where you want to be. But you're in the process of getting there. Everyone starts somewhere.  How can you find safe ways to stay active?  Talk with your doctor about any physical challenges you're facing. Make a plan with your doctor if you have a health problem or aren't sure how to get started with activity.  If you're already active, ask your doctor if there is anything you should change to stay safe as your body and health change.  If you

## 2024-12-13 ENCOUNTER — OFFICE VISIT (OUTPATIENT)
Dept: GASTROENTEROLOGY | Age: 84
End: 2024-12-13
Payer: MEDICARE

## 2024-12-13 VITALS
WEIGHT: 203 LBS | BODY MASS INDEX: 37.36 KG/M2 | DIASTOLIC BLOOD PRESSURE: 78 MMHG | HEART RATE: 92 BPM | OXYGEN SATURATION: 96 % | SYSTOLIC BLOOD PRESSURE: 131 MMHG | HEIGHT: 62 IN

## 2024-12-13 DIAGNOSIS — R19.7 DIARRHEA, UNSPECIFIED TYPE: Primary | ICD-10-CM

## 2024-12-13 DIAGNOSIS — R19.7 DIARRHEA, UNSPECIFIED TYPE: ICD-10-CM

## 2024-12-13 PROCEDURE — 3078F DIAST BP <80 MM HG: CPT | Performed by: SPECIALIST

## 2024-12-13 PROCEDURE — 3075F SYST BP GE 130 - 139MM HG: CPT | Performed by: SPECIALIST

## 2024-12-13 PROCEDURE — 99203 OFFICE O/P NEW LOW 30 MIN: CPT | Performed by: SPECIALIST

## 2024-12-13 PROCEDURE — 1159F MED LIST DOCD IN RCRD: CPT | Performed by: SPECIALIST

## 2024-12-13 PROCEDURE — 1123F ACP DISCUSS/DSCN MKR DOCD: CPT | Performed by: SPECIALIST

## 2024-12-13 ASSESSMENT — ENCOUNTER SYMPTOMS
RESPIRATORY NEGATIVE: 1
NAUSEA: 0
ANAL BLEEDING: 0
VOMITING: 0
GASTROINTESTINAL NEGATIVE: 1
ABDOMINAL PAIN: 0
BLOOD IN STOOL: 0
RECTAL PAIN: 0
DIARRHEA: 0
ABDOMINAL DISTENTION: 0
EYES NEGATIVE: 1
CONSTIPATION: 0

## 2024-12-13 NOTE — PROGRESS NOTES
Exam  Constitutional:       Appearance: She is well-developed.   HENT:      Head: Normocephalic and atraumatic.   Eyes:      Conjunctiva/sclera: Conjunctivae normal.      Pupils: Pupils are equal, round, and reactive to light.   Cardiovascular:      Rate and Rhythm: Normal rate.   Pulmonary:      Effort: Pulmonary effort is normal.   Abdominal:      General: Bowel sounds are normal.      Palpations: Abdomen is soft.      Comments: Obese, soft nontender no palpable mass.   Musculoskeletal:         General: Normal range of motion.      Cervical back: Normal range of motion.   Skin:     General: Skin is warm.   Neurological:      Mental Status: She is alert.         Laboratory, Pathology, Radiology reviewed indetail with relevant important investigations summarized below:  Lab Results   Component Value Date    WBC 6.9 11/20/2024    HGB 16.6 (H) 11/20/2024    HCT 47.9 (H) 11/20/2024    MCV 88.1 11/20/2024     11/20/2024     Lab Results   Component Value Date    ALT 7 11/20/2024    AST 14 11/20/2024    ALKPHOS 80 11/20/2024    BILITOT 0.9 (H) 11/20/2024       No results found.    Endoscopic investigations:     Assessmentand Plan:  84 y.o. female with history of altered bowel habits with watery and mushy stools since October , symptoms has significantly improved lately and has almost normal BM, had lactose intolerance in her early life which had resolved, son has a diagnosis of celiac sprue.  Recent CBC and CMP was unremarkable , most likely viral gastroenteritis which seem to have resolved.  Currently patient is almost asymptomatic.  Since patient has a family history of celiac sprue we will obtain celiac serology   Diagnosis Orders   1. Diarrhea, unspecified type  Celiac Panel Reflex to T[facility]        Return if symptoms worsen or fail to improve.    Anton Jung MD   Staff Gastroenterologist  [facility]    Please note this report has been partially produced using speech recognition software

## 2024-12-14 DIAGNOSIS — E78.2 MIXED HYPERLIPIDEMIA: ICD-10-CM

## 2024-12-14 DIAGNOSIS — M47.812 OSTEOARTHRITIS OF CERVICAL SPINE, UNSPECIFIED SPINAL OSTEOARTHRITIS COMPLICATION STATUS: ICD-10-CM

## 2024-12-15 LAB — TTG IGA SER IA-ACNC: 1.06 FLU (ref 0–4.99)

## 2024-12-16 RX ORDER — CELECOXIB 200 MG/1
CAPSULE ORAL
Qty: 90 CAPSULE | Refills: 3 | Status: SHIPPED | OUTPATIENT
Start: 2024-12-16

## 2024-12-16 RX ORDER — PRAVASTATIN SODIUM 40 MG
40 TABLET ORAL NIGHTLY
Qty: 90 TABLET | Refills: 3 | Status: SHIPPED | OUTPATIENT
Start: 2024-12-16

## 2024-12-16 NOTE — TELEPHONE ENCOUNTER
Comments:     Last Office Visit (last PCP visit):   11/20/2024    Next Visit Date:  Future Appointments   Date Time Provider Department Center   2/20/2025 11:15 AM Bing Lester, APRN - CNP [facility]   4/25/2025 11:30 AM Daniel Chadwick MD [facility]       **If hasn't been seen in over a year OR hasn't followed up according to last diabetes/ADHD visit, make appointment for patient before sending refill to provider.    Rx requested:  Requested Prescriptions     Pending Prescriptions Disp Refills    pravastatin (PRAVACHOL) 40 MG tablet [Pharmacy Med Name: PRAVASTATIN TABS 40MG] 90 tablet 3     Sig: TAKE 1 TABLET NIGHTLY    celecoxib (CELEBREX) 200 MG capsule [Pharmacy Med Name: CELECOXIB CAPS 200MG] 90 capsule 3     Sig: TAKE 1 CAPSULE DAILY

## 2025-01-10 ENCOUNTER — HOSPITAL ENCOUNTER (INPATIENT)
Age: 85
LOS: 4 days | Discharge: HOME | DRG: 175 | End: 2025-01-14
Attending: INTERNAL MEDICINE | Admitting: FAMILY MEDICINE
Payer: MEDICARE

## 2025-01-10 ENCOUNTER — APPOINTMENT (OUTPATIENT)
Dept: CT IMAGING | Age: 85
DRG: 175 | End: 2025-01-10
Payer: MEDICARE

## 2025-01-10 ENCOUNTER — APPOINTMENT (OUTPATIENT)
Dept: GENERAL RADIOLOGY | Age: 85
DRG: 175 | End: 2025-01-10
Payer: MEDICARE

## 2025-01-10 ENCOUNTER — OFFICE VISIT (OUTPATIENT)
Dept: FAMILY MEDICINE CLINIC | Age: 85
End: 2025-01-10
Payer: MEDICARE

## 2025-01-10 VITALS
WEIGHT: 200 LBS | HEART RATE: 95 BPM | BODY MASS INDEX: 36.8 KG/M2 | OXYGEN SATURATION: 96 % | SYSTOLIC BLOOD PRESSURE: 124 MMHG | DIASTOLIC BLOOD PRESSURE: 78 MMHG | HEIGHT: 62 IN | TEMPERATURE: 97.9 F

## 2025-01-10 DIAGNOSIS — I26.02 ACUTE SADDLE PULMONARY EMBOLISM WITH ACUTE COR PULMONALE (HCC): ICD-10-CM

## 2025-01-10 DIAGNOSIS — R07.1 CHEST PAIN ON BREATHING: ICD-10-CM

## 2025-01-10 DIAGNOSIS — I26.92 ACUTE SADDLE PULMONARY EMBOLISM WITHOUT ACUTE COR PULMONALE (HCC): Primary | ICD-10-CM

## 2025-01-10 DIAGNOSIS — I50.31 ACUTE HEART FAILURE WITH PRESERVED EJECTION FRACTION (HFPEF) (HCC): ICD-10-CM

## 2025-01-10 DIAGNOSIS — I48.91 ATRIAL FIBRILLATION, UNSPECIFIED TYPE (HCC): ICD-10-CM

## 2025-01-10 DIAGNOSIS — M54.6 ACUTE RIGHT-SIDED THORACIC BACK PAIN: ICD-10-CM

## 2025-01-10 DIAGNOSIS — R06.02 SHORTNESS OF BREATH: Primary | ICD-10-CM

## 2025-01-10 LAB
ALBUMIN SERPL-MCNC: 3.6 G/DL (ref 3.5–4.6)
ALP SERPL-CCNC: 164 U/L (ref 40–130)
ALT SERPL-CCNC: 16 U/L (ref 0–33)
ANION GAP SERPL CALCULATED.3IONS-SCNC: 10 MEQ/L (ref 9–15)
ANTI-XA UNFRAC HEPARIN: 1.17 IU/ML
ANTI-XA UNFRAC HEPARIN: <0.1 IU/ML
APTT PPP: 26.5 SEC (ref 24.4–36.8)
AST SERPL-CCNC: 25 U/L (ref 0–35)
BASOPHILS # BLD: 0 K/UL (ref 0–0.2)
BASOPHILS NFR BLD: 0.5 %
BILIRUB SERPL-MCNC: 0.8 MG/DL (ref 0.2–0.7)
BNP BLD-MCNC: 1150 PG/ML
BUN SERPL-MCNC: 13 MG/DL (ref 8–23)
CALCIUM SERPL-MCNC: 9.7 MG/DL (ref 8.5–9.9)
CHLORIDE SERPL-SCNC: 103 MEQ/L (ref 95–107)
CO2 SERPL-SCNC: 26 MEQ/L (ref 20–31)
CREAT SERPL-MCNC: 0.7 MG/DL (ref 0.5–0.9)
D DIMER PPP FEU-MCNC: 5.73 MG/L FEU (ref 0–0.5)
EOSINOPHIL # BLD: 0.1 K/UL (ref 0–0.7)
EOSINOPHIL NFR BLD: 0.6 %
ERYTHROCYTE [DISTWIDTH] IN BLOOD BY AUTOMATED COUNT: 13.5 % (ref 11.5–14.5)
GLOBULIN SER CALC-MCNC: 3.6 G/DL (ref 2.3–3.5)
GLUCOSE SERPL-MCNC: 124 MG/DL (ref 70–99)
HCT VFR BLD AUTO: 44.3 % (ref 37–47)
HGB BLD-MCNC: 15.1 G/DL (ref 12–16)
INFLUENZA A BY PCR: NEGATIVE
INFLUENZA B BY PCR: NEGATIVE
INR PPP: 1.1
LYMPHOCYTES # BLD: 1.6 K/UL (ref 1–4.8)
LYMPHOCYTES NFR BLD: 20.9 %
MAGNESIUM SERPL-MCNC: 1.7 MG/DL (ref 1.7–2.4)
MCH RBC QN AUTO: 30.6 PG (ref 27–31.3)
MCHC RBC AUTO-ENTMCNC: 34.1 % (ref 33–37)
MCV RBC AUTO: 89.7 FL (ref 79.4–94.8)
MONOCYTES # BLD: 1.2 K/UL (ref 0.2–0.8)
MONOCYTES NFR BLD: 15.5 %
NEUTROPHILS # BLD: 4.9 K/UL (ref 1.4–6.5)
NEUTS SEG NFR BLD: 62.2 %
PLATELET # BLD AUTO: 233 K/UL (ref 130–400)
POTASSIUM SERPL-SCNC: 3.7 MEQ/L (ref 3.4–4.9)
PROT SERPL-MCNC: 7.2 G/DL (ref 6.3–8)
PROTHROMBIN TIME: 13.9 SEC (ref 12.3–14.9)
RBC # BLD AUTO: 4.94 M/UL (ref 4.2–5.4)
SARS-COV-2 RDRP RESP QL NAA+PROBE: NOT DETECTED
SODIUM SERPL-SCNC: 139 MEQ/L (ref 135–144)
TROPONIN, HIGH SENSITIVITY: 10 NG/L (ref 0–19)
TROPONIN, HIGH SENSITIVITY: 10 NG/L (ref 0–19)
WBC # BLD AUTO: 7.8 K/UL (ref 4.8–10.8)

## 2025-01-10 PROCEDURE — 1123F ACP DISCUSS/DSCN MKR DOCD: CPT | Performed by: PHYSICIAN ASSISTANT

## 2025-01-10 PROCEDURE — 2500000003 HC RX 250 WO HCPCS: Performed by: REGISTERED NURSE

## 2025-01-10 PROCEDURE — 85610 PROTHROMBIN TIME: CPT

## 2025-01-10 PROCEDURE — 84484 ASSAY OF TROPONIN QUANT: CPT

## 2025-01-10 PROCEDURE — 6370000000 HC RX 637 (ALT 250 FOR IP): Performed by: REGISTERED NURSE

## 2025-01-10 PROCEDURE — 6360000004 HC RX CONTRAST MEDICATION: Performed by: INTERNAL MEDICINE

## 2025-01-10 PROCEDURE — 3078F DIAST BP <80 MM HG: CPT | Performed by: PHYSICIAN ASSISTANT

## 2025-01-10 PROCEDURE — 99213 OFFICE O/P EST LOW 20 MIN: CPT | Performed by: PHYSICIAN ASSISTANT

## 2025-01-10 PROCEDURE — 1160F RVW MEDS BY RX/DR IN RCRD: CPT | Performed by: PHYSICIAN ASSISTANT

## 2025-01-10 PROCEDURE — 3074F SYST BP LT 130 MM HG: CPT | Performed by: PHYSICIAN ASSISTANT

## 2025-01-10 PROCEDURE — 71046 X-RAY EXAM CHEST 2 VIEWS: CPT

## 2025-01-10 PROCEDURE — 83880 ASSAY OF NATRIURETIC PEPTIDE: CPT

## 2025-01-10 PROCEDURE — 87635 SARS-COV-2 COVID-19 AMP PRB: CPT

## 2025-01-10 PROCEDURE — 87502 INFLUENZA DNA AMP PROBE: CPT

## 2025-01-10 PROCEDURE — 2060000000 HC ICU INTERMEDIATE R&B

## 2025-01-10 PROCEDURE — 85379 FIBRIN DEGRADATION QUANT: CPT

## 2025-01-10 PROCEDURE — 80053 COMPREHEN METABOLIC PANEL: CPT

## 2025-01-10 PROCEDURE — 85025 COMPLETE CBC W/AUTO DIFF WBC: CPT

## 2025-01-10 PROCEDURE — 1125F AMNT PAIN NOTED PAIN PRSNT: CPT | Performed by: PHYSICIAN ASSISTANT

## 2025-01-10 PROCEDURE — 71275 CT ANGIOGRAPHY CHEST: CPT

## 2025-01-10 PROCEDURE — 1159F MED LIST DOCD IN RCRD: CPT | Performed by: PHYSICIAN ASSISTANT

## 2025-01-10 PROCEDURE — 93005 ELECTROCARDIOGRAM TRACING: CPT | Performed by: INTERNAL MEDICINE

## 2025-01-10 PROCEDURE — 85730 THROMBOPLASTIN TIME PARTIAL: CPT

## 2025-01-10 PROCEDURE — 99285 EMERGENCY DEPT VISIT HI MDM: CPT

## 2025-01-10 PROCEDURE — 36415 COLL VENOUS BLD VENIPUNCTURE: CPT

## 2025-01-10 PROCEDURE — 6360000002 HC RX W HCPCS: Performed by: INTERNAL MEDICINE

## 2025-01-10 PROCEDURE — 96365 THER/PROPH/DIAG IV INF INIT: CPT

## 2025-01-10 PROCEDURE — 83735 ASSAY OF MAGNESIUM: CPT

## 2025-01-10 PROCEDURE — 85520 HEPARIN ASSAY: CPT

## 2025-01-10 RX ORDER — ONDANSETRON 2 MG/ML
4 INJECTION INTRAMUSCULAR; INTRAVENOUS EVERY 6 HOURS PRN
Status: DISCONTINUED | OUTPATIENT
Start: 2025-01-10 | End: 2025-01-14 | Stop reason: HOSPADM

## 2025-01-10 RX ORDER — HEPARIN SODIUM 1000 [USP'U]/ML
80 INJECTION, SOLUTION INTRAVENOUS; SUBCUTANEOUS ONCE
Status: COMPLETED | OUTPATIENT
Start: 2025-01-10 | End: 2025-01-10

## 2025-01-10 RX ORDER — SODIUM CHLORIDE 0.9 % (FLUSH) 0.9 %
5-40 SYRINGE (ML) INJECTION PRN
Status: DISCONTINUED | OUTPATIENT
Start: 2025-01-10 | End: 2025-01-14 | Stop reason: HOSPADM

## 2025-01-10 RX ORDER — POLYETHYLENE GLYCOL 3350 17 G/17G
17 POWDER, FOR SOLUTION ORAL DAILY PRN
Status: DISCONTINUED | OUTPATIENT
Start: 2025-01-10 | End: 2025-01-14 | Stop reason: HOSPADM

## 2025-01-10 RX ORDER — ACETAMINOPHEN 650 MG/1
650 SUPPOSITORY RECTAL EVERY 6 HOURS PRN
Status: DISCONTINUED | OUTPATIENT
Start: 2025-01-10 | End: 2025-01-14 | Stop reason: HOSPADM

## 2025-01-10 RX ORDER — ACETAMINOPHEN 325 MG/1
650 TABLET ORAL EVERY 6 HOURS PRN
Status: DISCONTINUED | OUTPATIENT
Start: 2025-01-10 | End: 2025-01-14 | Stop reason: HOSPADM

## 2025-01-10 RX ORDER — SODIUM CHLORIDE 9 MG/ML
INJECTION, SOLUTION INTRAVENOUS PRN
Status: DISCONTINUED | OUTPATIENT
Start: 2025-01-10 | End: 2025-01-14 | Stop reason: HOSPADM

## 2025-01-10 RX ORDER — HEPARIN SODIUM 1000 [USP'U]/ML
80 INJECTION, SOLUTION INTRAVENOUS; SUBCUTANEOUS PRN
Status: DISCONTINUED | OUTPATIENT
Start: 2025-01-10 | End: 2025-01-12

## 2025-01-10 RX ORDER — SODIUM CHLORIDE 0.9 % (FLUSH) 0.9 %
5-40 SYRINGE (ML) INJECTION EVERY 12 HOURS SCHEDULED
Status: DISCONTINUED | OUTPATIENT
Start: 2025-01-10 | End: 2025-01-14 | Stop reason: HOSPADM

## 2025-01-10 RX ORDER — HEPARIN SODIUM 10000 [USP'U]/100ML
5-30 INJECTION, SOLUTION INTRAVENOUS CONTINUOUS
Status: DISCONTINUED | OUTPATIENT
Start: 2025-01-10 | End: 2025-01-12

## 2025-01-10 RX ORDER — ONDANSETRON 4 MG/1
4 TABLET, ORALLY DISINTEGRATING ORAL EVERY 8 HOURS PRN
Status: DISCONTINUED | OUTPATIENT
Start: 2025-01-10 | End: 2025-01-14 | Stop reason: HOSPADM

## 2025-01-10 RX ORDER — IOPAMIDOL 755 MG/ML
75 INJECTION, SOLUTION INTRAVASCULAR
Status: COMPLETED | OUTPATIENT
Start: 2025-01-10 | End: 2025-01-10

## 2025-01-10 RX ORDER — HEPARIN SODIUM 1000 [USP'U]/ML
40 INJECTION, SOLUTION INTRAVENOUS; SUBCUTANEOUS PRN
Status: DISCONTINUED | OUTPATIENT
Start: 2025-01-10 | End: 2025-01-12

## 2025-01-10 RX ADMIN — SODIUM CHLORIDE, PRESERVATIVE FREE 10 ML: 5 INJECTION INTRAVENOUS at 21:13

## 2025-01-10 RX ADMIN — ACETAMINOPHEN 650 MG: 325 TABLET ORAL at 21:41

## 2025-01-10 RX ADMIN — HEPARIN SODIUM 7300 UNITS: 1000 INJECTION INTRAVENOUS; SUBCUTANEOUS at 14:52

## 2025-01-10 RX ADMIN — HEPARIN SODIUM 18 UNITS/KG/HR: 10000 INJECTION, SOLUTION INTRAVENOUS at 14:58

## 2025-01-10 RX ADMIN — IOPAMIDOL 75 ML: 755 INJECTION, SOLUTION INTRAVENOUS at 15:24

## 2025-01-10 SDOH — ECONOMIC STABILITY: FOOD INSECURITY: WITHIN THE PAST 12 MONTHS, THE FOOD YOU BOUGHT JUST DIDN'T LAST AND YOU DIDN'T HAVE MONEY TO GET MORE.: NEVER TRUE

## 2025-01-10 SDOH — ECONOMIC STABILITY: FOOD INSECURITY: WITHIN THE PAST 12 MONTHS, YOU WORRIED THAT YOUR FOOD WOULD RUN OUT BEFORE YOU GOT MONEY TO BUY MORE.: NEVER TRUE

## 2025-01-10 ASSESSMENT — PATIENT HEALTH QUESTIONNAIRE - PHQ9
5. POOR APPETITE OR OVEREATING: NOT AT ALL
4. FEELING TIRED OR HAVING LITTLE ENERGY: NOT AT ALL
10. IF YOU CHECKED OFF ANY PROBLEMS, HOW DIFFICULT HAVE THESE PROBLEMS MADE IT FOR YOU TO DO YOUR WORK, TAKE CARE OF THINGS AT HOME, OR GET ALONG WITH OTHER PEOPLE: NOT DIFFICULT AT ALL
SUM OF ALL RESPONSES TO PHQ QUESTIONS 1-9: 0
SUM OF ALL RESPONSES TO PHQ QUESTIONS 1-9: 0
2. FEELING DOWN, DEPRESSED OR HOPELESS: NOT AT ALL
3. TROUBLE FALLING OR STAYING ASLEEP: NOT AT ALL
7. TROUBLE CONCENTRATING ON THINGS, SUCH AS READING THE NEWSPAPER OR WATCHING TELEVISION: NOT AT ALL
8. MOVING OR SPEAKING SO SLOWLY THAT OTHER PEOPLE COULD HAVE NOTICED. OR THE OPPOSITE, BEING SO FIGETY OR RESTLESS THAT YOU HAVE BEEN MOVING AROUND A LOT MORE THAN USUAL: NOT AT ALL
9. THOUGHTS THAT YOU WOULD BE BETTER OFF DEAD, OR OF HURTING YOURSELF: NOT AT ALL
1. LITTLE INTEREST OR PLEASURE IN DOING THINGS: NOT AT ALL
SUM OF ALL RESPONSES TO PHQ QUESTIONS 1-9: 0
SUM OF ALL RESPONSES TO PHQ QUESTIONS 1-9: 0
6. FEELING BAD ABOUT YOURSELF - OR THAT YOU ARE A FAILURE OR HAVE LET YOURSELF OR YOUR FAMILY DOWN: NOT AT ALL
SUM OF ALL RESPONSES TO PHQ9 QUESTIONS 1 & 2: 0

## 2025-01-10 ASSESSMENT — PAIN DESCRIPTION - ORIENTATION
ORIENTATION: MID
ORIENTATION: MID;LOWER

## 2025-01-10 ASSESSMENT — PAIN DESCRIPTION - ONSET: ONSET: ON-GOING

## 2025-01-10 ASSESSMENT — ENCOUNTER SYMPTOMS
BACK PAIN: 1
SHORTNESS OF BREATH: 1
GASTROINTESTINAL NEGATIVE: 1
EYES NEGATIVE: 1

## 2025-01-10 ASSESSMENT — PAIN DESCRIPTION - LOCATION
LOCATION: CHEST
LOCATION: BACK

## 2025-01-10 ASSESSMENT — PAIN SCALES - GENERAL
PAINLEVEL_OUTOF10: 2
PAINLEVEL_OUTOF10: 4

## 2025-01-10 ASSESSMENT — PAIN - FUNCTIONAL ASSESSMENT
PAIN_FUNCTIONAL_ASSESSMENT: PREVENTS OR INTERFERES WITH ALL ACTIVE AND SOME PASSIVE ACTIVITIES
PAIN_FUNCTIONAL_ASSESSMENT: 0-10
PAIN_FUNCTIONAL_ASSESSMENT: ACTIVITIES ARE NOT PREVENTED

## 2025-01-10 ASSESSMENT — LIFESTYLE VARIABLES
HOW OFTEN DO YOU HAVE A DRINK CONTAINING ALCOHOL: NEVER
HOW OFTEN DO YOU HAVE A DRINK CONTAINING ALCOHOL: NEVER
HOW MANY STANDARD DRINKS CONTAINING ALCOHOL DO YOU HAVE ON A TYPICAL DAY: PATIENT DOES NOT DRINK

## 2025-01-10 ASSESSMENT — PAIN DESCRIPTION - DESCRIPTORS
DESCRIPTORS: ACHING;DISCOMFORT
DESCRIPTORS: ACHING

## 2025-01-10 ASSESSMENT — PAIN DESCRIPTION - FREQUENCY: FREQUENCY: INTERMITTENT

## 2025-01-10 ASSESSMENT — PAIN DESCRIPTION - PAIN TYPE: TYPE: ACUTE PAIN

## 2025-01-10 NOTE — CARE COORDINATION
Case Management Assessment  Initial Evaluation    Date/Time of Evaluation: 1/10/2025 6:50 PM  Assessment Completed by: Ester Hammond, RN, BSN    If patient is discharged prior to next notation, then this note serves as note for discharge by case management.    Patient Name: Megan Garcia                   YOB: 1940  Diagnosis: Saddle embolus of pulmonary artery without acute cor pulmonale (HCC) [I26.92]  Acute saddle pulmonary embolism without acute cor pulmonale (HCC) [I26.92]  Atrial fibrillation, unspecified type (HCC) [I48.91]                   Date / Time: 1/10/2025 11:47 AM    Patient Admission Status: Inpatient   Readmission Risk (Low < 19, Mod (19-27), High > 27): Readmission Risk Score: 8.9    Current PCP: Bing Lester APRN - CNP  PCP verified by CM? Yes (11/20/24)    Chart Reviewed: Yes      History Provided by: Patient  Patient Orientation: Alert and Oriented, Person, Place, Situation, Self    Patient Cognition: Alert    Hospitalization in the last 30 days (Readmission):  No    If yes, Readmission Assessment in CM Navigator will be completed.    Advance Directives:      Code Status: Full Code   Patient's Primary Decision Maker is: Legal Next of Kin    Primary Decision Maker: Lizzy Rodgers - Child - [phone]    Secondary Decision Maker: ELIZABETH GARCIA - Child - [phone]    Discharge Planning:    Patient lives with: Family Members Type of Home: House  Primary Care Giver: Self  Patient Support Systems include: Family Members, Children   Current Financial resources: Medicare  Current community resources: None  Current services prior to admission: C-pap            Current DME: Cpap, Shower Chair, Walker, Other (Comment) (ADJUSTABLE BED & LIFT CHAIR)            Type of Home Care services:  None    ADLS  Prior functional level: Assistance with the following:, Housework, Shopping, Mobility, Other (see comment) ([employer] & [employer] HOUSE[employer]OLD RESPONSIBILITIES, PT ALSO DOES LAUNDRY.

## 2025-01-10 NOTE — PROGRESS NOTES
[facility]  [address], [address]  [facility] [address]  Dept: [phone]  Loc: [phone]     Pt Name: Megan Garcia  MRN: [id number]  Birthdate 1940      HISTORY OF PRESENT ILLNESS    Megan Garcia is a 84 y.o. female who presents to the [facility] with chief complaint of noticing of pain on her mid back that started 6 days ago. She says it started out as a constant pain. She has been taking tylenol for pain all week and that has helped her pain, but now she has severe shortness of breath and it's starting to cause chest pain.  We stopped at this point and patient needs to go directly to the ER. Daughter said she was okay driving her.  Her vital signs are stable. Her daughter wanted to call the ambulance on Monday, but patient was scared and did not want to go to the ER.  This has been going on for 6 days and progressing. Patient also has a history of polycythemia and elevated hemoglobin. She has not seen hematology for awhile, but felt she didn't have complete direction on when to follow up.         REVIEW OF SYSTEMS       Review of Systems   Constitutional: Negative.    HENT: Negative.     Eyes: Negative.    Respiratory:  Positive for shortness of breath.    Cardiovascular:  Positive for chest pain.   Gastrointestinal: Negative.    Endocrine: Negative.    Genitourinary: Negative.    Musculoskeletal:  Positive for back pain.   Skin: Negative.    Neurological: Negative.    Psychiatric/Behavioral: Negative.           PAST MEDICAL HISTORY     Past Medical History:   Diagnosis Date    Bladder prolapse, female, acquired     Chest pain 10/14/2017    Degenerative arthritis of cervical spine 06/29/2012    Diverticulosis     ALEJANDRE (dyspnea on exertion) 10/14/2017    ELIZABETH (generalized anxiety disorder)     Generalized osteoarthritis of multiple sites     knees and neck    Heart palpitations 11/13/2017    History of bone density

## 2025-01-10 NOTE — H&P
HISTORY AND PHYSICAL             Date: 1/10/2025        Patient Name: Megan Garcia     YOB: 1940      Age:  84 y.o.    Chief Complaint   Chest and back pain      History Obtained From   patient, electronic medical record    History of Present Illness   Patient is a 84-year-old female who comes to the ED for complaints of chest pain.  Patient reports chest pain that started 6 days ago.  Chest pain got increasingly worse today.  She reports pain radiating to her back.  She also reports mild shortness of breath.  No aggravating or relieving factors.  Reports history of atrial flutter on metoprolol.  Denies any recent travel history or surgeries.  Denies numbness tingling, headache dizziness.  Patient denies smoking or home use of oxygen.  Currently on 2 L oxygen SpO2 95% CTA of the chest saddle emboli bilateral lower lobes.  Small to moderate left and small right pleural effusions.  EKG atrial fibrillation.  Pa cardiology have been consulted.  Tient started on heparin drip.  Patient has a past medical history of ALEJANDRE, LAKSHMI, heart palpitations, hypertension, hyperlipidemia, hypothyroidism, and obesity.    Past Medical History     Past Medical History:   Diagnosis Date    Bladder prolapse, female, acquired     Chest pain 10/14/2017    Degenerative arthritis of cervical spine 06/29/2012    Diverticulosis     ALEJANDRE (dyspnea on exertion) 10/14/2017    ELIZABETH (generalized anxiety disorder)     Generalized osteoarthritis of multiple sites     knees and neck    Heart palpitations 11/13/2017    History of bone density study 09/2014    Hyperlipidemia 06/29/2012    Hypertension 06/29/2012    Hypothyroidism 06/29/2012    Internal hemorrhoid     Obesity     Osteopenia 09/2014    Seborrheic dermatitis of scalp     Sleep apnea         Past Surgical History     Past Surgical History:   Procedure Laterality Date    APPENDECTOMY      ARTHROSCOPY / ARTHROTOMY KNEE Right 10/14/2016    RIGHT KNEE ARTHROSCOPY / LOOSE BODY / PAT ON  infiltrate not excluded.       Assessment      Hospital Problems             Last Modified POA    * (Principal) Saddle embolus of pulmonary artery without acute cor pulmonale (HCC) 1/10/2025 Yes       Plan   *Saddle emboli and pulmonary artery without acute cor pulmonale, ALEJANDRE,LAKSHMI  -Telemetry; oxygen protocol  -Heparin drip;Anti-XA, Heparin now  and q 6 hours for 3 days  -Consult cardiology      *Heart palpitations, hypertension, hyperlipidemia  -Home medication includes statin 40 mg, metoprolol 50 mg XL twice daily    * Hypothyroidism  -Home medication includes levothyroxine 50 mcg    Additional work up or/and treatment plan may be added today or then after based on clinical progression by other providers or specialists.  Patient will need to follow-up with PCP for chronic disease management.     I have spent greater than 70% of time  spent focused exclusively on this patient ,reviewing  chart, labs/diagnostics, reconciling medications, &  answering questions with patient and discussing plan.  Consultations Ordered:  IP CONSULT TO CARDIOLOGY    Electronically signed by LYNDA Encarnacion CNP on 1/10/25 at 4:07 PM EST

## 2025-01-10 NOTE — ED PROVIDER NOTES
[facility] EMERGENCY DEPARTMENT  EMERGENCY DEPARTMENT ENCOUNTER      Pt Name: Megan Garcia  MRN: [id number]  Birthdate 1940  Date of evaluation: 1/10/2025  Provider: Luis Antonio Henao DO  12:50 PM EST    CHIEF COMPLAINT     No chief complaint on file.        HISTORY OF PRESENT ILLNESS   (Location/Symptom, Timing/Onset, Context/Setting, Quality, Duration, Modifying Factors, Severity)  Note limiting factors.   Megan Garcia is a 84 y.o. female who presents to the emergency department for back pain    Patient presenting for evaluation of back pain.  Patient has pain behind her right ribs.  Patient notes it has been present for multiple days.  Patient denies recent trauma.  Denies recent illness.  Patient notes she only has cough when she takes deep breath.  Patient does have pain when she takes deep breath.  Patient denies taking blood thinning agents.  Patient denies any recent travel.  Patient denies any history of blood clots.  Patient denies chest pain.  Patient notes she is short of breath.    The history is provided by the patient.       Nursing Notes were reviewed.    REVIEW OF SYSTEMS    (2-9 systems for level 4, 10 or more for level 5)     Review of Systems   Constitutional:  Negative for chills and fever.   HENT:  Negative for rhinorrhea and sore throat.    Eyes:  Negative for pain and visual disturbance.   Respiratory:  Positive for cough and shortness of breath.    Cardiovascular:  Negative for chest pain and palpitations.   Gastrointestinal:  Negative for abdominal pain, diarrhea, nausea and vomiting.   Genitourinary:  Negative for difficulty urinating and dysuria.   Musculoskeletal:  Positive for back pain. Negative for neck pain.   Skin:  Negative for rash.   Neurological:  Negative for weakness, numbness and headaches.       Except as noted above the remainder of the review of systems was reviewed and negative.       PAST MEDICAL HISTORY     Past Medical History:   Diagnosis Date    Bladder

## 2025-01-10 NOTE — ED TRIAGE NOTES
Pt states pain started right mid back side area  Pt states that painful when attempting to take a deep breathe  Pt dry cough at times   Pt denies any fever   Pt states no difficulty urinating

## 2025-01-10 NOTE — ACP (ADVANCE CARE PLANNING)
Advance Care Planning   Healthcare Decision Maker:    Primary Decision Maker: Lizzy Rodgers - Child - [phone]    Secondary Decision Maker: ELIZABETH WOODALL - Child - [phone]    Click here to complete Healthcare Decision Makers including selection of the Healthcare Decision Maker Relationship (ie \"Primary\").  Today we documented Decision Maker(s) consistent with Legal Next of Kin hierarchy.       Per dtr has POA document at home will attempt to bring in.    Electronically signed by Ester Hammond, RN, BSN on 1/10/2025 at 6:50 PM

## 2025-01-11 ENCOUNTER — APPOINTMENT (OUTPATIENT)
Age: 85
DRG: 175 | End: 2025-01-11
Attending: INTERNAL MEDICINE
Payer: MEDICARE

## 2025-01-11 ENCOUNTER — APPOINTMENT (OUTPATIENT)
Dept: ULTRASOUND IMAGING | Age: 85
DRG: 175 | End: 2025-01-11
Payer: MEDICARE

## 2025-01-11 PROBLEM — I26.02 ACUTE SADDLE PULMONARY EMBOLISM WITH ACUTE COR PULMONALE (HCC): Status: ACTIVE | Noted: 2025-01-10

## 2025-01-11 LAB
ANION GAP SERPL CALCULATED.3IONS-SCNC: 9 MEQ/L (ref 9–15)
ANTI-XA UNFRAC HEPARIN: 0.51 IU/ML
ANTI-XA UNFRAC HEPARIN: 0.68 IU/ML
ANTI-XA UNFRAC HEPARIN: 0.85 IU/ML
BASOPHILS # BLD: 0.1 K/UL (ref 0–0.2)
BASOPHILS NFR BLD: 0.8 %
BUN SERPL-MCNC: 12 MG/DL (ref 8–23)
CALCIUM SERPL-MCNC: 8.8 MG/DL (ref 8.5–9.9)
CHLORIDE SERPL-SCNC: 103 MEQ/L (ref 95–107)
CO2 SERPL-SCNC: 26 MEQ/L (ref 20–31)
CREAT SERPL-MCNC: 0.72 MG/DL (ref 0.5–0.9)
ECHO AO ROOT DIAM: 3 CM
ECHO AO ROOT INDEX: 1.57 CM/M2
ECHO AR MAX VEL PISA: 4.2 M/S
ECHO AV AREA PEAK VELOCITY: 1.8 CM2
ECHO AV AREA PLAN/BSA: 0.99 CM2/M2
ECHO AV AREA PLAN: 1.9 CM2
ECHO AV AREA VTI: 1.8 CM2
ECHO AV AREA/BSA PEAK VELOCITY: 0.9 CM2/M2
ECHO AV AREA/BSA VTI: 0.9 CM2/M2
ECHO AV CUSP MM: 1.1 CM
ECHO AV MEAN GRADIENT: 3 MMHG
ECHO AV MEAN VELOCITY: 0.8 M/S
ECHO AV PEAK GRADIENT: 6 MMHG
ECHO AV PEAK VELOCITY: 1.2 M/S
ECHO AV REGURGITANT PHT: 447.2 MILLISECOND
ECHO AV VELOCITY RATIO: 0.75
ECHO AV VTI: 21 CM
ECHO BSA: 1.99 M2
ECHO LA DIAMETER INDEX: 1.88 CM/M2
ECHO LA DIAMETER: 3.6 CM
ECHO LA TO AORTIC ROOT RATIO: 1.2
ECHO LA VOL A-L A2C: 53 ML (ref 22–52)
ECHO LA VOL A-L A4C: 79 ML (ref 22–52)
ECHO LA VOL MOD A2C: 51 ML (ref 22–52)
ECHO LA VOL MOD A4C: 72 ML (ref 22–52)
ECHO LA VOLUME AREA LENGTH: 65 ML
ECHO LA VOLUME INDEX A-L A2C: 28 ML/M2 (ref 16–34)
ECHO LA VOLUME INDEX A-L A4C: 41 ML/M2 (ref 16–34)
ECHO LA VOLUME INDEX AREA LENGTH: 34 ML/M2 (ref 16–34)
ECHO LA VOLUME INDEX MOD A2C: 27 ML/M2 (ref 16–34)
ECHO LA VOLUME INDEX MOD A4C: 38 ML/M2 (ref 16–34)
ECHO LV EDV A2C: 52 ML
ECHO LV EDV A4C: 61 ML
ECHO LV EDV BP: 58 ML (ref 56–104)
ECHO LV EDV INDEX A4C: 32 ML/M2
ECHO LV EDV INDEX BP: 30 ML/M2
ECHO LV EDV NDEX A2C: 27 ML/M2
ECHO LV EJECTION FRACTION A2C: 52 %
ECHO LV EJECTION FRACTION A4C: 56 %
ECHO LV EJECTION FRACTION BIPLANE: 53 % (ref 55–100)
ECHO LV ESV A2C: 25 ML
ECHO LV ESV A4C: 27 ML
ECHO LV ESV BP: 27 ML (ref 19–49)
ECHO LV ESV INDEX A2C: 13 ML/M2
ECHO LV ESV INDEX A4C: 14 ML/M2
ECHO LV ESV INDEX BP: 14 ML/M2
ECHO LV FRACTIONAL SHORTENING: 34 % (ref 28–44)
ECHO LV INTERNAL DIMENSION DIASTOLE INDEX: 2.15 CM/M2
ECHO LV INTERNAL DIMENSION DIASTOLIC: 4.1 CM (ref 3.9–5.3)
ECHO LV INTERNAL DIMENSION SYSTOLIC INDEX: 1.41 CM/M2
ECHO LV INTERNAL DIMENSION SYSTOLIC: 2.7 CM
ECHO LV IVSD: 1.2 CM (ref 0.6–0.9)
ECHO LV IVSS: 1.3 CM
ECHO LV MASS 2D: 171.7 G (ref 67–162)
ECHO LV MASS INDEX 2D: 89.9 G/M2 (ref 43–95)
ECHO LV POSTERIOR WALL DIASTOLIC: 1.2 CM (ref 0.6–0.9)
ECHO LV POSTERIOR WALL SYSTOLIC: 1.1 CM
ECHO LV RELATIVE WALL THICKNESS RATIO: 0.59
ECHO LVOT AREA: 2.5 CM2
ECHO LVOT AV VTI INDEX: 0.71
ECHO LVOT DIAM: 1.8 CM
ECHO LVOT MEAN GRADIENT: 1 MMHG
ECHO LVOT PEAK GRADIENT: 2 MMHG
ECHO LVOT PEAK VELOCITY: 0.9 M/S
ECHO LVOT STROKE VOLUME INDEX: 19.8 ML/M2
ECHO LVOT SV: 37.9 ML
ECHO LVOT VTI: 14.9 CM
ECHO MV E VELOCITY: 0.92 M/S
ECHO PV MAX VELOCITY: 0.8 M/S
ECHO PV PEAK GRADIENT: 2 MMHG
ECHO RV INTERNAL DIMENSION: 2.6 CM
ECHO RV TAPSE: 1.7 CM (ref 1.7–?)
ECHO TV REGURGITANT MAX VELOCITY: 2.58 M/S
ECHO TV REGURGITANT PEAK GRADIENT: 27 MMHG
EKG ATRIAL RATE: 78 BPM
EKG Q-T INTERVAL: 332 MS
EKG QRS DURATION: 88 MS
EKG QTC CALCULATION (BAZETT): 406 MS
EKG R AXIS: -24 DEGREES
EKG T AXIS: 55 DEGREES
EKG VENTRICULAR RATE: 90 BPM
EOSINOPHIL # BLD: 0.1 K/UL (ref 0–0.7)
EOSINOPHIL NFR BLD: 2 %
ERYTHROCYTE [DISTWIDTH] IN BLOOD BY AUTOMATED COUNT: 13.5 % (ref 11.5–14.5)
GLUCOSE SERPL-MCNC: 98 MG/DL (ref 70–99)
HCT VFR BLD AUTO: 44.4 % (ref 37–47)
HGB BLD-MCNC: 14.8 G/DL (ref 12–16)
LYMPHOCYTES # BLD: 2 K/UL (ref 1–4.8)
LYMPHOCYTES NFR BLD: 32.8 %
MCH RBC QN AUTO: 29.7 PG (ref 27–31.3)
MCHC RBC AUTO-ENTMCNC: 33.3 % (ref 33–37)
MCV RBC AUTO: 89.2 FL (ref 79.4–94.8)
MONOCYTES # BLD: 0.9 K/UL (ref 0.2–0.8)
MONOCYTES NFR BLD: 14.4 %
NEUTROPHILS # BLD: 3 K/UL (ref 1.4–6.5)
NEUTS SEG NFR BLD: 49.8 %
PLATELET # BLD AUTO: 242 K/UL (ref 130–400)
POTASSIUM SERPL-SCNC: 3.7 MEQ/L (ref 3.4–4.9)
RBC # BLD AUTO: 4.98 M/UL (ref 4.2–5.4)
SODIUM SERPL-SCNC: 138 MEQ/L (ref 135–144)
WBC # BLD AUTO: 6 K/UL (ref 4.8–10.8)

## 2025-01-11 PROCEDURE — 93970 EXTREMITY STUDY: CPT

## 2025-01-11 PROCEDURE — 2060000000 HC ICU INTERMEDIATE R&B

## 2025-01-11 PROCEDURE — 85025 COMPLETE CBC W/AUTO DIFF WBC: CPT

## 2025-01-11 PROCEDURE — 6370000000 HC RX 637 (ALT 250 FOR IP): Performed by: FAMILY MEDICINE

## 2025-01-11 PROCEDURE — 80048 BASIC METABOLIC PNL TOTAL CA: CPT

## 2025-01-11 PROCEDURE — 93306 TTE W/DOPPLER COMPLETE: CPT

## 2025-01-11 PROCEDURE — 93010 ELECTROCARDIOGRAM REPORT: CPT | Performed by: INTERNAL MEDICINE

## 2025-01-11 PROCEDURE — 6360000002 HC RX W HCPCS: Performed by: INTERNAL MEDICINE

## 2025-01-11 PROCEDURE — 6370000000 HC RX 637 (ALT 250 FOR IP): Performed by: INTERNAL MEDICINE

## 2025-01-11 PROCEDURE — 36415 COLL VENOUS BLD VENIPUNCTURE: CPT

## 2025-01-11 PROCEDURE — 85520 HEPARIN ASSAY: CPT

## 2025-01-11 PROCEDURE — 93306 TTE W/DOPPLER COMPLETE: CPT | Performed by: INTERNAL MEDICINE

## 2025-01-11 PROCEDURE — 99223 1ST HOSP IP/OBS HIGH 75: CPT | Performed by: INTERNAL MEDICINE

## 2025-01-11 RX ORDER — LEVOTHYROXINE SODIUM 50 UG/1
50 TABLET ORAL SEE ADMIN INSTRUCTIONS
Status: DISCONTINUED | OUTPATIENT
Start: 2025-01-11 | End: 2025-01-14 | Stop reason: HOSPADM

## 2025-01-11 RX ORDER — M-VIT,TX,IRON,MINS/CALC/FOLIC 27MG-0.4MG
1 TABLET ORAL
Status: DISCONTINUED | OUTPATIENT
Start: 2025-01-11 | End: 2025-01-14 | Stop reason: HOSPADM

## 2025-01-11 RX ORDER — FUROSEMIDE 20 MG/1
20 TABLET ORAL DAILY
Status: DISCONTINUED | OUTPATIENT
Start: 2025-01-11 | End: 2025-01-14 | Stop reason: HOSPADM

## 2025-01-11 RX ORDER — CALCIUM CARBONATE 500 MG/1
1 TABLET, CHEWABLE ORAL DAILY
Status: DISCONTINUED | OUTPATIENT
Start: 2025-01-11 | End: 2025-01-14 | Stop reason: HOSPADM

## 2025-01-11 RX ORDER — BUSPIRONE HYDROCHLORIDE 10 MG/1
5 TABLET ORAL 3 TIMES DAILY
Status: DISCONTINUED | OUTPATIENT
Start: 2025-01-11 | End: 2025-01-14 | Stop reason: HOSPADM

## 2025-01-11 RX ORDER — PRAVASTATIN SODIUM 40 MG
40 TABLET ORAL NIGHTLY
Status: DISCONTINUED | OUTPATIENT
Start: 2025-01-11 | End: 2025-01-14 | Stop reason: HOSPADM

## 2025-01-11 RX ORDER — METOPROLOL SUCCINATE 50 MG/1
50 TABLET, EXTENDED RELEASE ORAL 2 TIMES DAILY
Status: DISCONTINUED | OUTPATIENT
Start: 2025-01-11 | End: 2025-01-14 | Stop reason: HOSPADM

## 2025-01-11 RX ORDER — VITAMIN B COMPLEX
1000 TABLET ORAL DAILY
Status: DISCONTINUED | OUTPATIENT
Start: 2025-01-11 | End: 2025-01-14 | Stop reason: HOSPADM

## 2025-01-11 RX ADMIN — METOPROLOL SUCCINATE 50 MG: 50 TABLET, EXTENDED RELEASE ORAL at 20:32

## 2025-01-11 RX ADMIN — PRAVASTATIN SODIUM 40 MG: 40 TABLET ORAL at 20:32

## 2025-01-11 RX ADMIN — BUSPIRONE HYDROCHLORIDE 5 MG: 10 TABLET ORAL at 09:40

## 2025-01-11 RX ADMIN — Medication 1 TABLET: at 09:50

## 2025-01-11 RX ADMIN — BUSPIRONE HYDROCHLORIDE 5 MG: 10 TABLET ORAL at 20:32

## 2025-01-11 RX ADMIN — Medication 1000 UNITS: at 09:50

## 2025-01-11 RX ADMIN — ANTACID TABLETS 500 MG: 500 TABLET, CHEWABLE ORAL at 09:39

## 2025-01-11 RX ADMIN — HEPARIN SODIUM 13 UNITS/KG/HR: 10000 INJECTION, SOLUTION INTRAVENOUS at 09:56

## 2025-01-11 RX ADMIN — LEVOTHYROXINE SODIUM 75 MCG: 0.05 TABLET ORAL at 09:39

## 2025-01-11 RX ADMIN — FUROSEMIDE 20 MG: 20 TABLET ORAL at 11:57

## 2025-01-11 RX ADMIN — METOPROLOL SUCCINATE 50 MG: 50 TABLET, EXTENDED RELEASE ORAL at 09:40

## 2025-01-11 RX ADMIN — BUSPIRONE HYDROCHLORIDE 5 MG: 10 TABLET ORAL at 15:00

## 2025-01-11 ASSESSMENT — PAIN DESCRIPTION - PAIN TYPE: TYPE: ACUTE PAIN

## 2025-01-11 ASSESSMENT — PAIN DESCRIPTION - LOCATION
LOCATION: GENERALIZED

## 2025-01-11 ASSESSMENT — PAIN SCALES - GENERAL
PAINLEVEL_OUTOF10: 0

## 2025-01-11 ASSESSMENT — PAIN DESCRIPTION - DESCRIPTORS: DESCRIPTORS: DULL;ACHING

## 2025-01-11 ASSESSMENT — PAIN DESCRIPTION - ORIENTATION: ORIENTATION: MID;LOWER

## 2025-01-11 ASSESSMENT — PAIN DESCRIPTION - ONSET: ONSET: ON-GOING

## 2025-01-11 ASSESSMENT — PAIN - FUNCTIONAL ASSESSMENT: PAIN_FUNCTIONAL_ASSESSMENT: ACTIVITIES ARE NOT PREVENTED

## 2025-01-11 ASSESSMENT — PAIN DESCRIPTION - FREQUENCY: FREQUENCY: INTERMITTENT

## 2025-01-11 NOTE — H&P (VIEW-ONLY)
DATE OF CONSULTATION  1/11/2025    CONSULTANT  Baron Mares DO     REQUESTING PHYSICIAN  Jluis Cote MD         REASON FOR CONSULTATION  No chief complaint on file.      Hospital Day: 1       Patient is a 84 y.o. female who presents with a chief complaint of shortness of breath as well as chest pain. Patient is followed on a regular basis by Bing Francois, APRN - CNP.  Patient with past medical history of hypertension, hyperlipidemia, hypothyroidism who presents with chest pain/back pain as well as shortness of breath and worse with very minimal exertion.  Patient was noted to have saddle pulmonary embolism without evidence of RV, pulmonary edema, small to moderate left and small right pleural effusions.  No prior history of thromboembolic disease.  She denies any history of cancer.  She denies tobacco abuse.  No recent prolonged trips.  No recent trauma.BNP is elevated at 1150.  Troponin is negative so far.  Blood pressure is within normal range and heart rates in 102 at rest.  Patient states he gets very short of breath with very minimal exertion    Past Medical History:   Diagnosis Date    Bladder prolapse, female, acquired     Chest pain 10/14/2017    Degenerative arthritis of cervical spine 06/29/2012    Diverticulosis     ALEJANDRE (dyspnea on exertion) 10/14/2017    ELIZABETH (generalized anxiety disorder)     Generalized osteoarthritis of multiple sites     knees and neck    Heart palpitations 11/13/2017    History of bone density study 09/2014    Hyperlipidemia 06/29/2012    Hypertension 06/29/2012    Hypothyroidism 06/29/2012    Internal hemorrhoid     Obesity     Osteopenia 09/2014    Seborrheic dermatitis of scalp     Sleep apnea       Patient Active Problem List   Diagnosis    Hypertension    Hyperlipidemia    Hypothyroidism    Degenerative arthritis of cervical spine    Osteoarthritis of knees, bilateral    Seborrheic dermatitis of scalp    Generalized osteoarthritis of multiple sites    ELIZABETH

## 2025-01-11 NOTE — CONSULTS
DATE OF CONSULTATION  1/11/2025    CONSULTANT  Baron Mares DO     REQUESTING PHYSICIAN  Jluis Cote MD         REASON FOR CONSULTATION  No chief complaint on file.      Hospital Day: 1       Patient is a 84 y.o. female who presents with a chief complaint of shortness of breath as well as chest pain. Patient is followed on a regular basis by Bing Francois, APRN - CNP.  Patient with past medical history of hypertension, hyperlipidemia, hypothyroidism who presents with chest pain/back pain as well as shortness of breath and worse with very minimal exertion.  Patient was noted to have saddle pulmonary embolism without evidence of RV, pulmonary edema, small to moderate left and small right pleural effusions.  No prior history of thromboembolic disease.  She denies any history of cancer.  She denies tobacco abuse.  No recent prolonged trips.  No recent trauma.BNP is elevated at 1150.  Troponin is negative so far.  Blood pressure is within normal range and heart rates in 102 at rest.  Patient states he gets very short of breath with very minimal exertion    Past Medical History:   Diagnosis Date    Bladder prolapse, female, acquired     Chest pain 10/14/2017    Degenerative arthritis of cervical spine 06/29/2012    Diverticulosis     ALEJANDRE (dyspnea on exertion) 10/14/2017    ELIZABETH (generalized anxiety disorder)     Generalized osteoarthritis of multiple sites     knees and neck    Heart palpitations 11/13/2017    History of bone density study 09/2014    Hyperlipidemia 06/29/2012    Hypertension 06/29/2012    Hypothyroidism 06/29/2012    Internal hemorrhoid     Obesity     Osteopenia 09/2014    Seborrheic dermatitis of scalp     Sleep apnea       Patient Active Problem List   Diagnosis    Hypertension    Hyperlipidemia    Hypothyroidism    Degenerative arthritis of cervical spine    Osteoarthritis of knees, bilateral    Seborrheic dermatitis of scalp    Generalized osteoarthritis of multiple sites    ELIZABETH  enlargement of the pulmonary trunk. Mediastinum: No evidence of mediastinal lymphadenopathy.  There is somewhat advanced cardiomegaly.  There is no acute abnormality of the thoracic aorta. Lungs/pleura: Small to moderate left and small right pleural effusions.  Mild ground-glass mosaic attenuation in the upper lung zones as well as in the bilateral lower lobes with lower lobe smooth interlobular septal thickening. Moderate densities are seen in the left lower lobe which are nonspecific. Upper Abdomen: Limited images of the upper abdomen are unremarkable. Soft Tissues/Bones: Age-related degenerative changes of the osseous structures is noted, without suspicious lesion.  No significant soft tissue abnormality is identified.     1. Linear nonocclusive saddle embolus, with moderate to large occlusive and partially occlusive emboli in lobar and segmental branches to the bilateral lower lobes.  No evidence of right heart strain. 2. Mild ground-glass mosaic attenuation in the upper lung zones as well as in the bilateral lower lobes with lower lobe smooth interlobular septal thickening. Findings are suggestive of pulmonary edema.  Small to moderate left and small right pleural effusions 3. Moderate densities in the left lower lobe are nonspecific but felt to represent atelectasis These findings were discussed with Dr. Henao at 3:40 p.m.     XR CHEST (2 VW)    Result Date: 1/10/2025  EXAMINATION: TWO XRAY VIEWS OF THE CHEST 1/10/2025 12:42 pm COMPARISON: October 13, 2017 HISTORY: ORDERING SYSTEM PROVIDED HISTORY: SOB TECHNOLOGIST PROVIDED HISTORY: Reason for exam:->SOB What reading provider will be dictating this exam?->CRC FINDINGS: There is coarsening of the interstitium.  Bilateral small pleural effusions are seen.  The cardiac silhouette is mildly enlarged.  Degenerative changes are seen in the spine as well as osteopenia.     Mild CHF; superimposed infiltrate not excluded.       EKG: normal sinus rhythm, nonspecific ST

## 2025-01-11 NOTE — PROGRESS NOTES
Hospitalist Daily Progress Note  Name: Megan Garcia  Age: 84 y.o.  Gender: female  CodeStatus: Full Code  Allergies: No Known Allergies    Chief Complaint:No chief complaint on file.      Primary Care Provider: Bing Lester APRN - CNP    InpatientTreatment Team: Treatment Team:   Jluis Cote MD Holiday, Wes J, DO Rosso, Jennifer A, RN Rothhaar, Pat Anaya, Kasia Moore, Lizzy Araujo, SILVIA Hunter, Yin RENDON RN    Admission Date: 1/10/2025      Subjective: No chest pain, nausea.  SOB moderate.  Patient anxious.    Physical Exam  Vitals and nursing note reviewed.   Constitutional:       Appearance: Normal appearance.   Cardiovascular:      Rate and Rhythm: Normal rate and regular rhythm.   Pulmonary:      Effort: Pulmonary effort is normal.      Breath sounds: Normal breath sounds.   Abdominal:      General: Bowel sounds are normal.      Palpations: Abdomen is soft.   Musculoskeletal:         General: Normal range of motion.   Skin:     General: Skin is warm and dry.   Neurological:      General: No focal deficit present.      Mental Status: She is alert. Mental status is at baseline.         Medications:  Reviewed    Infusion Medications:    heparin (PORCINE) Infusion 13 Units/kg/hr (01/11/25 0800)    sodium chloride       Scheduled Medications:    busPIRone  5 mg Oral TID    calcium carbonate  1 tablet Oral Daily    levothyroxine  50 mcg Oral See Admin Instructions    levothyroxine  75 mcg Oral See Admin Instructions    metoprolol succinate  50 mg Oral BID    pravastatin  40 mg Oral Nightly    vitamin D  1,000 Units Oral Daily    PreserVision AREDS  1 each Oral Daily with breakfast    sodium chloride flush  5-40 mL IntraVENous 2 times per day     PRN Meds: heparin (porcine), heparin (porcine), sodium chloride flush, sodium chloride, ondansetron **OR** ondansetron, polyethylene glycol, acetaminophen **OR** acetaminophen    Labs:   Recent Labs     01/10/25  1238 01/11/25  0550   WBC 7.8 6.0

## 2025-01-12 LAB
ANION GAP SERPL CALCULATED.3IONS-SCNC: 11 MEQ/L (ref 9–15)
ANTI-XA UNFRAC HEPARIN: 0.59 IU/ML
BUN SERPL-MCNC: 15 MG/DL (ref 8–23)
CALCIUM SERPL-MCNC: 9 MG/DL (ref 8.5–9.9)
CHLORIDE SERPL-SCNC: 104 MEQ/L (ref 95–107)
CO2 SERPL-SCNC: 25 MEQ/L (ref 20–31)
CREAT SERPL-MCNC: 0.71 MG/DL (ref 0.5–0.9)
ERYTHROCYTE [DISTWIDTH] IN BLOOD BY AUTOMATED COUNT: 13.3 % (ref 11.5–14.5)
GLUCOSE SERPL-MCNC: 99 MG/DL (ref 70–99)
HCT VFR BLD AUTO: 43.6 % (ref 37–47)
HGB BLD-MCNC: 15 G/DL (ref 12–16)
MCH RBC QN AUTO: 30.4 PG (ref 27–31.3)
MCHC RBC AUTO-ENTMCNC: 34.4 % (ref 33–37)
MCV RBC AUTO: 88.3 FL (ref 79.4–94.8)
PLATELET # BLD AUTO: 250 K/UL (ref 130–400)
POTASSIUM SERPL-SCNC: 3.9 MEQ/L (ref 3.4–4.9)
RBC # BLD AUTO: 4.94 M/UL (ref 4.2–5.4)
SODIUM SERPL-SCNC: 140 MEQ/L (ref 135–144)
WBC # BLD AUTO: 6.4 K/UL (ref 4.8–10.8)

## 2025-01-12 PROCEDURE — 36415 COLL VENOUS BLD VENIPUNCTURE: CPT

## 2025-01-12 PROCEDURE — 02CR3ZZ EXTIRPATION OF MATTER FROM LEFT PULMONARY ARTERY, PERCUTANEOUS APPROACH: ICD-10-PCS | Performed by: INTERNAL MEDICINE

## 2025-01-12 PROCEDURE — 2500000003 HC RX 250 WO HCPCS: Performed by: REGISTERED NURSE

## 2025-01-12 PROCEDURE — 6360000002 HC RX W HCPCS: Performed by: INTERNAL MEDICINE

## 2025-01-12 PROCEDURE — C1753 CATH, INTRAVAS ULTRASOUND: HCPCS | Performed by: INTERNAL MEDICINE

## 2025-01-12 PROCEDURE — 37184 PRIM ART M-THRMBC 1ST VSL: CPT | Performed by: INTERNAL MEDICINE

## 2025-01-12 PROCEDURE — 99152 MOD SED SAME PHYS/QHP 5/>YRS: CPT | Performed by: INTERNAL MEDICINE

## 2025-01-12 PROCEDURE — 2709999900 HC NON-CHARGEABLE SUPPLY: Performed by: INTERNAL MEDICINE

## 2025-01-12 PROCEDURE — 36015 PLACE CATHETER IN ARTERY: CPT | Performed by: INTERNAL MEDICINE

## 2025-01-12 PROCEDURE — 2720000010 HC SURG SUPPLY STERILE: Performed by: INTERNAL MEDICINE

## 2025-01-12 PROCEDURE — 36014 PLACE CATHETER IN ARTERY: CPT | Performed by: INTERNAL MEDICINE

## 2025-01-12 PROCEDURE — 6370000000 HC RX 637 (ALT 250 FOR IP): Performed by: FAMILY MEDICINE

## 2025-01-12 PROCEDURE — C1769 GUIDE WIRE: HCPCS | Performed by: INTERNAL MEDICINE

## 2025-01-12 PROCEDURE — 75743 ARTERY X-RAYS LUNGS: CPT | Performed by: INTERNAL MEDICINE

## 2025-01-12 PROCEDURE — 02CQ3ZZ EXTIRPATION OF MATTER FROM RIGHT PULMONARY ARTERY, PERCUTANEOUS APPROACH: ICD-10-PCS | Performed by: INTERNAL MEDICINE

## 2025-01-12 PROCEDURE — 2060000000 HC ICU INTERMEDIATE R&B

## 2025-01-12 PROCEDURE — C1894 INTRO/SHEATH, NON-LASER: HCPCS | Performed by: INTERNAL MEDICINE

## 2025-01-12 PROCEDURE — 2700000000 HC OXYGEN THERAPY PER DAY

## 2025-01-12 PROCEDURE — 85027 COMPLETE CBC AUTOMATED: CPT

## 2025-01-12 PROCEDURE — B31T1ZZ FLUOROSCOPY OF LEFT PULMONARY ARTERY USING LOW OSMOLAR CONTRAST: ICD-10-PCS | Performed by: INTERNAL MEDICINE

## 2025-01-12 PROCEDURE — 6360000004 HC RX CONTRAST MEDICATION: Performed by: INTERNAL MEDICINE

## 2025-01-12 PROCEDURE — 37185 PRIM ART M-THRMBC SBSQ VSL: CPT | Performed by: INTERNAL MEDICINE

## 2025-01-12 PROCEDURE — B31S1ZZ FLUOROSCOPY OF RIGHT PULMONARY ARTERY USING LOW OSMOLAR CONTRAST: ICD-10-PCS | Performed by: INTERNAL MEDICINE

## 2025-01-12 PROCEDURE — 6370000000 HC RX 637 (ALT 250 FOR IP): Performed by: REGISTERED NURSE

## 2025-01-12 PROCEDURE — 85347 COAGULATION TIME ACTIVATED: CPT

## 2025-01-12 PROCEDURE — 6370000000 HC RX 637 (ALT 250 FOR IP): Performed by: INTERNAL MEDICINE

## 2025-01-12 PROCEDURE — 99153 MOD SED SAME PHYS/QHP EA: CPT | Performed by: INTERNAL MEDICINE

## 2025-01-12 PROCEDURE — 85520 HEPARIN ASSAY: CPT

## 2025-01-12 PROCEDURE — 80048 BASIC METABOLIC PNL TOTAL CA: CPT

## 2025-01-12 PROCEDURE — C1757 CATH, THROMBECTOMY/EMBOLECT: HCPCS | Performed by: INTERNAL MEDICINE

## 2025-01-12 RX ORDER — SODIUM CHLORIDE 9 MG/ML
INJECTION, SOLUTION INTRAVENOUS CONTINUOUS
Status: ACTIVE | OUTPATIENT
Start: 2025-01-12 | End: 2025-01-12

## 2025-01-12 RX ORDER — HYDROCORTISONE SODIUM SUCCINATE 100 MG/2ML
200 INJECTION INTRAMUSCULAR; INTRAVENOUS ONCE
Status: DISCONTINUED | OUTPATIENT
Start: 2025-01-12 | End: 2025-01-13

## 2025-01-12 RX ORDER — PREDNISONE 50 MG/1
50 TABLET ORAL ONCE
Status: DISCONTINUED | OUTPATIENT
Start: 2025-01-12 | End: 2025-01-14 | Stop reason: HOSPADM

## 2025-01-12 RX ORDER — MIDAZOLAM HYDROCHLORIDE 1 MG/ML
INJECTION, SOLUTION INTRAMUSCULAR; INTRAVENOUS PRN
Status: DISCONTINUED | OUTPATIENT
Start: 2025-01-12 | End: 2025-01-12 | Stop reason: HOSPADM

## 2025-01-12 RX ORDER — IOPAMIDOL 612 MG/ML
INJECTION, SOLUTION INTRAVASCULAR PRN
Status: DISCONTINUED | OUTPATIENT
Start: 2025-01-12 | End: 2025-01-12 | Stop reason: HOSPADM

## 2025-01-12 RX ORDER — HEPARIN SODIUM 1000 [USP'U]/ML
INJECTION, SOLUTION INTRAVENOUS; SUBCUTANEOUS PRN
Status: DISCONTINUED | OUTPATIENT
Start: 2025-01-12 | End: 2025-01-12 | Stop reason: HOSPADM

## 2025-01-12 RX ORDER — DIPHENHYDRAMINE HCL 25 MG
50 TABLET ORAL ONCE
Status: DISCONTINUED | OUTPATIENT
Start: 2025-01-12 | End: 2025-01-13

## 2025-01-12 RX ORDER — ONDANSETRON 2 MG/ML
4 INJECTION INTRAMUSCULAR; INTRAVENOUS EVERY 6 HOURS PRN
Status: DISCONTINUED | OUTPATIENT
Start: 2025-01-12 | End: 2025-01-14 | Stop reason: HOSPADM

## 2025-01-12 RX ORDER — FENTANYL CITRATE 50 UG/ML
INJECTION, SOLUTION INTRAMUSCULAR; INTRAVENOUS PRN
Status: DISCONTINUED | OUTPATIENT
Start: 2025-01-12 | End: 2025-01-12 | Stop reason: HOSPADM

## 2025-01-12 RX ORDER — DIPHENHYDRAMINE HCL 25 MG
25 TABLET ORAL NIGHTLY PRN
Status: DISCONTINUED | OUTPATIENT
Start: 2025-01-12 | End: 2025-01-14 | Stop reason: HOSPADM

## 2025-01-12 RX ADMIN — ACETAMINOPHEN 650 MG: 325 TABLET ORAL at 18:05

## 2025-01-12 RX ADMIN — BUSPIRONE HYDROCHLORIDE 5 MG: 10 TABLET ORAL at 14:08

## 2025-01-12 RX ADMIN — APIXABAN 10 MG: 5 TABLET, FILM COATED ORAL at 21:02

## 2025-01-12 RX ADMIN — BUSPIRONE HYDROCHLORIDE 5 MG: 10 TABLET ORAL at 08:13

## 2025-01-12 RX ADMIN — METOPROLOL SUCCINATE 50 MG: 50 TABLET, EXTENDED RELEASE ORAL at 08:13

## 2025-01-12 RX ADMIN — Medication 1000 UNITS: at 08:13

## 2025-01-12 RX ADMIN — SODIUM CHLORIDE, PRESERVATIVE FREE 10 ML: 5 INJECTION INTRAVENOUS at 21:04

## 2025-01-12 RX ADMIN — BUSPIRONE HYDROCHLORIDE 5 MG: 10 TABLET ORAL at 21:15

## 2025-01-12 RX ADMIN — METOPROLOL SUCCINATE 50 MG: 50 TABLET, EXTENDED RELEASE ORAL at 21:16

## 2025-01-12 RX ADMIN — APIXABAN 10 MG: 5 TABLET, FILM COATED ORAL at 14:08

## 2025-01-12 RX ADMIN — Medication 1 TABLET: at 08:13

## 2025-01-12 RX ADMIN — ANTACID TABLETS 500 MG: 500 TABLET, CHEWABLE ORAL at 08:12

## 2025-01-12 RX ADMIN — LEVOTHYROXINE SODIUM 75 MCG: 0.05 TABLET ORAL at 08:13

## 2025-01-12 ASSESSMENT — PAIN SCALES - GENERAL
PAINLEVEL_OUTOF10: 0
PAINLEVEL_OUTOF10: 0
PAINLEVEL_OUTOF10: 4
PAINLEVEL_OUTOF10: 0

## 2025-01-12 ASSESSMENT — PAIN DESCRIPTION - LOCATION
LOCATION: BACK
LOCATION: GENERALIZED

## 2025-01-12 ASSESSMENT — PAIN DESCRIPTION - FREQUENCY
FREQUENCY: INTERMITTENT
FREQUENCY: INTERMITTENT

## 2025-01-12 ASSESSMENT — PAIN DESCRIPTION - DESCRIPTORS: DESCRIPTORS: DULL

## 2025-01-12 ASSESSMENT — PAIN - FUNCTIONAL ASSESSMENT
PAIN_FUNCTIONAL_ASSESSMENT: ACTIVITIES ARE NOT PREVENTED
PAIN_FUNCTIONAL_ASSESSMENT: ACTIVITIES ARE NOT PREVENTED

## 2025-01-12 ASSESSMENT — PAIN DESCRIPTION - ORIENTATION
ORIENTATION: RIGHT;LEFT
ORIENTATION: RIGHT;LEFT;LOWER

## 2025-01-12 ASSESSMENT — PAIN DESCRIPTION - PAIN TYPE: TYPE: ACUTE PAIN

## 2025-01-12 ASSESSMENT — PAIN DESCRIPTION - ONSET: ONSET: ON-GOING

## 2025-01-12 NOTE — FLOWSHEET NOTE
Pt is in bed watching Tv. She is A/O/ x 4, BR, 1 Assist to BSC. She takes her po medications whole w/ water. Last BM on 01/11/2025, no c/o constipation, abd pain, or diarrhea. Bilat. Lungs are clr dim, POX on 2 L at 96% via NC, no SOB or difficulties breathing. Bilat UE/LE pulses are palpable at +2, occasional cough.  She has a 20 ga IV in her Left forearm that is clean, dry, and intact. She is on a Heparin drip running at 13 units/kg/hr, 11.8 ml/hr. No c/o pain at this time. Call light is w/in reach.

## 2025-01-12 NOTE — FLOWSHEET NOTE
01/12/25 1030   Vital Signs   Pulse (!) 109   Cardiac Rhythm Atrial fib   Heart Rate Source Monitor   /76   MAP (mmHg) 82   MAP (Calculated) 86   Patient Position Semi fowlers   Respirations 16   SpO2 96 %   Pulse Oximetry Type Intermittent   Pulse Oximeter Device Mode Intermittent   O2 Flow Rate (L/min) 3 L/min   Level of Consciousness 0   Oxygen Therapy   Pulse Oximeter Device Location Right;Finger   Blood Gas  Performed? No   Oxygen Therapy Supplemental oxygen   Pain Assessment   Pain Assessment None - Denies Pain   Pain Level 0   Patient's Stated Pain Goal 0 - No pain   Pain Location Generalized   RASS Score   Osborne Agitation Sedation Scale (RASS) 0   Respiratory   Respiratory Pattern Regular   Respiratory Depth Normal   Respiratory Quality/Effort Unlabored;Dyspnea with exertion   Chest Assessment Chest expansion symmetrical   L Breath Sounds Clear;Diminished   R Breath Sounds Clear;Diminished   Site Assessment   R Radial Pulse +2 (Moderate)   L Radial Pulse +2 (Moderate)   R Pedal Pulse +2   L Pedal Pulse +2   Closure Devices   (Flowstasis)   Peripheral Vascular   Peripheral Vascular (WDL) WDL   Edema None   LUE Neurovascular Assessment   Capillary Refill Less than/Equal to 3 seconds   Color Pink   Temperature Warm   LLE Neurovascular Assessment   Capillary Refill Less than/Equal to 3 seconds   Color Pink   Temperature Warm   RUE Neurovascular Assessment   Capillary Refill Less than/Equal to 3 seconds   Color Pink   Temperature Warm   RLE Neurovascular Assessment   Capillary Refill Less than/Equal to 3 seconds   Color Pink   Temperature Warm     Patient returned from cath lab , procedure completed. Patient returned ; BR 2 hours until 1200 PM.  Patient denies any CP or acute SOB. VSS. Right femoral groin area with Flowstasis device; plan to discontinue as ordered at 1130 AM. Right groin dressing dry and intact, no drainage/hematoma/numbness or tingling RLE. R PP+2. Bed flat; family at bedside. Will

## 2025-01-12 NOTE — PROGRESS NOTES
Sedation Pre- Procedure Evaluation    Patient name: Megan Garcia  YOB: 1940  MRN: [id number]   Allergies:   Patient has no known allergies.        Type Of Sedation  [x]local anesthesia  [x]moderate (conscious sedation)  []deep/analgesia      RN Focused History    Yes        No  N/A  []   [x]  Previous problem with airway anesthesia, sedation, or family history of the same    []   [x]  History Of tobacco, alcohol, or substance abuse     [x]   []  Problems associated with stridor, snoring, or sleep apnea    []   [] [x] Pediatric patient, history of premature birth or ventilator support (Moderate Sedation Only)     [x]   [] [] Moderate Sedation: Clear liquids within 6 hours of sedation and NPO within 2 hours    [x]   [] [] Deep Sedation:Clear liquids within 6 hours of sedation and NPO within 2 hours      NPO since: sips with meds this am        Vitals, Cardiac Rhythm, Pain:   Vitals  Temp: 97.4 °F (36.3 °C)  Temp Source: Oral  Pulse: 87  Heart Rate Source: Monitor  Respirations: 16  BP: (!) 144/83  MAP (Calculated): 103  MAP (mmHg): 87  BP Location: Right upper arm  BP Upper/Lower: Upper  BP Method: Automatic  Patient Position: Semi fowlers  Cardiac Rhythm: Atrial fib (111)  Pain Assessment  Pain Assessment: None - Denies Pain  Pain Level: 0  Patient's Stated Pain Goal: 0 - No pain  Pain Location: Generalized  Pain Orientation: Mid, Lower  Pain Descriptors: Dull, Aching  Functional Pain Assessment: Activities are not prevented  Pain Type: Acute pain  Pain Frequency: Intermittent  Pain Onset: On-going  Non-Pharmaceutical Pain Intervention(s): Emotional support, Repositioned, Rest          Addison Scale: Activity: Able to move four extremities voluntarily or on command  Respiration: Able to breathe and cough freely  Circulation: Blood pressure within 20% of preanesthesia level  Consciousness: Fully awake  Oxygen: SAO2 > 92% on room air   Addison Score: 10     Activity:  2 - Able to move 4 extremities  voluntarily on command  Respiration:  2 - Able to breathe deeply and cough freely  Circulation:  2 - BP+/- 20mmHg of normal  Consciousness:  2 - Fully awake  Oxygen Saturation (color):  1 - Needs oxygen to maintain oxygen saturation >90%      Prior to Admission medications    Medication Sig Start Date End Date Taking? Authorizing Provider   pravastatin (PRAVACHOL) 40 MG tablet TAKE 1 TABLET NIGHTLY 12/16/24  Yes Bing Lester APRN - CNP   celecoxib (CELEBREX) 200 MG capsule TAKE 1 CAPSULE DAILY 12/16/24  Yes Bing Lester APRN - CNP   busPIRone (BUSPAR) 5 MG tablet Take 1 tablet by mouth 3 times daily 10/30/24  Yes Bing Lester APRN - CNP   levothyroxine (SYNTHROID) 50 MCG tablet TAKE 1 TABLET ON MONDAY, WEDNESDAY, AND FRIDAY ALTERNATING WITH THE 75 MCG 10/1/24  Yes Bing Lester APRN - CNP   levothyroxine (SYNTHROID) 75 MCG tablet TAKE 1 TABLET ON SUNDAY, TUESDAY, THURSDAY AND SATURDAY 10/1/24  Yes Bing Lester APRN - CNP   metoprolol succinate (TOPROL XL) 50 MG extended release tablet TAKE 1 TABLET TWICE A DAY 4/5/24  Yes Daniel Chadwick MD   Multiple Vitamins-Minerals (PRESERVISION AREDS PO) Take by mouth   Yes Provider, Historical, MD   Handicap Placard MISC by Does not apply route Expires 5 years from date 4/11/22  Yes Dieudonne Breaux MD   Respiratory Therapy Supplies RISSA New CPAP mask and supplies 5/13/21  Yes Aguilar Garber MD   CPAP Machine MISC by Does not apply route Auto CPAP 5-10 cm of H2O 10/29/19  Yes Aguilar Garber MD   calcium carbonate 600 MG TABS tablet Take 1 tablet by mouth daily   Yes Stephania Vilchis MD   vitamin D (CHOLECALCIFEROL) 1000 UNIT TABS tablet Take 1 tablet by mouth daily   Yes Stephania Vilchis MD        Electronically signed by Beverly Matt RN on 1/12/2025 at 9:36 AM

## 2025-01-12 NOTE — PROGRESS NOTES
Hospitalist Daily Progress Note  Name: Megan Garcia  Age: 84 y.o.  Gender: female  CodeStatus: Full Code  Allergies: No Known Allergies    Chief Complaint:No chief complaint on file.      Primary Care Provider: Bing Lester APRN - CNP    InpatientTreatment Team: Treatment Team:   Jluis Cote MD Holiday, Wes J, Baron Linares, Brad Busby RN Shrader, Kasia Moore, Ariadna Sanchez RN Miller, Rhonda A, RN Ancog, Meliza Canoy    Admission Date: 1/10/2025      Subjective: No chest pain, nausea.  SOB moderate.  Patient anxious.    Physical Exam  Vitals and nursing note reviewed.   Constitutional:       Appearance: Normal appearance.   Cardiovascular:      Rate and Rhythm: Normal rate and regular rhythm.   Pulmonary:      Effort: Pulmonary effort is normal.      Breath sounds: Normal breath sounds.   Abdominal:      General: Bowel sounds are normal.      Palpations: Abdomen is soft.   Musculoskeletal:         General: Normal range of motion.   Skin:     General: Skin is warm and dry.   Neurological:      General: No focal deficit present.      Mental Status: She is alert. Mental status is at baseline.         Medications:  Reviewed    Infusion Medications:    sodium chloride       Scheduled Medications:    predniSONE  50 mg Oral Once    diphenhydrAMINE  50 mg Oral Once    hydrocortisone sodium succinate PF  200 mg IntraVENous Once    apixaban  10 mg Oral BID    Followed by    [START ON 1/19/2025] apixaban  5 mg Oral BID    busPIRone  5 mg Oral TID    calcium carbonate  1 tablet Oral Daily    levothyroxine  50 mcg Oral See Admin Instructions    levothyroxine  75 mcg Oral See Admin Instructions    metoprolol succinate  50 mg Oral BID    pravastatin  40 mg Oral Nightly    Vitamin D  1,000 Units Oral Daily    therapeutic multivitamin-minerals  1 tablet Oral Daily with breakfast    furosemide  20 mg Oral Daily    sodium chloride flush  5-40 mL IntraVENous 2 times per day     PRN Meds:  ondansetron, sodium chloride flush, sodium chloride, ondansetron **OR** ondansetron, polyethylene glycol, acetaminophen **OR** acetaminophen    Labs:   Recent Labs     01/10/25  1238 01/11/25  0550 01/12/25  0626   WBC 7.8 6.0 6.4   HGB 15.1 14.8 15.0   HCT 44.3 44.4 43.6    242 250     Recent Labs     01/10/25  1238 01/11/25  0550 01/12/25  0625    138 140   K 3.7 3.7 3.9    103 104   CO2 26 26 25   BUN 13 12 15   CREATININE 0.70 0.72 0.71   CALCIUM 9.7 8.8 9.0     Recent Labs     01/10/25  1238   AST 25   ALT 16   BILITOT 0.8*   ALKPHOS 164*     Recent Labs     01/10/25  1449   INR 1.1     No results for input(s): \"CKTOTAL\", \"TROPONINI\" in the last 72 hours.    Urinalysis:   Lab Results   Component Value Date/Time    NITRU Negative 10/13/2017 02:30 PM    WBCUA 3-5 10/13/2017 02:30 PM    BACTERIA Few 10/13/2017 02:30 PM    RBCUA 0-2 10/13/2017 02:30 PM    BLOODU + 07/29/2022 10:39 AM    BLOODU Negative 10/13/2017 02:30 PM    SPECGRAV 1.015 07/29/2022 10:39 AM    GLUCOSEU - 07/29/2022 10:39 AM    GLUCOSEU Negative 10/13/2017 02:30 PM       Radiology:   Most recent    Chest CT      WITH CONTRAST:No results found for this or any previous visit.       WITHOUT CONTRAST: No results found for this or any previous visit.      CXR      2-view: Results for orders placed during the hospital encounter of 01/10/25    XR CHEST (2 VW)    Narrative  EXAMINATION:  TWO XRAY VIEWS OF THE CHEST    1/10/2025 12:42 pm    COMPARISON:  October 13, 2017    HISTORY:  ORDERING SYSTEM PROVIDED HISTORY: SOB  TECHNOLOGIST PROVIDED HISTORY:  Reason for exam:->SOB  What reading provider will be dictating this exam?->CRC    FINDINGS:  There is coarsening of the interstitium.  Bilateral small pleural effusions  are seen.  The cardiac silhouette is mildly enlarged.  Degenerative changes  are seen in the spine as well as osteopenia.    Impression  Mild CHF; superimposed infiltrate not excluded.       Portable: Results for orders

## 2025-01-12 NOTE — INTERVAL H&P NOTE
Update History & Physical    The patient's History and Physical of January 11, 25 was reviewed with the patient and I examined the patient. There was no change. The surgical site was confirmed by the patient and me.     Plan: The risks, benefits, expected outcome, and alternative to the recommended procedure have been discussed with the patient. Patient understands and wants to proceed with the procedure.     Electronically signed by Baron Mares DO on 1/12/2025 at 8:57 AM

## 2025-01-12 NOTE — BRIEF OP NOTE
Section of Cardiology  Adult Brief  angio Procedure Note        Procedure(s): Bilateral pulmonary angiogram.  Mechanical PE thrombectomy    Pre-operative Diagnosis: Saddle pulmonary embolism with evidence of RV strain    H&P Status: Completed and reviewed.     Post-operative Diagnosis:      Bilateral pulm and angiogram shows thrombus in the left main as well as segmental subsegmental branches.  Thrombus noted in the subsegmental right pulmonary artery.    Normal PA pressures before and after PE thrombectomy    Findings:  See full report    Complications:  none    Primary Proceduralist:   Dr. Baron Mares DO    Plan:  DC heparin drip.  Initiate Eliquis full dose oral anticoagulation  Monitor H&H.  Monitor on telemetry    Full procedure note to follow

## 2025-01-13 LAB
ANION GAP SERPL CALCULATED.3IONS-SCNC: 10 MEQ/L (ref 9–15)
BUN SERPL-MCNC: 15 MG/DL (ref 8–23)
CALCIUM SERPL-MCNC: 8.9 MG/DL (ref 8.5–9.9)
CHLORIDE SERPL-SCNC: 105 MEQ/L (ref 95–107)
CO2 SERPL-SCNC: 23 MEQ/L (ref 20–31)
CREAT SERPL-MCNC: 0.75 MG/DL (ref 0.5–0.9)
GLUCOSE SERPL-MCNC: 92 MG/DL (ref 70–99)
POTASSIUM SERPL-SCNC: 4.1 MEQ/L (ref 3.4–4.9)
SODIUM SERPL-SCNC: 138 MEQ/L (ref 135–144)

## 2025-01-13 PROCEDURE — 2060000000 HC ICU INTERMEDIATE R&B

## 2025-01-13 PROCEDURE — 2700000000 HC OXYGEN THERAPY PER DAY

## 2025-01-13 PROCEDURE — 80048 BASIC METABOLIC PNL TOTAL CA: CPT

## 2025-01-13 PROCEDURE — 2500000003 HC RX 250 WO HCPCS: Performed by: REGISTERED NURSE

## 2025-01-13 PROCEDURE — 99233 SBSQ HOSP IP/OBS HIGH 50: CPT | Performed by: INTERNAL MEDICINE

## 2025-01-13 PROCEDURE — 6370000000 HC RX 637 (ALT 250 FOR IP): Performed by: FAMILY MEDICINE

## 2025-01-13 PROCEDURE — APPSS30 APP SPLIT SHARED TIME 16-30 MINUTES: Performed by: PHYSICIAN ASSISTANT

## 2025-01-13 PROCEDURE — 6370000000 HC RX 637 (ALT 250 FOR IP): Performed by: PHYSICIAN ASSISTANT

## 2025-01-13 PROCEDURE — 6370000000 HC RX 637 (ALT 250 FOR IP): Performed by: INTERNAL MEDICINE

## 2025-01-13 PROCEDURE — 36415 COLL VENOUS BLD VENIPUNCTURE: CPT

## 2025-01-13 RX ORDER — DILTIAZEM HYDROCHLORIDE 30 MG/1
30 TABLET, FILM COATED ORAL EVERY 6 HOURS SCHEDULED
Status: DISCONTINUED | OUTPATIENT
Start: 2025-01-13 | End: 2025-01-14 | Stop reason: HOSPADM

## 2025-01-13 RX ADMIN — SODIUM CHLORIDE, PRESERVATIVE FREE 10 ML: 5 INJECTION INTRAVENOUS at 09:58

## 2025-01-13 RX ADMIN — PRAVASTATIN SODIUM 40 MG: 40 TABLET ORAL at 20:26

## 2025-01-13 RX ADMIN — BUSPIRONE HYDROCHLORIDE 5 MG: 10 TABLET ORAL at 20:27

## 2025-01-13 RX ADMIN — METOPROLOL SUCCINATE 50 MG: 50 TABLET, EXTENDED RELEASE ORAL at 09:58

## 2025-01-13 RX ADMIN — ANTACID TABLETS 500 MG: 500 TABLET, CHEWABLE ORAL at 09:57

## 2025-01-13 RX ADMIN — APIXABAN 10 MG: 5 TABLET, FILM COATED ORAL at 09:58

## 2025-01-13 RX ADMIN — METOPROLOL SUCCINATE 50 MG: 50 TABLET, EXTENDED RELEASE ORAL at 20:26

## 2025-01-13 RX ADMIN — FUROSEMIDE 20 MG: 20 TABLET ORAL at 09:58

## 2025-01-13 RX ADMIN — SODIUM CHLORIDE, PRESERVATIVE FREE 10 ML: 5 INJECTION INTRAVENOUS at 20:36

## 2025-01-13 RX ADMIN — LEVOTHYROXINE SODIUM 75 MCG: 0.05 TABLET ORAL at 09:57

## 2025-01-13 RX ADMIN — Medication 1000 UNITS: at 09:58

## 2025-01-13 RX ADMIN — BUSPIRONE HYDROCHLORIDE 5 MG: 10 TABLET ORAL at 09:57

## 2025-01-13 RX ADMIN — BUSPIRONE HYDROCHLORIDE 5 MG: 10 TABLET ORAL at 13:32

## 2025-01-13 RX ADMIN — APIXABAN 10 MG: 5 TABLET, FILM COATED ORAL at 20:27

## 2025-01-13 RX ADMIN — Medication 1 TABLET: at 09:57

## 2025-01-13 RX ADMIN — DILTIAZEM HYDROCHLORIDE 30 MG: 30 TABLET, FILM COATED ORAL at 17:42

## 2025-01-13 RX ADMIN — DILTIAZEM HYDROCHLORIDE 30 MG: 30 TABLET, FILM COATED ORAL at 13:32

## 2025-01-13 ASSESSMENT — ENCOUNTER SYMPTOMS
COLOR CHANGE: 0
ABDOMINAL DISTENTION: 0
NAUSEA: 0
VOMITING: 0
SHORTNESS OF BREATH: 1

## 2025-01-13 ASSESSMENT — PAIN SCALES - GENERAL: PAINLEVEL_OUTOF10: 0

## 2025-01-13 NOTE — CARE COORDINATION
DC PLAN REMAINS HOME, DENIED NEEDS. IMM REVIEWED WITH PATIENT AND VERBALIZED UNDERSTANDING. PT MAY NEED HOME 02 EVAL PRIOR TO DC. ELIQUIS CARD ON CHART.

## 2025-01-13 NOTE — PROGRESS NOTES
Hospitalist Progress Note      PCP: Bing Lester, APRN - CNP    Date of Admission: 1/10/2025    Chief Complaint:  no acute events, s/p pulmonary angiogram and mechanical thrombectomy yesterday per interventional cardiology, is in AF with HR in the 90-110s on telemetry, on RA    Medications:  Reviewed    Infusion Medications    sodium chloride       Scheduled Medications    predniSONE  50 mg Oral Once    diphenhydrAMINE  50 mg Oral Once    hydrocortisone sodium succinate PF  200 mg IntraVENous Once    apixaban  10 mg Oral BID    Followed by    [START ON 1/19/2025] apixaban  5 mg Oral BID    busPIRone  5 mg Oral TID    calcium carbonate  1 tablet Oral Daily    levothyroxine  50 mcg Oral See Admin Instructions    levothyroxine  75 mcg Oral See Admin Instructions    metoprolol succinate  50 mg Oral BID    pravastatin  40 mg Oral Nightly    Vitamin D  1,000 Units Oral Daily    therapeutic multivitamin-minerals  1 tablet Oral Daily with breakfast    furosemide  20 mg Oral Daily    sodium chloride flush  5-40 mL IntraVENous 2 times per day     PRN Meds: ondansetron, diphenhydrAMINE, sodium chloride flush, sodium chloride, ondansetron **OR** ondansetron, polyethylene glycol, acetaminophen **OR** acetaminophen      Intake/Output Summary (Last 24 hours) at 1/13/2025 1308  Last data filed at 1/12/2025 1825  Gross per 24 hour   Intake 720 ml   Output --   Net 720 ml       Exam:    /74   Pulse 89   Temp 98.3 °F (36.8 °C) (Oral)   Resp 18   Ht 1.575 m (5' 2\")   Wt 90.7 kg (200 lb)   LMP  (LMP Unknown)   SpO2 93%   BMI 36.58 kg/m²     General appearance: appears stated age and cooperative.  Respiratory:  clear to auscultation, bilaterally   Cardiovascular: irregular, tachycardic.  Abdomen: Soft.  Musculoskeletal: No edema bilaterally.      Labs:   Recent Labs     01/11/25  0550 01/12/25  0626   WBC 6.0 6.4   HGB 14.8 15.0   HCT 44.4 43.6    250     Recent Labs     01/11/25  0550 01/12/25  0625

## 2025-01-13 NOTE — FLOWSHEET NOTE
Pt is in bed watching Tv. She is A/O/ x 4, BR, 1 Assist to BSC. She takes her po medications whole w/ water. Last BM on 01/11/2025, no c/o constipation, abd pain, or diarrhea. Bilat. Lungs are clr dim, POX on 2 L at 93% via NC, no SOB or difficulties breathing. Bilat UE/LE pulses are palpable at +2, occasional cough. She has a 20 ga IV in her Left forearm that is clean, dry, and intact. Right groin dsg is clean, dry, and intact, (+) sensation, pedal pulse is palpable at +2, trace  to +1 edema noted. No c/o pain at this time. Call light is w/in reach.

## 2025-01-13 NOTE — PROGRESS NOTES
01/13/2025 06:10 AM    K 3.7 01/11/2025 05:50 AM     01/13/2025 06:10 AM    CO2 23 01/13/2025 06:10 AM    BUN 15 01/13/2025 06:10 AM    CREATININE 0.75 01/13/2025 06:10 AM    CALCIUM 8.9 01/13/2025 06:10 AM    GFRAA >60.0 03/18/2022 10:21 AM    LABGLOM 78.1 01/13/2025 06:10 AM    LABGLOM >60.0 06/07/2023 09:20 AM    GLUCOSE 92 01/13/2025 06:10 AM     Magnesium:    Lab Results   Component Value Date/Time    MG 1.7 01/10/2025 12:38 PM     Troponin:    Lab Results   Component Value Date/Time    TROPONINI <0.010 10/14/2017 03:05 AM       Radiology:  No results found.     Echocardiogram 1/11/25:   Left Ventricle Normal left ventricular systolic function with a visually estimated EF of 55 - 60%. Left ventricle size is normal. Mildly increased wall thickness. Septal flattening in systole consistent with right ventricular pressure overload. Normal wall motion. Diastolic dysfunction present with normal LV EF.   Left Atrium Left atrium size is normal.   Right Ventricle Right ventricle size is normal. Normal systolic function. Findings consistent with Sin's sign.   Right Atrium Right atrium size is normal.   Aortic Valve Trileaflet valve. Thickened cusps. Mild to moderate regurgitation. No stenosis.   Mitral Valve Thickened leaflets. Trace regurgitation. No stenosis noted.   Tricuspid Valve Valve structure is normal. Mild regurgitation. No stenosis noted. Normal RVSP.   Pulmonic Valve The pulmonic valve visualization is suboptimal but appears to be functioning normally. Physiologically normal regurgitation. No stenosis noted.   Aorta Normal sized aortic root and ascending aorta.   IVC/Hepatic Veins IVC diameter is less than or equal to 21 mm and decreases greater than 50% during inspiration; therefore the estimated right atrial pressure is normal (~3 mmHg). IVC size is normal.   Pericardium No pericardial effusion.       1/12/25: Procedure(s): Bilateral pulmonary angiogram.  Mechanical PE  thrombectomy  Pre-operative Diagnosis: Saddle pulmonary embolism with evidence of RV strain  H&P Status: Completed and reviewed.      Post-operative Diagnosis:     Bilateral pulm and angiogram shows thrombus in the left main as well as segmental subsegmental branches.  Thrombus noted in the subsegmental right pulmonary artery.     Normal PA pressures before and after PE thrombectomy     Findings:  See full report  Complications:  none  Primary Proceduralist:   Dr. Baron Mares    DO     Plan:  DC heparin drip.  Initiate Eliquis full dose oral anticoagulation  Monitor H&H.  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 Strong     Plan:  Continue current medications-Eliquis per DVT/PE protocol, Toprol-XL 50 mg p.o. twice daily, add cardizem 30mg PO Q 6H, Lasix 20 mg p.o. daily, pravastatin 40 mg p.o. nightly, levothyroxine 75 mcg on Tuesday/Thursday/Saturday/Sunday and 50 mcg on Monday/Wednesday/Friday, BuSpar 5 mg p.o. 3 times daily  Continue to titrate rate control medications as needed  Consider addition of ACE inhibitor/ARB in future if BP tolerates given new diagnosis of CHF  Cardiac/less than 2 g sodium diet recommended  Monitor on telemetry for any tachycardia or bradycardia arrhythmias  Maintain potassium greater than 4, magnesium greater than 2  GI/DVT prophylaxis  Hospitalist recommendations  Patient already follows with hematology, Dr. Strong regarding history of polycythemia.  Recommend f/u in near future regarding acute PE--per pt and daughter, pt has fllow-up scheduled on Thursday  Consider

## 2025-01-14 VITALS
RESPIRATION RATE: 18 BRPM | WEIGHT: 200 LBS | DIASTOLIC BLOOD PRESSURE: 77 MMHG | TEMPERATURE: 97.9 F | OXYGEN SATURATION: 95 % | SYSTOLIC BLOOD PRESSURE: 110 MMHG | HEART RATE: 83 BPM | BODY MASS INDEX: 36.8 KG/M2 | HEIGHT: 62 IN

## 2025-01-14 LAB
ALBUMIN SERPL-MCNC: 3.2 G/DL (ref 3.5–4.6)
ANION GAP SERPL CALCULATED.3IONS-SCNC: 10 MEQ/L (ref 9–15)
BUN SERPL-MCNC: 18 MG/DL (ref 8–23)
CALCIUM SERPL-MCNC: 8.9 MG/DL (ref 8.5–9.9)
CHLORIDE SERPL-SCNC: 105 MEQ/L (ref 95–107)
CO2 SERPL-SCNC: 28 MEQ/L (ref 20–31)
CREAT SERPL-MCNC: 0.9 MG/DL (ref 0.5–0.9)
ERYTHROCYTE [DISTWIDTH] IN BLOOD BY AUTOMATED COUNT: 13.2 % (ref 11.5–14.5)
GLUCOSE SERPL-MCNC: 95 MG/DL (ref 70–99)
HCT VFR BLD AUTO: 42.1 % (ref 37–47)
HGB BLD-MCNC: 14 G/DL (ref 12–16)
MAGNESIUM SERPL-MCNC: 1.8 MG/DL (ref 1.7–2.4)
MCH RBC QN AUTO: 29.6 PG (ref 27–31.3)
MCHC RBC AUTO-ENTMCNC: 33.3 % (ref 33–37)
MCV RBC AUTO: 89 FL (ref 79.4–94.8)
PHOSPHATE SERPL-MCNC: 3.3 MG/DL (ref 2.3–4.8)
PLATELET # BLD AUTO: 271 K/UL (ref 130–400)
POC ACT LR: 299 SEC
POTASSIUM SERPL-SCNC: 4 MEQ/L (ref 3.4–4.9)
RBC # BLD AUTO: 4.73 M/UL (ref 4.2–5.4)
SODIUM SERPL-SCNC: 143 MEQ/L (ref 135–144)
WBC # BLD AUTO: 5.8 K/UL (ref 4.8–10.8)

## 2025-01-14 PROCEDURE — 6370000000 HC RX 637 (ALT 250 FOR IP): Performed by: REGISTERED NURSE

## 2025-01-14 PROCEDURE — 99232 SBSQ HOSP IP/OBS MODERATE 35: CPT | Performed by: INTERNAL MEDICINE

## 2025-01-14 PROCEDURE — 6370000000 HC RX 637 (ALT 250 FOR IP): Performed by: FAMILY MEDICINE

## 2025-01-14 PROCEDURE — 2700000000 HC OXYGEN THERAPY PER DAY

## 2025-01-14 PROCEDURE — 85027 COMPLETE CBC AUTOMATED: CPT

## 2025-01-14 PROCEDURE — 2500000003 HC RX 250 WO HCPCS: Performed by: REGISTERED NURSE

## 2025-01-14 PROCEDURE — 83735 ASSAY OF MAGNESIUM: CPT

## 2025-01-14 PROCEDURE — 36415 COLL VENOUS BLD VENIPUNCTURE: CPT

## 2025-01-14 PROCEDURE — 6370000000 HC RX 637 (ALT 250 FOR IP): Performed by: PHYSICIAN ASSISTANT

## 2025-01-14 PROCEDURE — APPSS30 APP SPLIT SHARED TIME 16-30 MINUTES: Performed by: PHYSICIAN ASSISTANT

## 2025-01-14 PROCEDURE — 94761 N-INVAS EAR/PLS OXIMETRY MLT: CPT

## 2025-01-14 PROCEDURE — 6370000000 HC RX 637 (ALT 250 FOR IP): Performed by: INTERNAL MEDICINE

## 2025-01-14 PROCEDURE — 80069 RENAL FUNCTION PANEL: CPT

## 2025-01-14 RX ORDER — FUROSEMIDE 20 MG/1
20 TABLET ORAL DAILY
Qty: 30 TABLET | Refills: 3 | Status: SHIPPED | OUTPATIENT
Start: 2025-01-15 | End: 2025-01-21 | Stop reason: SDUPTHER

## 2025-01-14 RX ORDER — DILTIAZEM HYDROCHLORIDE 120 MG/1
120 CAPSULE, COATED, EXTENDED RELEASE ORAL DAILY
Qty: 30 CAPSULE | Refills: 3 | Status: SHIPPED | OUTPATIENT
Start: 2025-01-15 | End: 2025-01-21 | Stop reason: SDUPTHER

## 2025-01-14 RX ADMIN — DILTIAZEM HYDROCHLORIDE 30 MG: 30 TABLET, FILM COATED ORAL at 06:03

## 2025-01-14 RX ADMIN — SODIUM CHLORIDE, PRESERVATIVE FREE 10 ML: 5 INJECTION INTRAVENOUS at 08:59

## 2025-01-14 RX ADMIN — METOPROLOL SUCCINATE 50 MG: 50 TABLET, EXTENDED RELEASE ORAL at 08:58

## 2025-01-14 RX ADMIN — Medication 1000 UNITS: at 08:58

## 2025-01-14 RX ADMIN — DILTIAZEM HYDROCHLORIDE 30 MG: 30 TABLET, FILM COATED ORAL at 12:38

## 2025-01-14 RX ADMIN — FUROSEMIDE 20 MG: 20 TABLET ORAL at 08:58

## 2025-01-14 RX ADMIN — ACETAMINOPHEN 650 MG: 325 TABLET ORAL at 00:17

## 2025-01-14 RX ADMIN — Medication 1 TABLET: at 08:58

## 2025-01-14 RX ADMIN — DIPHENHYDRAMINE HCL 25 MG: 25 TABLET ORAL at 00:17

## 2025-01-14 RX ADMIN — BUSPIRONE HYDROCHLORIDE 5 MG: 10 TABLET ORAL at 15:26

## 2025-01-14 RX ADMIN — LEVOTHYROXINE SODIUM 75 MCG: 0.05 TABLET ORAL at 08:58

## 2025-01-14 RX ADMIN — APIXABAN 10 MG: 5 TABLET, FILM COATED ORAL at 08:58

## 2025-01-14 RX ADMIN — ANTACID TABLETS 500 MG: 500 TABLET, CHEWABLE ORAL at 08:58

## 2025-01-14 RX ADMIN — DILTIAZEM HYDROCHLORIDE 30 MG: 30 TABLET, FILM COATED ORAL at 00:17

## 2025-01-14 RX ADMIN — BUSPIRONE HYDROCHLORIDE 5 MG: 10 TABLET ORAL at 08:58

## 2025-01-14 ASSESSMENT — PAIN SCALES - GENERAL
PAINLEVEL_OUTOF10: 0
PAINLEVEL_OUTOF10: 3

## 2025-01-14 ASSESSMENT — ENCOUNTER SYMPTOMS
VOMITING: 0
ABDOMINAL DISTENTION: 0
COLOR CHANGE: 0
SHORTNESS OF BREATH: 0
NAUSEA: 0

## 2025-01-14 ASSESSMENT — PAIN DESCRIPTION - ORIENTATION: ORIENTATION: MID

## 2025-01-14 ASSESSMENT — PAIN DESCRIPTION - LOCATION: LOCATION: BACK

## 2025-01-14 ASSESSMENT — PAIN DESCRIPTION - DESCRIPTORS: DESCRIPTORS: ACHING;DISCOMFORT

## 2025-01-14 NOTE — FLOWSHEET NOTE
Follow up with Dr. Chadwick in 1 week included on paperwork. Eliquis card given to patient. Nurse reviewed discharge paperwork with patient. Outpatient pharmacy delivered all medications to patient room. Patient states they have no further questions regarding discharge plan. IV and heart monitor removed. Transport called..Electronically signed by Tabatha Lanier RN on 1/14/2025 at 3:21 PM

## 2025-01-14 NOTE — PROGRESS NOTES
01/14/25 1252   Resting (Room Air)   SpO2 95   HR 82   During Walk (Room Air)   SpO2 91      Walk/Assistance Device Walker   Rate of Dyspnea 0   After Walk   SpO2 92      Rate of Dyspnea 1   Symptoms Shortness of breath;Fatigue   Comments Patient states they are slightly short of breathe after test.   Does the Patient Qualify for Home O2 No

## 2025-01-14 NOTE — DISCHARGE SUMMARY
Hospital Medicine Discharge Summary    Megan Garcia  :  1940  MRN:  [id number]    Admit date:  1/10/2025  Discharge date:  2025    Admitting Physician:  Jluis Cote MD  Primary Care Physician:  Bing Lester, APRN - CNP      Discharge Diagnoses:    Principal Problem:    Saddle embolus of pulmonary artery without acute cor pulmonale (HCC)  Active Problems:    Acute saddle pulmonary embolism with acute cor pulmonale (HCC)  Resolved Problems:    * No resolved hospital problems. *      Hospital Course:   Megan Garcia is a 84 y.o. female that was admitted and treated at [facility] for the following medical issues:     Bilateral PE / pulmonary edema with hypoxia  - requiring 2-3 liters of O2  - treated with IV Heparin infusion  - TTE showed preserved LVEF, DD, Sin's sign per report  - s/p pulmonary angiogram and mechanical thrombectomy on   - switched to Apixaban, started on PO Lasix  - on RA today, did not qualify for home O2  - followed by interventional cardiology     Newly diagnosed AF with RVR  - rate is better controlled  - continued Toprol, started on Cardizem and Apixaban  - followed by cardiology         Patient was seen by the following consultants while admitted to [facility]:   Consults:  IP CONSULT TO CARDIOLOGY    Significant Diagnostic Studies:    Echo (TTE) complete (PRN contrast/bubble/strain/3D)    Result Date: 2025    Left Ventricle: Normal left ventricular systolic function with a visually estimated EF of 55 - 60%. Left ventricle size is normal. Mildly increased wall thickness. Septal flattening in systole consistent with right ventricular pressure overload. Normal wall motion. Diastolic dysfunction present with normal LV EF.   Right Ventricle: Normal systolic function. Findings consistent with Sin's sign.   Aortic Valve: Mild to moderate regurgitation.   Tricuspid Valve: Mild regurgitation. Normal RVSP.    structures is noted, without suspicious lesion.  No significant soft tissue abnormality is identified.     1. Linear nonocclusive saddle embolus, with moderate to large occlusive and partially occlusive emboli in lobar and segmental branches to the bilateral lower lobes.  No evidence of right heart strain. 2. Mild ground-glass mosaic attenuation in the upper lung zones as well as in the bilateral lower lobes with lower lobe smooth interlobular septal thickening. Findings are suggestive of pulmonary edema.  Small to moderate left and small right pleural effusions 3. Moderate densities in the left lower lobe are nonspecific but felt to represent atelectasis These findings were discussed with Dr. Henao at 3:40 p.m.     XR CHEST (2 VW)    Result Date: 1/10/2025  EXAMINATION: TWO XRAY VIEWS OF THE CHEST 1/10/2025 12:42 pm COMPARISON: October 13, 2017 HISTORY: ORDERING SYSTEM PROVIDED HISTORY: SOB TECHNOLOGIST PROVIDED HISTORY: Reason for exam:->SOB What reading provider will be dictating this exam?->CRC FINDINGS: There is coarsening of the interstitium.  Bilateral small pleural effusions are seen.  The cardiac silhouette is mildly enlarged.  Degenerative changes are seen in the spine as well as osteopenia.     Mild CHF; superimposed infiltrate not excluded.       Discharge Medications:         Medication List        START taking these medications      apixaban 5 MG Tabs tablet  Commonly known as: ELIQUIS  Take 2 tablets by mouth 2 times daily for 5 days, THEN 1 tablet 2 times daily.  Start taking on: January 14, 2025     dilTIAZem 120 MG extended release capsule  Commonly known as: Cardizem CD  Take 1 capsule by mouth daily HOLD for SBP<100 or HR<60  Start taking on: January 15, 2025     furosemide 20 MG tablet  Commonly known as: LASIX  Take 1 tablet by mouth daily  Start taking on: January 15, 2025            CONTINUE taking these medications      busPIRone 5 MG tablet  Commonly known as: BUSPAR  Take 1 tablet by mouth

## 2025-01-14 NOTE — DISCHARGE INSTR - DIET

## 2025-01-14 NOTE — FLOWSHEET NOTE
1115: Cardiology NP notified of home medication rec needing completed for discharge.    1230: Respiratory notified of home O2 eval.

## 2025-01-14 NOTE — PROGRESS NOTES
Hospitalist Progress Note      PCP: Bing Lester, APRN - CNP    Date of Admission: 1/10/2025    Chief Complaint:  no acute events, remains in AF with HR in the 60-70s on telemetry, on RA, feels better, wants to go home    Medications:  Reviewed    Infusion Medications    sodium chloride       Scheduled Medications    dilTIAZem  30 mg Oral 4 times per day    predniSONE  50 mg Oral Once    apixaban  10 mg Oral BID    Followed by    [START ON 1/19/2025] apixaban  5 mg Oral BID    busPIRone  5 mg Oral TID    calcium carbonate  1 tablet Oral Daily    levothyroxine  50 mcg Oral See Admin Instructions    levothyroxine  75 mcg Oral See Admin Instructions    metoprolol succinate  50 mg Oral BID    pravastatin  40 mg Oral Nightly    Vitamin D  1,000 Units Oral Daily    therapeutic multivitamin-minerals  1 tablet Oral Daily with breakfast    furosemide  20 mg Oral Daily    sodium chloride flush  5-40 mL IntraVENous 2 times per day     PRN Meds: ondansetron, diphenhydrAMINE, sodium chloride flush, sodium chloride, ondansetron **OR** ondansetron, polyethylene glycol, acetaminophen **OR** acetaminophen      Intake/Output Summary (Last 24 hours) at 1/14/2025 1048  Last data filed at 1/13/2025 1743  Gross per 24 hour   Intake 400 ml   Output 450 ml   Net -50 ml       Exam:    BP (!) 110/57   Pulse 70   Temp 97.9 °F (36.6 °C) (Oral)   Resp 18   Ht 1.575 m (5' 2\")   Wt 90.7 kg (200 lb)   LMP  (LMP Unknown)   SpO2 95%   BMI 36.58 kg/m²     General appearance: appears stated age and cooperative.  Respiratory:  clear to auscultation, bilaterally   Cardiovascular: irregular, S1/S2.  Abdomen: Soft.  Musculoskeletal: No edema bilaterally.      Labs:   Recent Labs     01/12/25  0626 01/14/25  0518   WBC 6.4 5.8   HGB 15.0 14.0   HCT 43.6 42.1    271     Recent Labs     01/12/25  0625 01/13/25  0610 01/14/25  0518    138 143   K 3.9 4.1 4.0    105 105   CO2 25 23 28   BUN 15 15 18   CREATININE 0.71 0.75 0.90    CALCIUM 9.0 8.9 8.9   PHOS  --   --  3.3     No results for input(s): \"AST\", \"ALT\", \"BILIDIR\", \"BILITOT\", \"ALKPHOS\" in the last 72 hours.  No results for input(s): \"INR\" in the last 72 hours.    No results for input(s): \"CKTOTAL\", \"TROPONINI\" in the last 72 hours.    Urinalysis:      Lab Results   Component Value Date/Time    NITRU Negative 10/13/2017 02:30 PM    WBCUA 3-5 10/13/2017 02:30 PM    BACTERIA Few 10/13/2017 02:30 PM    RBCUA 0-2 10/13/2017 02:30 PM    BLOODU + 07/29/2022 10:39 AM    BLOODU Negative 10/13/2017 02:30 PM    SPECGRAV 1.015 07/29/2022 10:39 AM    GLUCOSEU - 07/29/2022 10:39 AM    GLUCOSEU Negative 10/13/2017 02:30 PM       Radiology:  Vascular duplex lower extremity venous bilateral         CTA CHEST W WO CONTRAST PE Eval   Final Result   1. Linear nonocclusive saddle embolus, with moderate to large occlusive and   partially occlusive emboli in lobar and segmental branches to the bilateral   lower lobes.  No evidence of right heart strain.   2. Mild ground-glass mosaic attenuation in the upper lung zones as well as in   the bilateral lower lobes with lower lobe smooth interlobular septal   thickening. Findings are suggestive of pulmonary edema.  Small to moderate   left and small right pleural effusions   3. Moderate densities in the left lower lobe are nonspecific but felt to   represent atelectasis   These findings were discussed with Dr. Henao at 3:40 p.m.         XR CHEST (2 VW)   Final Result   Mild CHF; superimposed infiltrate not excluded.                 Assessment/Plan:    85 y/o female with history of HTN, palpitations, obesity, LAKSHMI on CPAP, dyslipidemia, polycythemia, who presented with:     Bilateral PE / pulmonary edema with hypoxia  - requiring 2-3 liters of O2  - treated with IV Heparin infusion  - TTE showed preserved LVEF, DD, Sin's sign per report  - s/p pulmonary angiogram and mechanical thrombectomy on 1/12  - switched to Apixaban, on PO Lasix  - on RA today, home

## 2025-01-14 NOTE — FLOWSHEET NOTE
Pt is in bed watching Tv. She is A/O/ x 4, BR, 1 Assist/standby. She takes her po medications whole w/ water. Last BM on 01/13/2025, no c/o constipation, abd pain, or diarrhea. Bilat. Lungs are clr dim, POX at 97% on RA, no c/o  SOB or difficulties breathing. Bilat UE/LE pulses are palpable at +2, occasional cough. She has a 20 ga IV in her Left forearm that is clean, dry, and intact. Right groin dsg is clean, dry, and intact, (+) sensation, pedal pulse is palpable at +2, trace  to +1 edema noted. No c/o pain at this time. Call light is w/in reach.    0017 - Pt c/o mid back pain that she rates at 3/10 aching and discomfort. Pt received 2  mg Tylenols. She also requested PRN Benedryl to help her sleep. Will reassess in 1 hr.    0117 - Reassessed 1 hr later, Pt is in bed sleeping.

## 2025-01-14 NOTE — PROGRESS NOTES
Progress Note  Patient: Megan Garcia  Unit/Bed: W183/W183-01  YOB: 1940  MRN: [id number]  Acct: [id number]   Admitting Diagnosis: Saddle embolus of pulmonary artery without acute cor pulmonale (HCC) [I26.92]  Acute saddle pulmonary embolism without acute cor pulmonale (HCC) [I26.92]  Atrial fibrillation, unspecified type (HCC) [I48.91]  Date:  1/10/2025  Hospital Day: 4    Chief Complaint:  Shortness of breath and chest pain    Subjective      11/14/25: Resting comfortably in bed and in no acute distress.  Family members at bedside.  States she ambulated to the bathroom and back today without any complaint of shortness of breath or palpitations.  Still reports some chest pain with inspiration and some discomfort in her back.  Hemodynamically stable.  Most recent blood pressure 110/57.  On telemetry, she is A-fib with heart rate 60s to 80s.  Telemetry alarms reviewed and no further RVR episodes noted since yesterday afternoon around 3:25 PM.  She is currently on Cardizem 30 mg p.o. every 6 hours in addition to Toprol-XL 50 mg p.o. twice daily for rate control.  She remains on Lasix 20 mg p.o. daily due to initial concerns for acute CHF with chest x-ray on 1/10/2025 showing mild CHF as well as findings concerning for pulmonary edema with bilateral pleural effusions on CTA chest from 1/10/2025.    1/13/25: Resting comfortably in bed in no acute distress.  Daughter at bedside.  Shortness of breath much improved.  Has some chest pain with inspiration.   Most recent blood pressure 108/74.    On telemetry, she is A-fib with heart rates currently 110s to 130s range.  Telemetry alarms reviewed showing frequent A-fib RVR with heart rate up into the 140s to 150s range since yesterday afternoon.  A.m. labs reviewed.  Renal function electrolytes stable.  Hemoglobin stable at 15.0.  She is on oral anticoagulation with Eliquis per DVT/PE protocol.      1/12/25: Procedure(s): Bilateral pulmonary angiogram.     Height:           Review of Systems - Respiratory ROS: no cough, shortness of breath, or wheezing  Cardiovascular ROS: no chest pain or dyspnea on exertion  Gastrointestinal ROS: no abdominal pain, change in bowel habits, or black or bloody stools    Pulmonary/Chest: clear to auscultation bilaterally- no wheezes, rales or rhonchi, normal air movement, no respiratory distress  Cardiovascular: normal rate, normal S1 and S2, no gallops, intact distal pulses, and no carotid bruits  Abdomen: soft, non-tender, non-distended, normal bowel sounds, no masses or organomegaly    Active Hospital Problems    Diagnosis Date Noted    Acute saddle pulmonary embolism with acute cor pulmonale (HCC) [I26.02] 01/10/2025     Priority: High    Saddle embolus of pulmonary artery without acute cor pulmonale (HCC) [I26.92] 01/10/2025     Priority: Low        I reviewed and agree with the findings and plan documented in her note .     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 Strong     Plan:  Continue current medications-Eliquis per DVT/PE protocol, Toprol-XL 50 mg p.o. twice daily, cardizem 30mg PO Q 6H--plan to transition to  mg p.o. daily at discharge, Lasix 20 mg p.o. daily, pravastatin 40 mg p.o. nightly.   Consider addition of ACE inhibitor/ARB in future if BP tolerates given new diagnosis of CHF  Consider addition of SGLT2 inhibitor in future to further optimize heart failure management  Cardiac/less than 2 g sodium diet recommended  Monitor on telemetry for any tachycardia or bradycardia arrhythmias  Maintain potassium greater than 4, magnesium greater than 2  GI/DVT prophylaxis  Patient already follows with

## 2025-01-14 NOTE — DISCHARGE INSTRUCTIONS
-Check daily weight every morning and notify cardiology, Dr. Chadwick if gaining more than 3 pounds in 1-2 days    -Check blood pressure twice daily in a.m. and p.m. prior to taking medications and keep log of blood pressure trends to review at follow-up appointment with cardiology, Dr. Chadwick  Notify office if BP running low with  mmHg or below prior to taking medications  Notify office if BP running high with  mmHg or above or if DBP 85 mmHg or above    -Check heart rate with each BP reading and record log of trends to review at follow-up visit. Normal heart rate is 60-100bpm.    -Recommend 2000 mg daily sodium restriction.    -Recommend 2 liter (64 ounces) daily fluid restriction

## 2025-01-14 NOTE — PROGRESS NOTES
CLINICAL PHARMACY NOTE: MEDS TO BEDS    Total # of Prescriptions Filled: 3   The following medications were delivered to the patient:  Diltiazem ER COATED 120mg Cap  Eliquis 5mg Tab  Furosemide 20mg Tab    Additional Documentation:

## 2025-01-15 ENCOUNTER — CARE COORDINATION (OUTPATIENT)
Dept: CARE COORDINATION | Age: 85
End: 2025-01-15

## 2025-01-15 DIAGNOSIS — I26.02 ACUTE SADDLE PULMONARY EMBOLISM WITH ACUTE COR PULMONALE (HCC): Primary | ICD-10-CM

## 2025-01-15 LAB
ECHO BSA: 1.99 M2
ECHO BSA: 1.99 M2

## 2025-01-15 PROCEDURE — 93970 EXTREMITY STUDY: CPT | Performed by: INTERNAL MEDICINE

## 2025-01-15 PROCEDURE — 1111F DSCHRG MED/CURRENT MED MERGE: CPT | Performed by: NURSE PRACTITIONER

## 2025-01-15 NOTE — CARE COORDINATION
RD received referral from Eunice HOUGH:  Needs diet/fluid education for AF and fluid overload. Recommend 2000 mg daily sodium restriction. Recommend 2 liter (64 ounces) daily fluid restriction    RD contacted Megan Garcia regarding Dietitian referral. Pt answered, RD explained reason for call and role in care. Patient declined nutrition assessment/education at this time. Patient prefers handouts in the mail with a follow up call. RD verified address. RD will mail Low Sodium Nutrition Therapy, Sodium Free Flavoring Tips, Fluid Restricted Diet, Sodium Can be Sneaky. RD will outreach in 2-3 weeks to follow up and answer any nutrition related questions at this time.    Marysol Maher RDN, LD  [phone]

## 2025-01-15 NOTE — CARE COORDINATION
Care Transitions Note    Initial Call - Call within 2 business days of discharge: Yes    Patient Current Location:  Home: [address] [place] [address]    Care Transition Nurse contacted the patient by telephone to perform post hospital discharge assessment, verified name and  as identifiers. Provided introduction to self, and explanation of the Care Transition Nurse role.     Patient: Megan Garcia      Patient : 1940   MRN: [id number]      Reason for Admission: 1/10/2025 - 2025 [facility] IP. Bilateral PE s/p Thrombectomy, New AF w/ RVR.   Discharge Date: 25    RURS: Readmission Risk Score: 9.9  End date 2025    Hosp FU PCP  8:15  Cardio/Cho  11:30    START taking:  apixaban (ELIQUIS)  Start taking on: 2025  dilTIAZem (Cardizem CD)  Start taking on: January 15, 2025  furosemide (LASIX)  Start taking on: January 15, 2025    Last Discharge Facility       Date Complaint Diagnosis Description Type Department Provider    1/10/25 Chest pain Acute saddle pulmonary embolism without acute cor pulmonale (HCC) ... ED to Hosp-Admission (Discharged) (ADMITTED) [id number] Irene Couch MD; Luis Antonio Henao...            Was this an external facility discharge? No    Additional needs identified to be addressed with provider   No needs identified             Method of communication with provider: none.    Patients top risk factors for readmission: Bilat PE, AF    Interventions to address risk factors:   -Check daily weight every morning and notify cardiologyDr. Chadwick if gaining more than 3 pounds in 1-2 days. Discharge wt was 200 lbs.  -Check blood pressure twice daily in a.m. and p.m. prior to taking medications and keep log of blood pressure trends to  review at follow-up appointment with cardiologyDr. Chadwick  Notify office if BP running low with  mmHg or below prior to taking medications  Notify office if BP running high with  mmHg or above or

## 2025-01-17 ENCOUNTER — OFFICE VISIT (OUTPATIENT)
Dept: CARDIOLOGY CLINIC | Age: 85
End: 2025-01-17

## 2025-01-17 ENCOUNTER — CARE COORDINATION (OUTPATIENT)
Dept: CARE COORDINATION | Age: 85
End: 2025-01-17

## 2025-01-17 VITALS
SYSTOLIC BLOOD PRESSURE: 118 MMHG | HEART RATE: 93 BPM | OXYGEN SATURATION: 97 % | BODY MASS INDEX: 35.96 KG/M2 | WEIGHT: 196.6 LBS | DIASTOLIC BLOOD PRESSURE: 70 MMHG

## 2025-01-17 DIAGNOSIS — Z00.00 ROUTINE ADULT HEALTH MAINTENANCE: Primary | ICD-10-CM

## 2025-01-17 ASSESSMENT — ENCOUNTER SYMPTOMS
CHEST TIGHTNESS: 0
BLOOD IN STOOL: 0
STRIDOR: 0
NAUSEA: 0
EYES NEGATIVE: 1
WHEEZING: 0
COUGH: 0
GASTROINTESTINAL NEGATIVE: 1

## 2025-01-17 NOTE — PROGRESS NOTES
Subsequent Progress Note  Patient: Megan Garcia  YOB: 1940  MRN: [id number]    Chief Complaint: alejandre  htn palp polycythemia LAKSHMI  Chief Complaint   Patient presents with    Follow-Up from Hospital     Due to SOB and pain 1/10/25  Found blood clot in lung       CV Data:  10/2017 SPECT negative  10/2017 ECHO 60%  4/21 Echo EF 55  1/25 LLE DVT   1/25 CTA Saddle PE   1/25 Echo EF 55-60        Subjective/HPI: no cp +d no falls no bleed eats well.  occ palp     9/27/2019: had 2U Phlebotomy 2 mo ago. This made her feel better. No cp no sob no falls no bleed.     3/6/2020 still getting Phlebotomy  No cp no sob no falls no bleed.      9/11/2020 no cp no sob no falls no bleed. Uses CPAP.     3/11/21 no cp no sob no falls no bleed sleeps welll w CPAP.     9/14/21 recently under stress due to 's demntia. At that time had frequwnt palps but resolved.     4/13/22 occ alejandre occ palps no cp no falls no bleed.      4/20/23 doing well severe arthritis may need Knee surgery no cp no sob no falls no bleed,.    4/22/24 doong well minimal ALEJANDRE no cp no falls no bleed takes.     1/17/25 recent Saddle PE from LLE DVT. No cp no sob no bleed.  Will need 1 yr DOAC per Heme    EKG: AF 75         Past Medical History:   Diagnosis Date    Bladder prolapse, female, acquired     Chest pain 10/14/2017    Degenerative arthritis of cervical spine 06/29/2012    Diverticulosis     ALEJANDRE (dyspnea on exertion) 10/14/2017    ELIZABETH (generalized anxiety disorder)     Generalized osteoarthritis of multiple sites     knees and neck    Heart palpitations 11/13/2017    History of bone density study 09/2014    Hyperlipidemia 06/29/2012    Hypertension 06/29/2012    Hypothyroidism 06/29/2012    Internal hemorrhoid     Obesity     Osteopenia 09/2014    Seborrheic dermatitis of scalp     Sleep apnea        Past Surgical History:   Procedure Laterality Date    APPENDECTOMY      ARTHROSCOPY / ARTHROTOMY KNEE Right 10/14/2016    RIGHT KNEE

## 2025-01-17 NOTE — CARE COORDINATION
Care Transitions Note    Follow Up Call     CTN left HIPAA VM, purpose of call, my contact for subsequent outreach attempt.    Reason for Admission: 1/10/2025 - 1/14/2025 [facility] IP. Bilateral PE s/p Thrombectomy, New AF w/ RVR.       Follow Up Appointment:     Future Appointments         Provider Specialty Dept Phone    1/29/2025 11:30 AM Bing Lester, APRN - CNP Family Medicine [phone]            Eunice Gan RN

## 2025-01-21 RX ORDER — FUROSEMIDE 20 MG/1
20 TABLET ORAL DAILY
Qty: 90 TABLET | Refills: 3 | Status: SHIPPED | OUTPATIENT
Start: 2025-01-21

## 2025-01-21 RX ORDER — DILTIAZEM HYDROCHLORIDE 120 MG/1
120 CAPSULE, COATED, EXTENDED RELEASE ORAL DAILY
Qty: 90 CAPSULE | Refills: 3 | Status: SHIPPED | OUTPATIENT
Start: 2025-01-21

## 2025-01-21 NOTE — TELEPHONE ENCOUNTER
Requesting medication refill. Please approve or deny this request.    Rx requested:  Requested Prescriptions     Pending Prescriptions Disp Refills    furosemide (LASIX) 20 MG tablet 30 tablet 3     Sig: Take 1 tablet by mouth daily    dilTIAZem (CARDIZEM CD) 120 MG extended release capsule 30 capsule 3     Sig: Take 1 capsule by mouth daily HOLD for SBP<100 or HR<60         Last Office Visit:   1/17/2025      Next Visit Date:  Future Appointments   Date Time Provider Department Center   1/29/2025 11:30 AM Bing Lester APRN - CNP [facility]   5/16/2025  2:30 PM Daniel Chadwick MD [facility]               Please approve or deny.

## 2025-01-23 DIAGNOSIS — I50.31 ACUTE HEART FAILURE WITH PRESERVED EJECTION FRACTION (HFPEF) (HCC): ICD-10-CM

## 2025-01-23 LAB
ANION GAP SERPL CALCULATED.3IONS-SCNC: 13 MEQ/L (ref 9–15)
BUN SERPL-MCNC: 20 MG/DL (ref 8–23)
CALCIUM SERPL-MCNC: 9.8 MG/DL (ref 8.5–9.9)
CHLORIDE SERPL-SCNC: 101 MEQ/L (ref 95–107)
CO2 SERPL-SCNC: 27 MEQ/L (ref 20–31)
CREAT SERPL-MCNC: 0.9 MG/DL (ref 0.5–0.9)
GLUCOSE SERPL-MCNC: 96 MG/DL (ref 70–99)
POTASSIUM SERPL-SCNC: 4.3 MEQ/L (ref 3.4–4.9)
SODIUM SERPL-SCNC: 141 MEQ/L (ref 135–144)

## 2025-01-24 ENCOUNTER — CARE COORDINATION (OUTPATIENT)
Dept: CARE COORDINATION | Age: 85
End: 2025-01-24

## 2025-01-24 NOTE — CARE COORDINATION
Care Transitions Note    Follow Up Call     Reason for Admission: 1/10/2025 - 2025 [facility] IP. Bilateral PE s/p Thrombectomy, New AF w/ RVR.     Patient Current Location:  Home: [address] [place] [id number]    Care Transition Nurse contacted the patient by telephone. Verified name and  as identifiers.    Additional needs identified to be addressed with provider   No needs identified                 Method of communication with provider: none.    Care Summary Note: CTN spoke w/ Megan.    Reports no acute chest pain or pleuritic pain w/ deep breath effort. States she occasionally has fleeting \"jabs\" in her right shoulder w/ certain movements. Saw Dr Chadwick  and states EKG shows she is still in AF. Her home HR range 60-70. Faithful wearing her CPAP. No left calf discomfort or swelling concerns.    Has/taking meds as directed and denies any refill needs today.    Assessments:  Care Transitions Subsequent and Final Call    Subsequent and Final Calls  Do you have any ongoing symptoms?: Yes  Patient-reported symptoms: Pain  Have your medications changed?: No  Do you have any questions related to your medications?: No  Do you currently have any active services?: No  Do you have any needs or concerns that I can assist you with?: No  Identified Barriers: Lack of Education  Care Transitions Interventions     Other Services: Declined (Comment: RPM)     Registered Dietician: Completed    Other Interventions:              Follow Up Appointment:     Future Appointments         Provider Specialty Dept Phone    2025 11:30 AM Bing Lester, APRN - CNP Family Medicine [phone]    2025 2:30 PM Daniel Chadwick MD Cardiology [phone]            Care Transition Nurse provided contact information.  Plan for follow-up call in 6-10 days based on severity of symptoms and risk factors.  Plan for next call: CT FU, symptom and HR check.    Eunice Gan RN

## 2025-01-28 ENCOUNTER — OFFICE VISIT (OUTPATIENT)
Dept: FAMILY MEDICINE CLINIC | Age: 85
End: 2025-01-28

## 2025-01-28 VITALS
OXYGEN SATURATION: 96 % | DIASTOLIC BLOOD PRESSURE: 70 MMHG | HEIGHT: 62 IN | HEART RATE: 92 BPM | SYSTOLIC BLOOD PRESSURE: 120 MMHG | WEIGHT: 196 LBS | BODY MASS INDEX: 36.07 KG/M2

## 2025-01-28 DIAGNOSIS — I26.92 ACUTE SADDLE PULMONARY EMBOLISM WITHOUT ACUTE COR PULMONALE (HCC): ICD-10-CM

## 2025-01-28 DIAGNOSIS — Z09 HOSPITAL DISCHARGE FOLLOW-UP: ICD-10-CM

## 2025-01-28 DIAGNOSIS — J44.9 CHRONIC OBSTRUCTIVE PULMONARY DISEASE, UNSPECIFIED COPD TYPE (HCC): ICD-10-CM

## 2025-01-28 DIAGNOSIS — D45 POLYCYTHEMIA VERA (HCC): ICD-10-CM

## 2025-01-28 DIAGNOSIS — L50.0 ALLERGIC URTICARIA: ICD-10-CM

## 2025-01-28 DIAGNOSIS — I48.91 ATRIAL FIBRILLATION, UNSPECIFIED TYPE (HCC): Primary | ICD-10-CM

## 2025-01-28 RX ORDER — METHYLPREDNISOLONE 4 MG/1
TABLET ORAL
Qty: 21 TABLET | Refills: 0 | Status: SHIPPED | OUTPATIENT
Start: 2025-01-28 | End: 2025-02-03

## 2025-01-28 NOTE — PROGRESS NOTES
Post-Discharge Transitional Care  Follow Up      Megan Garcia   YOB: 1940    Date of Office Visit:  1/28/2025  Date of Hospital Admission: 1/10/25  Date of Hospital Discharge: 1/14/25  Risk of hospital readmission (high >=14%. Medium >=10%) :Readmission Risk Score: 9.9      Care management risk score Rising risk (score 2-5) and Complex Care (Scores >=6): No Risk Score On File     Non face to face  following discharge, date last encounter closed (first attempt may have been earlier): 01/15/2025    Call initiated 2 business days of discharge: Yes    ASSESSMENT/PLAN:   Atrial fibrillation, unspecified type (HCC)  Polycythemia vera (HCC)  Chronic obstructive pulmonary disease, unspecified COPD type (HCC)  Hospital discharge follow-up  -     MA DISCHARGE MEDS RECONCILED W/ CURRENT OUTPATIENT MED LIST  Allergic urticaria  -     methylPREDNISolone (MEDROL DOSEPACK) 4 MG tablet; Take by mouth., Disp-21 tablet, R-0Normal  Acute saddle pulmonary embolism without acute cor pulmonale (HCC)    Medrol pack as ordered today.  Will discuss eliquis alternative with Dr. Chadwick given likely cause of allergic reaction. Aware of need to remain on medication until alternative is disucssed with cardiology. Aware of warning signs of anaphylaxis.  Side effects, adverse effects of the medication prescribed today, as well as treatment plan/ rationale and result expectations have been discussed with the patient who expresses understanding and desires to proceed.    Close follow up to evaluate treatment results and for coordination of care.  I have reviewed the patient's medical history in detail and updated the computerized patient record.    As always, patient is advised that if symptoms worsen in any way they will proceed to the nearest emergency room.       Medical Decision Making: high complexity  No follow-ups on file.           Subjective:   HPI:  Follow up of Hospital problems/diagnosis(es): atrial fibrillation,

## 2025-01-30 ENCOUNTER — OFFICE VISIT (OUTPATIENT)
Dept: CARDIOLOGY CLINIC | Age: 85
End: 2025-01-30
Payer: MEDICARE

## 2025-01-30 VITALS
HEART RATE: 100 BPM | DIASTOLIC BLOOD PRESSURE: 84 MMHG | BODY MASS INDEX: 35.52 KG/M2 | SYSTOLIC BLOOD PRESSURE: 138 MMHG | OXYGEN SATURATION: 96 % | WEIGHT: 194.2 LBS

## 2025-01-30 DIAGNOSIS — Z00.00 ROUTINE ADULT HEALTH MAINTENANCE: Primary | ICD-10-CM

## 2025-01-30 PROCEDURE — 3075F SYST BP GE 130 - 139MM HG: CPT | Performed by: INTERNAL MEDICINE

## 2025-01-30 PROCEDURE — 1159F MED LIST DOCD IN RCRD: CPT | Performed by: INTERNAL MEDICINE

## 2025-01-30 PROCEDURE — 99214 OFFICE O/P EST MOD 30 MIN: CPT | Performed by: INTERNAL MEDICINE

## 2025-01-30 PROCEDURE — 93000 ELECTROCARDIOGRAM COMPLETE: CPT | Performed by: INTERNAL MEDICINE

## 2025-01-30 PROCEDURE — 3079F DIAST BP 80-89 MM HG: CPT | Performed by: INTERNAL MEDICINE

## 2025-01-30 PROCEDURE — 1123F ACP DISCUSS/DSCN MKR DOCD: CPT | Performed by: INTERNAL MEDICINE

## 2025-01-30 RX ORDER — METOPROLOL SUCCINATE 100 MG/1
100 TABLET, EXTENDED RELEASE ORAL DAILY
Qty: 180 TABLET | Refills: 3 | Status: SHIPPED | OUTPATIENT
Start: 2025-01-30

## 2025-01-30 RX ORDER — DABIGATRAN ETEXILATE 150 MG/1
150 CAPSULE ORAL 2 TIMES DAILY
Qty: 180 CAPSULE | Refills: 3 | Status: SHIPPED | OUTPATIENT
Start: 2025-01-30

## 2025-01-30 ASSESSMENT — ENCOUNTER SYMPTOMS
BLOOD IN STOOL: 0
CHEST TIGHTNESS: 0
EYES NEGATIVE: 1
WHEEZING: 0
GASTROINTESTINAL NEGATIVE: 1
STRIDOR: 0
NAUSEA: 0
COUGH: 0

## 2025-01-30 NOTE — PROGRESS NOTES
Subsequent Progress Note  Patient: Megan Garcia  YOB: 1940  MRN: [id number]    Chief Complaint: alejandre  htn palp polycythemia LAKSHMI  Chief Complaint   Patient presents with    Medication Reaction     Rash/hives all over   Thinks its eliquis but not sure       CV Data:  10/2017 SPECT negative  10/2017 ECHO 60%  4/21 Echo EF 55  1/25 LLE DVT   1/25 CTA Saddle PE   1/25 Echo EF 55-60   1/25 Right LE DVT /PE - saddle PE  1/25 Echo EF 55-60 Mild to Mod AR        Subjective/HPI: no cp +d no falls no bleed eats well.  occ palp     9/27/2019: had 2U Phlebotomy 2 mo ago. This made her feel better. No cp no sob no falls no bleed.     3/6/2020 still getting Phlebotomy  No cp no sob no falls no bleed.      9/11/2020 no cp no sob no falls no bleed. Uses CPAP.     3/11/21 no cp no sob no falls no bleed sleeps welll w CPAP.     9/14/21 recently under stress due to 's demntia. At that time had frequwnt palps but resolved.     4/13/22 occ aljeandre occ palps no cp no falls no bleed.      4/20/23 doing well severe arthritis may need Knee surgery no cp no sob no falls no bleed,.    4/22/24 doong well minimal ALEJANDRE no cp no falls no bleed takes.     1/17/25 recent Saddle PE from LLE DVT. No cp no sob no bleed.  Will need 1 yr DOAC per Heme    1/30/25 called in to be seen sooner.  She as severe diffuse rash since starting Eliquis and Cardizem.  Unclear which meds is culprit.  No cp no sob no bleed.     EKG: AF 91        Past Medical History:   Diagnosis Date    Bladder prolapse, female, acquired     Chest pain 10/14/2017    Degenerative arthritis of cervical spine 06/29/2012    Diverticulosis     ALEJANDRE (dyspnea on exertion) 10/14/2017    ELIZABETH (generalized anxiety disorder)     Generalized osteoarthritis of multiple sites     knees and neck    Heart palpitations 11/13/2017    History of bone density study 09/2014    Hyperlipidemia 06/29/2012    Hypertension 06/29/2012    Hypothyroidism 06/29/2012    Internal hemorrhoid

## 2025-01-31 ENCOUNTER — CARE COORDINATION (OUTPATIENT)
Dept: CARE COORDINATION | Age: 85
End: 2025-01-31

## 2025-01-31 NOTE — CARE COORDINATION
Care Transitions Note    Follow Up Call     Reason for Admission: 1/10/2025 - 2025 [facility] IP. Bilateral PE s/p Thrombectomy, New AF w/ RVR.     PCP  Attended.  Dr Chadwick  Attended.    Patient Current Location:  Home: [address] [place] [id number]    Care Transition Nurse contacted the patient by telephone. Verified name and  as identifiers.    Additional needs identified to be addressed with provider   No needs identified                 Method of communication with provider: none.    Care Summary Note: CTN spoke w/ Megan.    Pt denies any chest pain or pressure events, palpitations, dizziness, cough, congestion, or SOB.    Had office EKG and still in asymptomatic AF.    Pt developed itching body hives and petechiae rash last Saturday. Eliquis and Cardizem now listed as allergy medications and discontinued. Started Pradaxa and steroid and increased Toprol XL. No current skin peeling. Noting reduction in itching as of today.     Today's /67, HR 81-92, Sats 95% room air and is using her CPAP at night.     Declines RPM again. Feels she can self manage and reports abnorms to her PCP and cardiologist.    Remote Patient Monitoring:  Offered patient enrollment in the Remote Patient Monitoring (RPM) program for in-home monitoring: Declines x2.    Assessments:  Care Transitions Subsequent and Final Call    Subsequent and Final Calls  Do you have any ongoing symptoms?: Yes  Patient-reported symptoms: Rash  Have your medications changed?: Yes  Patient Reports: Stopped Eliquis and Cardizem. Started steroid, pradaxa, and increased toprol xl.  Do you have any questions related to your medications?: No  Do you currently have any active services?: No  Do you have any needs or concerns that I can assist you with?: No  Identified Barriers: Lack of Education  Care Transitions Interventions     Other Services: Declined (Comment: RPM)     Registered Dietician: Completed    Other

## 2025-02-05 ENCOUNTER — CARE COORDINATION (OUTPATIENT)
Dept: CARE COORDINATION | Age: 85
End: 2025-02-05

## 2025-02-05 NOTE — CARE COORDINATION
Contacted Megan Garcia and left voicemail regarding Dietitian follow up. Left call back number and will follow up as appropriate.         Marysol Maher RDN, LD  [phone]

## 2025-02-06 ENCOUNTER — CARE COORDINATION (OUTPATIENT)
Dept: CARE COORDINATION | Age: 85
End: 2025-02-06

## 2025-02-06 NOTE — CARE COORDINATION
Care Transitions Note    Follow Up Call     Reason for Admission: 1/10/2025 - 2025 [facility] IP. Bilateral PE s/p Thrombectomy, New AF w/ RVR.      PCP  Attended. Next  1:30.  Dr Chadwick  Attended. Next  12:45.    Patient Current Location:  Home: [address] [place] [address]    Care Transition Nurse contacted the patient by telephone. Verified name and  as identifiers.    Additional needs identified to be addressed with provider   No needs identified                 Method of communication with provider: none.    Care Summary Note: CTN spoke w/ Megan.    Reports feeling some general fatigue. No acute weakness concerns.    Reports medication induced rash fading and is nearly gone. No itching, peeling, weeping, or open areas. Completed steroid.    Reports yesterday's /66 in AM, 106/68 HS.  Reports today's /66 HR 61-74.    Denies any dizziness, lightheadedness, new weakness, SOB, sleepiness. Reviewed to contact physician for trending  or less, v/u. Appts reviewed and states intention of attending. Wearing CPAP nightly.    Declined RPM last call and agreeable to AC to follow to review BP/HR and any symptom changes forward. Referral routed. CTN s/o.    Remote Patient Monitoring:  Offered patient enrollment in the Remote Patient Monitoring (RPM) program for in-home monitoring: Previous decline. Wants to self check and report concerns to physician.    Assessments:  Care Transitions Subsequent and Final Call    Subsequent and Final Calls  Do you have any ongoing symptoms?: Yes  Patient-reported symptoms: Fatigue  Have your medications changed?: No  Do you have any questions related to your medications?: No  Do you currently have any active services?: No  Do you have any needs or concerns that I can assist you with?: No  Identified Barriers: Lack of Education  Care Transitions Interventions     Other Services: Declined (Comment: Declined RPM. Agreeable 
General Sunscreen Counseling: I recommended a broad-spectrum sunscreen with a sun protection factor (SPF) of 30 or higher.  I explained that SPF 30 sunscreens block approximately 97 percent of the sun's harmful rays.  Sunscreens should be applied at least 10 minutes prior to expected sun exposure and then every 2 hours after that as long as sun exposure continues. If swimming or exercising, sunscreen should be reapplied every 40-80 minutes after getting wet or sweating (depending on the water resistance of the sunscreen).  One ounce, or the equivalent of a shot glass full of sunscreen, is adequate to protect the skin not covered by a bathing suit. I also recommended wearing a wide-brimmed hat, a lip balm with a sunscreen and sunglasses. Sun protective clothing can be used in lieu of sunscreen but must be worn the entire time you are exposed to the sun's rays. I also recommended avoiding sun during the peak UVB hours from around 10am to 3pm.
Detail Level: Zone

## 2025-02-07 ENCOUNTER — CARE COORDINATION (OUTPATIENT)
Dept: CARE COORDINATION | Age: 85
End: 2025-02-07

## 2025-02-07 NOTE — CARE COORDINATION
Contacted Megan Garcia regarding Dietitian follow up. Pt answered, RD explained reason for call and role in care. Patient states she received the handouts in the mail. Patient has no nutrition related questions or concerns at this time. Declined nutrition assessment/education. RD provided contact information and will follow/assist with patient return call.     Marysol Maher RDN, LD  [phone]

## 2025-02-11 ENCOUNTER — CARE COORDINATION (OUTPATIENT)
Dept: CARE COORDINATION | Age: 85
End: 2025-02-11

## 2025-02-11 NOTE — CARE COORDINATION
Ambulatory Care Coordination Note     2025 4:02 PM     Patient Current Location:  Home: [address] [place] [address]     This patient was received as a referral from Care Transition Nurse.    ACM contacted the patient by telephone. Verified name and  with patient as identifiers. Provided introduction to self, and explanation of the ACM role.   Patient accepted care management services at this time.          ACM: Yani Sargent RN     Challenges to be reviewed by the provider   Additional needs identified to be addressed with provider No  none               Method of communication with provider: none.    Utilization: Initial Call - N/A    Care Summary Note: spoke with patient for care coordination enrollment for HTN, PE, Afib, and LAKSHMI  Patient agrees to CC and follow up calls  ACM reviewed medications.  Updated CC protocol, goals, and education  Patient in hospital for PE and Afib 1/10 - .  Patient home and daughter and son in law live with her.    Patient denies any needs and reports she is doing well  Patient states she had a rash from the Eliquis or Cardizem so Dr. Chadwick discontinued both and started Pradaxa and increased Toprol to 100mg BID.  Patient states rash is very faint.  Denies itching or swelling.  Has follow up with PCP tomorrow.    Patient states she is having issues with swallowing the Pradaxa and has occas feeling of being stuck in throat.  Advised patient to tilt chin down towards chest when swallowing and to drink water with and after taking pill.  Voiced understanding.  Also reports slight nausea with medication.  Advised patient to eat crackers or bread with meds.    Patient states she feels like she is still in Afib.  Denies palpitations but reports an occas \"fluttering\" feeling in am.  Patient monitors BP and Pulse and Pulse ox daily.  Does report Pulse is irregular.  ACM reviewed and educated patient on Afib and if in Afib then pulse is not easily readable on the

## 2025-02-12 ENCOUNTER — OFFICE VISIT (OUTPATIENT)
Dept: FAMILY MEDICINE CLINIC | Age: 85
End: 2025-02-12
Payer: MEDICARE

## 2025-02-12 VITALS
SYSTOLIC BLOOD PRESSURE: 112 MMHG | DIASTOLIC BLOOD PRESSURE: 82 MMHG | OXYGEN SATURATION: 97 % | WEIGHT: 194 LBS | HEART RATE: 86 BPM | HEIGHT: 62 IN | BODY MASS INDEX: 35.7 KG/M2

## 2025-02-12 DIAGNOSIS — I48.91 ATRIAL FIBRILLATION, UNSPECIFIED TYPE (HCC): Primary | ICD-10-CM

## 2025-02-12 DIAGNOSIS — L50.0 ALLERGIC URTICARIA: ICD-10-CM

## 2025-02-12 PROCEDURE — 3079F DIAST BP 80-89 MM HG: CPT | Performed by: NURSE PRACTITIONER

## 2025-02-12 PROCEDURE — 1123F ACP DISCUSS/DSCN MKR DOCD: CPT | Performed by: NURSE PRACTITIONER

## 2025-02-12 PROCEDURE — 1159F MED LIST DOCD IN RCRD: CPT | Performed by: NURSE PRACTITIONER

## 2025-02-12 PROCEDURE — 1126F AMNT PAIN NOTED NONE PRSNT: CPT | Performed by: NURSE PRACTITIONER

## 2025-02-12 PROCEDURE — 99214 OFFICE O/P EST MOD 30 MIN: CPT | Performed by: NURSE PRACTITIONER

## 2025-02-12 PROCEDURE — 3074F SYST BP LT 130 MM HG: CPT | Performed by: NURSE PRACTITIONER

## 2025-02-12 ASSESSMENT — ENCOUNTER SYMPTOMS
COLOR CHANGE: 0
EYE PAIN: 0
DIARRHEA: 0
ABDOMINAL PAIN: 0
CONSTIPATION: 0
BACK PAIN: 0
TROUBLE SWALLOWING: 0
SHORTNESS OF BREATH: 0
CHEST TIGHTNESS: 0
COUGH: 0

## 2025-02-12 NOTE — PROGRESS NOTES
Subjective  Chief Complaint   Patient presents with    Rash     2 week follow up rash, has gotten better.       History of Present Illness  The patient is an 84-year-old female who presents today for follow-up on a rash, atrial fibrillation, and blood pressure management.    She was previously evaluated by Dr. Chadwick for a generalized rash, which was attributed to either Eliquis or Cardizem. The rash has significantly improved, with only faint remnants visible on her breast. She reports no associated itching and expresses hope for complete resolution. She has completed her steroid course. A steroid pack was prescribed, and she was advised to consult with Dr. Chadwick immediately. Dr. Chadwick discontinued both medications and initiated treatment with Pradaxa, while also increasing the dosage of metoprolol.    She experiences difficulty swallowing the pradaxa tablets, often resulting in them becoming lodged in her throat. To alleviate this, she ensures the tablets are well-moistened and splits her medication intake before and after meals. Despite these measures, she continues to experience gastrointestinal discomfort. She attempts to take the medication with food, typically yogurt, and maintains adequate hydration. She occasionally experiences mild palpitations, but these are less severe than those experienced during her previous episode of PVCs.    Her blood pressure readings have been consistently in the range of 112/82, with occasional systolic readings as low as 99. She monitors her blood pressure twice daily, once in the morning and once at night before taking her medication. Her nighttime readings are typically in the 100s. She has not experienced any episodes of lightheadedness or dizziness but reports occasional disorientation upon turning. She has declined the use of a home remote monitoring device.    MEDICATIONS  Current: Pradaxa, metoprolol, Lasix  Discontinued: Eliquis, Cardizem      Past Medical History:   Diagnosis

## 2025-02-27 ENCOUNTER — CARE COORDINATION (OUTPATIENT)
Dept: CARE COORDINATION | Age: 85
End: 2025-02-27

## 2025-02-27 NOTE — CARE COORDINATION
Ambulatory Care Coordination Note     2025 2:06 PM     Patient Current Location:  Home: [address] [place], [place] [id number]     ACM contacted the patient by telephone. Verified name and  with patient as identifiers.         ACM: Yani Sargent RN     Challenges to be reviewed by the provider   Additional needs identified to be addressed with provider No  none               Method of communication with provider: none.    Utilization: Patient has not had any utilization since our last call.     Care Summary Note: spoke with patient for care coordination follow up for HTN, Afib, and education  Patient denies any needs and reports that she is doing good for this call  Denies CP or chest pressure.  Denies cough, congestion, sob, or dizziness.  BP stable.  Averages 120s/60/70s.  Occas 130s/70s.  Advised to monitor daily.  Does feel is in Afib.  Feels occas palpitations or fluttering.  Pulse ox 96-99% on room air.    Appt tomorrow with cardiology Dr. Chadwick.    Patient reports she is still having issues swallowing Pradaxa.  States she is making it work.  Reports a stuck or \"null\" feeling of pill in throat.  Patient was advised to keep Pradaxa in original bottle and take out right before taking.  Encouraged to discuss with Dr. Chadwick tomorrow.    Denies itching, bleeding, or bruising.    Denies recent falls.  Reviewed ambulation safety.  Encouraged slow position changes.    Encouraged patient to eat low fat heart healthy diet.  Encouraged to exercise daily.    Weight 189# on home scale.  Encouraged to monitor weight 3 times week.  Denies edema.  Takes Lasix.  Advised to report a gain of 3lbs or more.  Voiced understanding.    Advised to reach out with new or worsening symptoms.      Offered patient enrollment in the Remote Patient Monitoring (RPM) program for in-home monitoring: Yes, but did not enroll at this time: already monitoring with home equipment.     Assessments Completed:   Hypertension - Encounter

## 2025-02-28 ENCOUNTER — OFFICE VISIT (OUTPATIENT)
Dept: CARDIOLOGY CLINIC | Age: 85
End: 2025-02-28

## 2025-02-28 VITALS
WEIGHT: 195 LBS | SYSTOLIC BLOOD PRESSURE: 136 MMHG | DIASTOLIC BLOOD PRESSURE: 84 MMHG | OXYGEN SATURATION: 96 % | BODY MASS INDEX: 35.67 KG/M2 | HEART RATE: 88 BPM

## 2025-02-28 DIAGNOSIS — E78.5 DYSLIPIDEMIA: ICD-10-CM

## 2025-02-28 DIAGNOSIS — I26.92 ACUTE SADDLE PULMONARY EMBOLISM WITHOUT ACUTE COR PULMONALE (HCC): ICD-10-CM

## 2025-02-28 DIAGNOSIS — G47.30 SLEEP APNEA, UNSPECIFIED TYPE: ICD-10-CM

## 2025-02-28 DIAGNOSIS — I10 ESSENTIAL HYPERTENSION: Primary | ICD-10-CM

## 2025-02-28 ASSESSMENT — ENCOUNTER SYMPTOMS
NAUSEA: 0
STRIDOR: 0
COUGH: 0
EYES NEGATIVE: 1
BLOOD IN STOOL: 0
GASTROINTESTINAL NEGATIVE: 1
CHEST TIGHTNESS: 0
WHEEZING: 0

## 2025-02-28 NOTE — PROGRESS NOTES
Magnesium:    Lab Results   Component Value Date/Time    MG 1.8 01/14/2025 05:18 AM     TSH:  Lab Results   Component Value Date    TSH 3.380 11/20/2024       Patient Active Problem List   Diagnosis    Hypertension    Hyperlipidemia    Hypothyroidism    Degenerative arthritis of cervical spine    Osteoarthritis of knees, bilateral    Seborrheic dermatitis of scalp    Generalized osteoarthritis of multiple sites    ELIZABETH (generalized anxiety disorder)    Elevated hemoglobin    Heart palpitations    Mixed hyperlipidemia    ALEJANDRE (dyspnea on exertion)    Sleep apnea    Obesity (BMI 30-39.9)    Polycythemia    Morbidly obese    Chronic obstructive pulmonary disease, unspecified COPD type (HCC)    Pulmonary hypertension (HCC)    Severe obesity (BMI 35.0-39.9) with comorbidity    Saddle embolus of pulmonary artery without acute cor pulmonale (HCC)    Acute saddle pulmonary embolism with acute cor pulmonale (HCC)    Polycythemia vera (HCC)       There are no discontinued medications.    Modified Medications    No medications on file       No orders of the defined types were placed in this encounter.      Assessment/Plan:    1. Essential hypertension   stable - continue meds. Low salt diet    2. Heart palpitations   stable - occasion. Much improved.      3. Mixed hyperlipidemia   statin - continue meds. Low fat diet.     4. ALEJANDRE (dyspnea on exertion)  Stable.    5.  Polycythemia -  see Hematology - PRN Phlebotomy     6. LAKSHMI- CPAP     7. TR - stable    8. Palps- Continue bid Toprol.     9. LLE DVT and Saddle PE - S/P Thrombectomy - continue Eliquis     10. Petechiae Rash all over body.  No oral mucosa involvement.   Finish steroid dose pack.   DC Diltiazem and Eliquis ( Severe Rash) start Pradaxa.  Advance Toprol to 100 bid from 50 bid.     11. PAD DOAC and rate control     Counseling:  Heart Healthy Lifestyle, Low Salt Diet, Take Precautions to Prevent Falls and Walk Daily    Return in about 4 months (around  none

## 2025-03-11 ENCOUNTER — APPOINTMENT (OUTPATIENT)
Dept: GENERAL RADIOLOGY | Age: 85
End: 2025-03-11
Payer: MEDICARE

## 2025-03-11 ENCOUNTER — HOSPITAL ENCOUNTER (EMERGENCY)
Age: 85
Discharge: HOME | End: 2025-03-11
Attending: STUDENT IN AN ORGANIZED HEALTH CARE EDUCATION/TRAINING PROGRAM
Payer: MEDICARE

## 2025-03-11 VITALS
HEIGHT: 62 IN | OXYGEN SATURATION: 97 % | DIASTOLIC BLOOD PRESSURE: 79 MMHG | RESPIRATION RATE: 13 BRPM | HEART RATE: 94 BPM | BODY MASS INDEX: 35.67 KG/M2 | TEMPERATURE: 97.5 F | SYSTOLIC BLOOD PRESSURE: 113 MMHG

## 2025-03-11 DIAGNOSIS — R07.9 CHEST PAIN, UNSPECIFIED TYPE: Primary | ICD-10-CM

## 2025-03-11 DIAGNOSIS — T50.905A PILL ESOPHAGITIS: ICD-10-CM

## 2025-03-11 DIAGNOSIS — K20.80 PILL ESOPHAGITIS: ICD-10-CM

## 2025-03-11 LAB
ALBUMIN SERPL-MCNC: 4.2 G/DL (ref 3.5–4.6)
ALP SERPL-CCNC: 85 U/L (ref 40–130)
ALT SERPL-CCNC: 12 U/L (ref 0–33)
ANION GAP SERPL CALCULATED.3IONS-SCNC: 12 MEQ/L (ref 9–15)
AST SERPL-CCNC: 22 U/L (ref 0–35)
BASOPHILS # BLD: 0.1 K/UL (ref 0–0.2)
BASOPHILS NFR BLD: 0.7 %
BILIRUB SERPL-MCNC: 1 MG/DL (ref 0.2–0.7)
BUN SERPL-MCNC: 14 MG/DL (ref 8–23)
CALCIUM SERPL-MCNC: 10.1 MG/DL (ref 8.5–9.9)
CHLORIDE SERPL-SCNC: 101 MEQ/L (ref 95–107)
CO2 SERPL-SCNC: 28 MEQ/L (ref 20–31)
CREAT SERPL-MCNC: 0.87 MG/DL (ref 0.5–0.9)
EKG ATRIAL RATE: 54 BPM
EKG Q-T INTERVAL: 298 MS
EKG QRS DURATION: 88 MS
EKG QTC CALCULATION (BAZETT): 403 MS
EKG R AXIS: -30 DEGREES
EKG T AXIS: 162 DEGREES
EKG VENTRICULAR RATE: 110 BPM
EOSINOPHIL # BLD: 0.1 K/UL (ref 0–0.7)
EOSINOPHIL NFR BLD: 0.6 %
ERYTHROCYTE [DISTWIDTH] IN BLOOD BY AUTOMATED COUNT: 14.4 % (ref 11.5–14.5)
GLOBULIN SER CALC-MCNC: 3.5 G/DL (ref 2.3–3.5)
GLUCOSE SERPL-MCNC: 100 MG/DL (ref 70–99)
HCT VFR BLD AUTO: 49.7 % (ref 37–47)
HGB BLD-MCNC: 17.6 G/DL (ref 12–16)
LIPASE SERPL-CCNC: 46 U/L (ref 12–95)
LYMPHOCYTES # BLD: 2.2 K/UL (ref 1–4.8)
LYMPHOCYTES NFR BLD: 25.2 %
MCH RBC QN AUTO: 30.7 PG (ref 27–31.3)
MCHC RBC AUTO-ENTMCNC: 35.4 % (ref 33–37)
MCV RBC AUTO: 86.6 FL (ref 79.4–94.8)
MONOCYTES # BLD: 0.9 K/UL (ref 0.2–0.8)
MONOCYTES NFR BLD: 10.2 %
NEUTROPHILS # BLD: 5.4 K/UL (ref 1.4–6.5)
NEUTS SEG NFR BLD: 63.1 %
PLATELET # BLD AUTO: 245 K/UL (ref 130–400)
POTASSIUM SERPL-SCNC: 3.2 MEQ/L (ref 3.4–4.9)
PROT SERPL-MCNC: 7.7 G/DL (ref 6.3–8)
RBC # BLD AUTO: 5.74 M/UL (ref 4.2–5.4)
SODIUM SERPL-SCNC: 141 MEQ/L (ref 135–144)
TROPONIN, HIGH SENSITIVITY: 10 NG/L (ref 0–19)
TROPONIN, HIGH SENSITIVITY: 9 NG/L (ref 0–19)
WBC # BLD AUTO: 8.5 K/UL (ref 4.8–10.8)

## 2025-03-11 PROCEDURE — 80053 COMPREHEN METABOLIC PANEL: CPT

## 2025-03-11 PROCEDURE — 99285 EMERGENCY DEPT VISIT HI MDM: CPT

## 2025-03-11 PROCEDURE — 85025 COMPLETE CBC W/AUTO DIFF WBC: CPT

## 2025-03-11 PROCEDURE — 6370000000 HC RX 637 (ALT 250 FOR IP): Performed by: STUDENT IN AN ORGANIZED HEALTH CARE EDUCATION/TRAINING PROGRAM

## 2025-03-11 PROCEDURE — 83690 ASSAY OF LIPASE: CPT

## 2025-03-11 PROCEDURE — 93005 ELECTROCARDIOGRAM TRACING: CPT | Performed by: STUDENT IN AN ORGANIZED HEALTH CARE EDUCATION/TRAINING PROGRAM

## 2025-03-11 PROCEDURE — 71045 X-RAY EXAM CHEST 1 VIEW: CPT

## 2025-03-11 PROCEDURE — 36415 COLL VENOUS BLD VENIPUNCTURE: CPT

## 2025-03-11 PROCEDURE — 84484 ASSAY OF TROPONIN QUANT: CPT

## 2025-03-11 RX ORDER — ACETAMINOPHEN 325 MG/1
650 TABLET ORAL ONCE
Status: COMPLETED | OUTPATIENT
Start: 2025-03-11 | End: 2025-03-11

## 2025-03-11 RX ORDER — METOPROLOL SUCCINATE 50 MG/1
100 TABLET, EXTENDED RELEASE ORAL ONCE
Status: COMPLETED | OUTPATIENT
Start: 2025-03-11 | End: 2025-03-11

## 2025-03-11 RX ADMIN — LIDOCAINE HYDROCHLORIDE: 20 SOLUTION ORAL at 10:42

## 2025-03-11 RX ADMIN — ACETAMINOPHEN 650 MG: 325 TABLET ORAL at 12:07

## 2025-03-11 RX ADMIN — METOPROLOL SUCCINATE 100 MG: 50 TABLET, EXTENDED RELEASE ORAL at 10:41

## 2025-03-11 ASSESSMENT — PAIN SCALES - GENERAL
PAINLEVEL_OUTOF10: 0
PAINLEVEL_OUTOF10: 6
PAINLEVEL_OUTOF10: 5

## 2025-03-11 ASSESSMENT — PAIN DESCRIPTION - DESCRIPTORS: DESCRIPTORS: ACHING

## 2025-03-11 ASSESSMENT — PAIN - FUNCTIONAL ASSESSMENT: PAIN_FUNCTIONAL_ASSESSMENT: 0-10

## 2025-03-11 ASSESSMENT — PAIN DESCRIPTION - ORIENTATION: ORIENTATION: MID;RIGHT

## 2025-03-11 ASSESSMENT — PAIN DESCRIPTION - LOCATION
LOCATION: HEAD
LOCATION: CHEST;NECK

## 2025-03-11 NOTE — DISCHARGE INSTRUCTIONS
Continue taking your medications as prescribed    As we discussed try taking your Pradaxa with food and drink plenty of fluids while doing so    Call your cardiologist today for follow-up appointment to discuss possibly switching your medications and to follow up for chest pain    Take your medication as indicated.  For pain use ibuprofen (Motrin / Advil) or acetaminophen (Tylenol), unless prescribed medications that have acetaminophen in it.  You can take over the counter acetaminophen tablets (1 - 2 tablets of the 500-mg strength every 6 hours) or ibuprofen tablets (2 tablets every 4 hours).    If you have not had a stress test in over a year your primary care physician may order this test as further work-up for your chest pain.  If you have a cardiologist, then you should also call them to discuss further treatment options.    PLEASE RETURN TO THE EMERGENCY DEPARTMENT IMMEDIATELY for worsening symptoms of increasing pain, shortness of breath, feeling of your heart fluttering or racing, swelling to your feet, unable to lay flat, or if you develop any concerning symptoms such as: high fever not relieved by acetaminophen (Tylenol) and/or ibuprofen (Motrin / Advil), chills, persistent nausea and/or vomiting, loss of consciousness, numbness, weakness or tingling in the arms or legs or change in color of the extremities, changes in mental status, persistent headache, blurry vision, loss of bladder / bowel control, unable to follow up with your physician, or other any other care or concern.

## 2025-03-11 NOTE — ED TRIAGE NOTES
Pt started to have cp that started last night (mid sternal that radiated to her right neck)  Pt has been burping a lot per daughter   Pt states pain is a 6/10  Pt is afebrile   Pt is A&Ox4  Pt uses a walker at home

## 2025-03-11 NOTE — ED PROVIDER NOTES
[facility] EMERGENCY DEPARTMENT  Emergency Department Encounter  Emergency Medicine      Pt Name: Megan Garcia  MRN:[id number]  Birthdate 1940  Date of evaluation: 3/11/25  Time: 10:08 AM EDT  PCP:  Bing Lester, APRN - CNP    CHIEF COMPLAINT       Chief Complaint   Patient presents with    Chest Pain     Started last night        HISTORY OF PRESENT ILLNESS  (Location/Symptom, Timing/Onset, Context/Setting, Quality, Duration, ModifyingFactors, Severity.)      Megan Garcia is a 84 y.o. female with PMH of A-fib on Pradaxa, saddle embolism, hypertension, presents with chest pain.  Patient says she was feeling well yesterday and early this morning started getting chest pain that woke her up from sleep.  She takes half of a Tylenol PM at night this did not really help.  She describes pressure sensation in the midepigastric and substernal chest region that radiates to her mouth and right side of her neck.  She has not had symptoms like this before.  Denies any cough or sore throat fevers chills lightheadedness nausea vomiting abdominal pain shortness of breath hemoptysis leg swelling or calf pain.  She is compliant with her medications, she takes metoprolol and Pradaxa and has taken both of these regularly.    PAST MEDICAL / SURGICAL / SOCIAL /FAMILY HISTORY      has a past medical history of Bladder prolapse, female, acquired, Chest pain, Degenerative arthritis of cervical spine, Diverticulosis, ALEJANDRE (dyspnea on exertion), ELIZABETH (generalized anxiety disorder), Generalized osteoarthritis of multiple sites, Heart palpitations, History of bone density study, Hyperlipidemia, Hypertension, Hypothyroidism, Internal hemorrhoid, Obesity, Osteopenia, Seborrheic dermatitis of scalp, and Sleep apnea.  No other pertinent PMH on review with patient/guardian.     has a past surgical history that includes Colonoscopy (12/15/2005); bladder suspension (11/2015); Carpal tunnel release (Right); Hysterectomy; Cystocele repair;

## 2025-03-14 ENCOUNTER — CARE COORDINATION (OUTPATIENT)
Dept: CARE COORDINATION | Age: 85
End: 2025-03-14

## 2025-03-14 NOTE — CARE COORDINATION
Ambulatory Care Coordination Note     3/14/2025 3:37 PM     ACM outreach attempt by this ACM today to perform care management follow up . ACM was unable to reach the patient by telephone today;   left voice message requesting a return phone call to this ACM.     ACM: Yani Sargent RN     Care Summary Note: ED follow up    PCP/Specialist follow up:   Future Appointments         Provider Specialty Dept Phone    5/13/2025 2:00 PM Bing Lester, APRN - CNP Family Medicine [phone]    6/13/2025 11:30 AM Daniel Chadwick MD Cardiology [phone]            Follow Up:   Plan for next ACM outreach in approximately 1 week to complete:  - disease specific assessments  - goal progression  - education .

## 2025-03-18 ENCOUNTER — CARE COORDINATION (OUTPATIENT)
Dept: CARE COORDINATION | Age: 85
End: 2025-03-18

## 2025-03-18 NOTE — CARE COORDINATION
Ambulatory Care Coordination Note     3/18/2025 2:01 PM     Patient Current Location:  Home: [address] [place] [address]     ACM contacted the patient by telephone. Verified name and  with patient as identifiers.         ACM: Yani Sargent RN     Challenges to be reviewed by the provider   Additional needs identified to be addressed with provider No  none               Method of communication with provider: none.    Utilization: Patient has not had any utilization since our last call.     Care Summary Note: spoke with patient for care coordination follow up for HTN, Afib, LAKSHMI, and hx PE  Patient states she is doing well for this call.    Denies any needs or issues  Lives with daughter and denies needing in home assistance  Patient reports she is now swallowing Pradaxa with applesauce and has helped with choking feeling, and pill stuck feeling, and chest pain.    Denies chest pain, neck pain, burping, or dysphagia.  Denies cough or congestion.  Denies palpitations.  Does report pulse still sporadic.  Denies fluttering feeling or sob.    BP stable.  136/84, 132/79; pulse fluctuating due to Afib.  Pulse ox 96-99% room air.    Denies worsening sob.  Only with exertion but stable.  Using CPAP at hs.  Denies feeling excessive sleepiness or issues with falling asleep.    Weight stable.  191#.  Denies BLE edema.    Encouraged patient to stay active and exercise daily.  Encouraged heart healthy diet of low fat, low sodium, low carbs.  Encouraged patient to drink water and stay hydrated.    Advised patient to reach out with future needs/questions/issues and keep follow up appts.  Voiced understanding  Patient has follow up with Hem/Onc in April for Polycythenia.     PCP   Cardio Dr. Chadwcik    Offered patient enrollment in the Remote Patient Monitoring (RPM) program for in-home monitoring: Yes, but did not enroll at this time: already monitoring with home equipment.     Assessments Completed:

## 2025-03-21 ENCOUNTER — OFFICE VISIT (OUTPATIENT)
Dept: FAMILY MEDICINE CLINIC | Age: 85
End: 2025-03-21
Payer: MEDICARE

## 2025-03-21 ENCOUNTER — CARE COORDINATION (OUTPATIENT)
Dept: CARE COORDINATION | Age: 85
End: 2025-03-21

## 2025-03-21 ENCOUNTER — TELEPHONE (OUTPATIENT)
Dept: FAMILY MEDICINE CLINIC | Age: 85
End: 2025-03-21

## 2025-03-21 VITALS
WEIGHT: 195 LBS | TEMPERATURE: 97.9 F | OXYGEN SATURATION: 98 % | DIASTOLIC BLOOD PRESSURE: 74 MMHG | BODY MASS INDEX: 35.88 KG/M2 | SYSTOLIC BLOOD PRESSURE: 118 MMHG | HEART RATE: 77 BPM | HEIGHT: 62 IN

## 2025-03-21 DIAGNOSIS — R39.9 UTI SYMPTOMS: ICD-10-CM

## 2025-03-21 DIAGNOSIS — R39.9 UTI SYMPTOMS: Primary | ICD-10-CM

## 2025-03-21 DIAGNOSIS — R31.21 ASYMPTOMATIC MICROSCOPIC HEMATURIA: ICD-10-CM

## 2025-03-21 LAB
BILIRUBIN, POC: NORMAL
BLOOD URINE, POC: NORMAL
CLARITY, POC: NORMAL
COLOR, POC: NORMAL
GLUCOSE URINE, POC: NORMAL MG/DL
KETONES, POC: NORMAL MG/DL
LEUKOCYTE EST, POC: NORMAL
NITRITE, POC: NORMAL
PH, POC: 5.5
PROTEIN, POC: NORMAL MG/DL
SPECIFIC GRAVITY, POC: 1.02
UROBILINOGEN, POC: NORMAL MG/DL

## 2025-03-21 PROCEDURE — 3074F SYST BP LT 130 MM HG: CPT | Performed by: NURSE PRACTITIONER

## 2025-03-21 PROCEDURE — 1159F MED LIST DOCD IN RCRD: CPT | Performed by: NURSE PRACTITIONER

## 2025-03-21 PROCEDURE — 1123F ACP DISCUSS/DSCN MKR DOCD: CPT | Performed by: NURSE PRACTITIONER

## 2025-03-21 PROCEDURE — 81003 URINALYSIS AUTO W/O SCOPE: CPT | Performed by: NURSE PRACTITIONER

## 2025-03-21 PROCEDURE — 99213 OFFICE O/P EST LOW 20 MIN: CPT | Performed by: NURSE PRACTITIONER

## 2025-03-21 PROCEDURE — 3078F DIAST BP <80 MM HG: CPT | Performed by: NURSE PRACTITIONER

## 2025-03-21 PROCEDURE — 1160F RVW MEDS BY RX/DR IN RCRD: CPT | Performed by: NURSE PRACTITIONER

## 2025-03-21 RX ORDER — CEPHALEXIN 500 MG/1
500 CAPSULE ORAL 2 TIMES DAILY
Qty: 14 CAPSULE | Refills: 0 | Status: SHIPPED | OUTPATIENT
Start: 2025-03-21 | End: 2025-03-28

## 2025-03-21 ASSESSMENT — ENCOUNTER SYMPTOMS
CHEST TIGHTNESS: 0
ABDOMINAL PAIN: 0
NAUSEA: 0
CONSTIPATION: 0
VOMITING: 0
SHORTNESS OF BREATH: 0
BACK PAIN: 0
DIARRHEA: 0

## 2025-03-21 NOTE — CARE COORDINATION
Patient called this ACM to report blood in urine starting last evening.  States pinkish urine.  Denies dysuria, urgency, flank pain, or itching.  Denies blood in stool and denies any other bleeding.    Does take Pradaxa.    Patient wants this ACM to message PCP for advice.    ACM will message PCP

## 2025-03-21 NOTE — PROGRESS NOTES
Date of Visit:  3/21/2025  Patient Name: Megan Garcia   Patient :  1940     CHIEF COMPLAINT:     Megan Garcia is a 84 y.o. female who presents today for an general visit to be evaluated for the following condition(s):  Chief Complaint   Patient presents with    Urinary Tract Infection     Onset- yesterday   Pt noticed blood in urine.   Frequency- N  Urgency- N  Small volume void- N  Dysuria- N  Pressure- Y   Back pain- N   Nocturia- N  Fever/ chills-  N  Nausea/ vomiting- N  UTI or other reason for antbx's last 30 days- none        HISTORY OF PRESENT ILLNESS     I reviewed staff HPI/chief complaint and do agree with above      Patient presents today for concerns of hematuria that has been present x 1 day.  She does have urinary frequency however this is not an uncommon finding for patient as she does use Lasix, and does endorse chronic lower back pain with no changes in frequency, characteristics or severity.  Denies any urinary urgency, small voiding volumes, dysuria or abdominal pain/discomfort.  Patient states she does have a past medical history significant for saddle PEs and atrial fibrillation and was initially started off on Eliquis and Cardizem for these conditions however developed a rash after starting these medications therefore was switched to Pradaxa.  Denies any other abnormal signs of bleeding or bruising.  Denies any fever/chills, nausea/vomiting/diarrhea symptoms.        REVIEW OF SYSTEM      Review of Systems   Constitutional:  Negative for chills, fatigue and fever.   Respiratory:  Negative for chest tightness and shortness of breath.    Cardiovascular:  Negative for chest pain and palpitations.   Gastrointestinal:  Negative for abdominal pain, constipation, diarrhea, nausea and vomiting.   Genitourinary:  Positive for hematuria. Negative for decreased urine volume, difficulty urinating, dysuria, enuresis, flank pain, frequency and urgency.        + Hematuria   Musculoskeletal:

## 2025-03-21 NOTE — TELEPHONE ENCOUNTER
Patient called to report she is having blood in urine (pinkish color).  Started last evening.  Does take Pradaxa.    Denies dysuria, urgency, frequency, flank pain, or abd pain.  Denies blood in stool  Wants your thoughts and advice  Thanks    Megan [phone]

## 2025-03-23 LAB — BACTERIA UR CULT: NORMAL

## 2025-03-24 ENCOUNTER — RESULTS FOLLOW-UP (OUTPATIENT)
Dept: FAMILY MEDICINE CLINIC | Age: 85
End: 2025-03-24

## 2025-04-02 RX ORDER — METOPROLOL SUCCINATE 100 MG/1
100 TABLET, EXTENDED RELEASE ORAL 2 TIMES DAILY
Qty: 180 TABLET | Refills: 3 | Status: SHIPPED | OUTPATIENT
Start: 2025-04-02

## 2025-04-02 RX ORDER — DABIGATRAN ETEXILATE 150 MG/1
150 CAPSULE ORAL 2 TIMES DAILY
Qty: 180 CAPSULE | Refills: 3 | Status: SHIPPED | OUTPATIENT
Start: 2025-04-02

## 2025-04-02 NOTE — TELEPHONE ENCOUNTER
Per LOV note:  10. Petechiae Rash all over body. No oral mucosa involvement. Finish steroid dose pack. DC Diltiazem and Eliquis ( Severe Rash) start Pradaxa. Advance Toprol to 100 bid from 50 bid.     Called and spoke to patient to notify her that per her last appointment, Dr. Chadwick had increased her Toprol to 100mg PO BID. Patient verbalized understanding. Refills for Pradaxa and Toprol XL sent to [facility] for patient.

## 2025-04-02 NOTE — TELEPHONE ENCOUNTER
Wants to know if she's suppose to take 1 pill twice a day.  Says Farrukh told her to do so, but the pill bottle says 1 pill daily.     Wants to confirm which is correct.

## 2025-04-08 ENCOUNTER — CARE COORDINATION (OUTPATIENT)
Dept: CARE COORDINATION | Age: 85
End: 2025-04-08

## 2025-04-08 NOTE — CARE COORDINATION
Symptoms: Other (Comment: hematuria)          Medications Reviewed:   Patient denies any changes with medications and reports taking all medications as prescribed.    Advance Care Planning:   Reviewed and current     Care Planning:    Goals Addressed                   This Visit's Progress     Conditions and Symptoms   On track     I will schedule office visits, as directed by my provider.  I will keep my appointment or reschedule if I have to cancel.  I will notify my provider of any barriers to my plan of care.  I will follow my Zone Management tool to seek urgent or emergent care.  I will notify my provider of any symptoms that indicate a worsening of my condition.    Barriers: overwhelmed by complexity of regimen and stress  Plan for overcoming my barriers: will work with ACM towards goals and ACM will provide education and resources  Confidence: 9/10  Anticipated Goal Completion Date: 6/12/25                 PCP/Specialist follow up:   Future Appointments         Provider Specialty Dept Phone    5/13/2025 2:00 PM Bing Lester, APRN - CNP Family Medicine [phone]    6/13/2025 11:30 AM Daniel Chadwick MD Cardiology [phone]            Follow Up:   Plan for next ACM outreach in approximately 3 weeks to complete:  - disease specific assessments  - goal progression  - education .   Patient  is agreeable to this plan.

## 2025-04-29 ENCOUNTER — CARE COORDINATION (OUTPATIENT)
Dept: CARE COORDINATION | Age: 85
End: 2025-04-29

## 2025-04-29 NOTE — CARE COORDINATION
Ambulatory Care Coordination Note     4/29/2025 3:17 PM     ACM outreach attempt by this ACM today to perform care management follow up . ACM was unable to reach the patient by telephone today;   left voice message requesting a return phone call to this ACM.     ACM: Yani Sargent RN     Care Summary Note: unable to reach    PCP/Specialist follow up:   Future Appointments         Provider Specialty Dept Phone    5/13/2025 2:00 PM Bing Lester, APRN - CNP Family Medicine [phone]    6/13/2025 11:30 AM Daniel Chadwick MD Cardiology [phone]            Follow Up:   Plan for next ACM outreach in approximately 1 week to complete:  - disease specific assessments  - goal progression  - education .

## 2025-05-06 ENCOUNTER — CARE COORDINATION (OUTPATIENT)
Dept: CARE COORDINATION | Age: 85
End: 2025-05-06

## 2025-05-06 NOTE — CARE COORDINATION
Ambulatory Care Coordination Note     5/6/2025 3:31 PM     ACM outreach attempt by this ACM today to perform care management follow up . ACM was unable to reach the patient by telephone today;   left voice message requesting a return phone call to this ACM.     ACM: Yani Sargent RN     Care Summary Note: unable to reach    PCP/Specialist follow up:   Future Appointments         Provider Specialty Dept Phone    5/13/2025 2:00 PM Bing Lester, APRN - CNP Family Medicine [phone]    6/13/2025 11:30 AM Daniel Chadwick MD Cardiology [phone]            Follow Up:   Plan for next ACM outreach in approximately 1 week to complete:  - disease specific assessments.

## 2025-05-13 ENCOUNTER — OFFICE VISIT (OUTPATIENT)
Dept: FAMILY MEDICINE CLINIC | Age: 85
End: 2025-05-13
Payer: MEDICARE

## 2025-05-13 ENCOUNTER — CARE COORDINATION (OUTPATIENT)
Dept: CARE COORDINATION | Age: 85
End: 2025-05-13

## 2025-05-13 VITALS
DIASTOLIC BLOOD PRESSURE: 72 MMHG | HEART RATE: 70 BPM | OXYGEN SATURATION: 95 % | BODY MASS INDEX: 35.19 KG/M2 | SYSTOLIC BLOOD PRESSURE: 114 MMHG | WEIGHT: 191.2 LBS | HEIGHT: 62 IN

## 2025-05-13 DIAGNOSIS — I48.91 ATRIAL FIBRILLATION, UNSPECIFIED TYPE (HCC): Primary | ICD-10-CM

## 2025-05-13 DIAGNOSIS — F41.1 GAD (GENERALIZED ANXIETY DISORDER): ICD-10-CM

## 2025-05-13 DIAGNOSIS — R31.21 ASYMPTOMATIC MICROSCOPIC HEMATURIA: ICD-10-CM

## 2025-05-13 PROCEDURE — 99214 OFFICE O/P EST MOD 30 MIN: CPT | Performed by: NURSE PRACTITIONER

## 2025-05-13 PROCEDURE — 1159F MED LIST DOCD IN RCRD: CPT | Performed by: NURSE PRACTITIONER

## 2025-05-13 PROCEDURE — 3074F SYST BP LT 130 MM HG: CPT | Performed by: NURSE PRACTITIONER

## 2025-05-13 PROCEDURE — 1123F ACP DISCUSS/DSCN MKR DOCD: CPT | Performed by: NURSE PRACTITIONER

## 2025-05-13 PROCEDURE — 3078F DIAST BP <80 MM HG: CPT | Performed by: NURSE PRACTITIONER

## 2025-05-13 RX ORDER — BUSPIRONE HYDROCHLORIDE 5 MG/1
5 TABLET ORAL 3 TIMES DAILY
Qty: 270 TABLET | Refills: 1 | Status: SHIPPED | OUTPATIENT
Start: 2025-05-13

## 2025-05-13 ASSESSMENT — ENCOUNTER SYMPTOMS
EYE PAIN: 0
ABDOMINAL PAIN: 0
TROUBLE SWALLOWING: 0
SHORTNESS OF BREATH: 0
CONSTIPATION: 0
COLOR CHANGE: 0
BACK PAIN: 0
DIARRHEA: 0
COUGH: 0
CHEST TIGHTNESS: 0

## 2025-05-13 NOTE — CARE COORDINATION
Ambulatory Care Coordination Note     5/13/2025 2:27 PM     ACM outreach attempt by this ACM today to perform care management follow up . ACM was unable to reach the patient by telephone today;   left voice message requesting a return phone call to this ACM.     ACM: Yani Sargent RN     Care Summary Note: unable to reach    PCP/Specialist follow up:   Future Appointments         Provider Specialty Dept Phone    6/13/2025 11:30 AM Daniel Chadwick MD Cardiology [phone]            Follow Up:   Plan for next ACM outreach in approximately 2 weeks to complete:  - disease specific assessments  - goal progression  - education .

## 2025-05-13 NOTE — PROGRESS NOTES
Subjective  Chief Complaint   Patient presents with    Atrial Fibrillation     3 mos check up, states that she has been feeling good. States no concerns       HPI    Was seen previously for urinary symptoms, had some hematuria but no bacteria was found with culture still finished the antibiotic. States no urinary symptoms and has not had any blood since that one episode.     No current complaints with anxiety. Taking Buspar bid, no complaints.     Has no side effects with the pradaxa at this time. Has f/u scheduled with Dr. Chadwick on 6/13/25.    When to hematologist, ordered genetic testing. Awaiting results.     Left hip burning when laying at night, once pressure is released burning stops, if position changes to other side her rt arm and shoulder starts causing discomfort. Denies any pain or discomfort when moving.      Past Medical History:   Diagnosis Date    Bladder prolapse, female, acquired     Chest pain 10/14/2017    Degenerative arthritis of cervical spine 06/29/2012    Diverticulosis     ALEJANDRE (dyspnea on exertion) 10/14/2017    ELIZABETH (generalized anxiety disorder)     Generalized osteoarthritis of multiple sites     knees and neck    Heart palpitations 11/13/2017    History of bone density study 09/2014    Hyperlipidemia 06/29/2012    Hypertension 06/29/2012    Hypothyroidism 06/29/2012    Internal hemorrhoid     Obesity     Osteopenia 09/2014    Seborrheic dermatitis of scalp     Sleep apnea      Patient Active Problem List    Diagnosis Date Noted    Polycythemia vera ([facility]) 01/28/2025    Saddle embolus of pulmonary artery without acute cor pulmonale ([facility]) 01/10/2025    Acute saddle pulmonary embolism with acute cor pulmonale ([facility]) 01/10/2025    Severe obesity (BMI 35.0-39.9) with comorbidity ([facility]) 05/18/2023    Pulmonary hypertension ([facility]) 02/15/2022    Chronic obstructive pulmonary disease, unspecified COPD type ([facility]) 08/03/2021    Morbidly obese ([facility]) 09/03/2020    Sleep apnea 09/03/2019    Obesity (BMI

## 2025-05-27 ENCOUNTER — CARE COORDINATION (OUTPATIENT)
Dept: CARE COORDINATION | Age: 85
End: 2025-05-27

## 2025-05-27 NOTE — CARE COORDINATION
Ambulatory Care Coordination Note     2025 11:25 AM     Patient Current Location:  Home: [address] [place] [id number]     ACM contacted the patient by telephone. Verified name and  with patient as identifiers.     Patient graduated from the High Risk Care Management program on 2025.  Patient verbalizes confidence in the ability to self-manage at this time. has the ability to self-manage at this time..  Care management goals have been completed. No further Ambulatory Care Manager follow up scheduled.      ACM: Yani Sargent RN     Challenges to be reviewed by the provider   Additional needs identified to be addressed with provider No  na               Method of communication with provider: none.    Utilization: Patient has not had any utilization since our last call.     Care Summary Note: spoke with patient for care coordination follow up for HTN, AFib, and graduation  Patient states she is doing ok for this call.  Denies any needs  Patient states she is still having chest \"sensation\" or \"palpitations\" and feels she is in AFib.  Seen by PCP on  and was advised in Afib.  Follow up with cardiology in .  Taking Pradaxa as prescribed and denies any issues with swallowing or with pain.  Patient denies CP, dizziness, or headaches  BP stable.  130/80, 114/72.  Advised to monitor BP daily and keep log.  Encouraged to reach out to cardiology with questions or needs  Patient seen by Hem/Onc and had genetic testing done.  Waiting for results.  Encouraged to keep July follow up.   Denies urinary issues.  Encouraged to drink fluids and stay hydrated.  Encouraged to report dysuria, hematuria, or other urine issues.  Voiced understanding  Patient denies falls.  Reviewed ambulation safety.  Encouraged slow position changes.  Patient uses walker or cane most of time.    Encouraged to reach out to PCP or this ACM with future needs/questions.  Voiced understanding.  ACM # provided      Offered

## 2025-06-13 ENCOUNTER — OFFICE VISIT (OUTPATIENT)
Age: 85
End: 2025-06-13
Payer: MEDICARE

## 2025-06-13 VITALS
WEIGHT: 192 LBS | HEIGHT: 62 IN | BODY MASS INDEX: 35.33 KG/M2 | TEMPERATURE: 97.2 F | DIASTOLIC BLOOD PRESSURE: 84 MMHG | SYSTOLIC BLOOD PRESSURE: 130 MMHG | HEART RATE: 75 BPM | OXYGEN SATURATION: 98 %

## 2025-06-13 DIAGNOSIS — I10 ESSENTIAL HYPERTENSION: Primary | ICD-10-CM

## 2025-06-13 DIAGNOSIS — E78.5 DYSLIPIDEMIA: ICD-10-CM

## 2025-06-13 DIAGNOSIS — I26.92 ACUTE SADDLE PULMONARY EMBOLISM WITHOUT ACUTE COR PULMONALE (HCC): ICD-10-CM

## 2025-06-13 DIAGNOSIS — G47.30 SLEEP APNEA, UNSPECIFIED TYPE: ICD-10-CM

## 2025-06-13 PROCEDURE — 1123F ACP DISCUSS/DSCN MKR DOCD: CPT | Performed by: INTERNAL MEDICINE

## 2025-06-13 PROCEDURE — 3079F DIAST BP 80-89 MM HG: CPT | Performed by: INTERNAL MEDICINE

## 2025-06-13 PROCEDURE — 1159F MED LIST DOCD IN RCRD: CPT | Performed by: INTERNAL MEDICINE

## 2025-06-13 PROCEDURE — 99214 OFFICE O/P EST MOD 30 MIN: CPT | Performed by: INTERNAL MEDICINE

## 2025-06-13 PROCEDURE — 3075F SYST BP GE 130 - 139MM HG: CPT | Performed by: INTERNAL MEDICINE

## 2025-06-13 PROCEDURE — 1126F AMNT PAIN NOTED NONE PRSNT: CPT | Performed by: INTERNAL MEDICINE

## 2025-06-13 ASSESSMENT — ENCOUNTER SYMPTOMS
GASTROINTESTINAL NEGATIVE: 1
EYES NEGATIVE: 1
COUGH: 0
BLOOD IN STOOL: 0
STRIDOR: 0
WHEEZING: 0
NAUSEA: 0
CHEST TIGHTNESS: 0

## 2025-06-13 NOTE — PROGRESS NOTES
Healthy Lifestyle, Low Salt Diet, Take Precautions to Prevent Falls and Walk Daily    Return in about 6 months (around 12/13/2025).      Electronically signed by INEZ EDMONDS MD on 6/13/2025 at 11:50 AM

## 2025-08-14 ENCOUNTER — OFFICE VISIT (OUTPATIENT)
Dept: FAMILY MEDICINE CLINIC | Age: 85
End: 2025-08-14
Payer: MEDICARE

## 2025-08-14 VITALS
BODY MASS INDEX: 35.33 KG/M2 | SYSTOLIC BLOOD PRESSURE: 110 MMHG | HEART RATE: 85 BPM | DIASTOLIC BLOOD PRESSURE: 72 MMHG | OXYGEN SATURATION: 94 % | HEIGHT: 62 IN | WEIGHT: 192 LBS

## 2025-08-14 DIAGNOSIS — Z00.00 MEDICARE ANNUAL WELLNESS VISIT, SUBSEQUENT: ICD-10-CM

## 2025-08-14 DIAGNOSIS — F41.1 GAD (GENERALIZED ANXIETY DISORDER): ICD-10-CM

## 2025-08-14 DIAGNOSIS — E78.2 MIXED HYPERLIPIDEMIA: Primary | ICD-10-CM

## 2025-08-14 DIAGNOSIS — D45 POLYCYTHEMIA VERA (HCC): ICD-10-CM

## 2025-08-14 DIAGNOSIS — M25.561 CHRONIC PAIN OF RIGHT KNEE: ICD-10-CM

## 2025-08-14 DIAGNOSIS — G89.29 CHRONIC PAIN OF RIGHT KNEE: ICD-10-CM

## 2025-08-14 DIAGNOSIS — I48.91 ATRIAL FIBRILLATION, UNSPECIFIED TYPE (HCC): ICD-10-CM

## 2025-08-14 PROCEDURE — 3074F SYST BP LT 130 MM HG: CPT | Performed by: NURSE PRACTITIONER

## 2025-08-14 PROCEDURE — 1123F ACP DISCUSS/DSCN MKR DOCD: CPT | Performed by: NURSE PRACTITIONER

## 2025-08-14 PROCEDURE — 3078F DIAST BP <80 MM HG: CPT | Performed by: NURSE PRACTITIONER

## 2025-08-14 PROCEDURE — 99213 OFFICE O/P EST LOW 20 MIN: CPT | Performed by: NURSE PRACTITIONER

## 2025-08-14 PROCEDURE — G0439 PPPS, SUBSEQ VISIT: HCPCS | Performed by: NURSE PRACTITIONER

## 2025-08-14 PROCEDURE — 1159F MED LIST DOCD IN RCRD: CPT | Performed by: NURSE PRACTITIONER

## 2025-08-14 RX ORDER — HYDROXYUREA 500 MG/1
500 CAPSULE ORAL DAILY
COMMUNITY
Start: 2025-07-17

## 2025-08-14 ASSESSMENT — PATIENT HEALTH QUESTIONNAIRE - PHQ9
3. TROUBLE FALLING OR STAYING ASLEEP: NOT AT ALL
7. TROUBLE CONCENTRATING ON THINGS, SUCH AS READING THE NEWSPAPER OR WATCHING TELEVISION: NOT AT ALL
SUM OF ALL RESPONSES TO PHQ QUESTIONS 1-9: 0
4. FEELING TIRED OR HAVING LITTLE ENERGY: NOT AT ALL
10. IF YOU CHECKED OFF ANY PROBLEMS, HOW DIFFICULT HAVE THESE PROBLEMS MADE IT FOR YOU TO DO YOUR WORK, TAKE CARE OF THINGS AT HOME, OR GET ALONG WITH OTHER PEOPLE: NOT DIFFICULT AT ALL
6. FEELING BAD ABOUT YOURSELF - OR THAT YOU ARE A FAILURE OR HAVE LET YOURSELF OR YOUR FAMILY DOWN: NOT AT ALL
9. THOUGHTS THAT YOU WOULD BE BETTER OFF DEAD, OR OF HURTING YOURSELF: NOT AT ALL
5. POOR APPETITE OR OVEREATING: NOT AT ALL
SUM OF ALL RESPONSES TO PHQ QUESTIONS 1-9: 0
1. LITTLE INTEREST OR PLEASURE IN DOING THINGS: NOT AT ALL
8. MOVING OR SPEAKING SO SLOWLY THAT OTHER PEOPLE COULD HAVE NOTICED. OR THE OPPOSITE, BEING SO FIGETY OR RESTLESS THAT YOU HAVE BEEN MOVING AROUND A LOT MORE THAN USUAL: NOT AT ALL
2. FEELING DOWN, DEPRESSED OR HOPELESS: NOT AT ALL
SUM OF ALL RESPONSES TO PHQ QUESTIONS 1-9: 0
SUM OF ALL RESPONSES TO PHQ QUESTIONS 1-9: 0

## 2025-08-14 ASSESSMENT — LIFESTYLE VARIABLES
HOW OFTEN DO YOU HAVE A DRINK CONTAINING ALCOHOL: NEVER
HOW MANY STANDARD DRINKS CONTAINING ALCOHOL DO YOU HAVE ON A TYPICAL DAY: PATIENT DOES NOT DRINK

## (undated) DEVICE — Device

## (undated) DEVICE — GUIDEWIRE: Brand: AMPLATZ SUPER STIFF™

## (undated) DEVICE — GLOVE SURG SZ 6 THK91MIL LTX FREE SYN POLYISOPRENE ANTI

## (undated) DEVICE — SPONGE GZ W4XL4IN COT 12 PLY TYP VII WVN C FLD DSGN STERILE

## (undated) DEVICE — CATHETER DIAG AD 5FR L125CM COR NYL MP AMPLATZ 2 W/ 2 SIDE

## (undated) DEVICE — SYRINGE MEDICAL 3ML CLEAR PLASTIC STANDARD NON CONTROL LUERLOCK TIP DISPOSABLE

## (undated) DEVICE — GUIDEWIRE VASC L180CM DIA0.035IN TIP L7CM PTFE S STL STR

## (undated) DEVICE — GUIDEWIRE VASC L260CM DIA0.035IN TIP L3MM STD EXCHG PTFE J

## (undated) DEVICE — FLOWSTASIS SINGLE-PACK: Brand: FLOWSTASIS SINGLE-PACK

## (undated) DEVICE — SYRINGE MED 30ML STD CLR PLAS LUERLOCK TIP N CTRL DISP

## (undated) DEVICE — KIT MED IMAG CNTRST AGNT W/ IOPAMIDOL REUSE

## (undated) DEVICE — FLOWTRIEVER THROMBECTOMY SYSTEM PRICE PER PROCEDURE

## (undated) DEVICE — KIT MFLD ISOLATN NACL CNTRST PRT TBNG SPIK W/ PRSS TRNSDUC

## (undated) DEVICE — GLOVE ORANGE PI 7 1/2   MSG9075

## (undated) DEVICE — ELECTRODE TRAIN EDGE SYS W/ QUIK-COMBO CONN

## (undated) DEVICE — CATHETER COR DIAG PIGTAILS PIG 145 CRV 5FR 110CM 6 SIDE H

## (undated) DEVICE — 4FR MICROPUNCTURE KIT STIFFEN

## (undated) DEVICE — Device: Brand: TRIEVER24

## (undated) DEVICE — INTRI24™ INTRODUCER SHEATH (SHEATH): Brand: INTRI24 INTRODUCER SHEATH

## (undated) DEVICE — GUIDEWIRE VASC L260CM DIA0.035IN L4CM DIA1.5MM J TIP PTFE S

## (undated) DEVICE — APPLICATOR MEDICATED 10.5 CC SOLUTION HI LT ORNG CHLORAPREP

## (undated) DEVICE — SUTURE PERMA-HAND SZ 0 L30IN NONABSORBABLE BLK L36MM CT-1 424H

## (undated) DEVICE — 3M™ TEGADERM™ TRANSPARENT FILM DRESSING FRAME STYLE, 1626W, 4 IN X 4-3/4 IN (10 CM X 12 CM), 50/CT 4CT/CASE: Brand: 3M™ TEGADERM™

## (undated) DEVICE — SHEET,DRAPE,53X77,STERILE: Brand: MEDLINE

## (undated) DEVICE — SYRINGE MED 10ML LUERLOCK TIP W/O SFTY DISP

## (undated) DEVICE — KIT ANGIO W/ AT P65 PREM HND CTRL FOR CNTRST DEL ANGIOTOUCH

## (undated) DEVICE — FLOWSAVER: Brand: FLOWSAVER

## (undated) DEVICE — CONTAINER,SPECIMEN,OR STERILE,4OZ: Brand: MEDLINE

## (undated) DEVICE — INTRODUCER SHTH 7FR CANN L11CM DIL TIP 35MM ORNG TUNGSTEN